# Patient Record
Sex: MALE | Race: WHITE | NOT HISPANIC OR LATINO | URBAN - METROPOLITAN AREA
[De-identification: names, ages, dates, MRNs, and addresses within clinical notes are randomized per-mention and may not be internally consistent; named-entity substitution may affect disease eponyms.]

---

## 2019-02-04 ENCOUNTER — ON CAMPUS - OUTPATIENT (AMBULATORY)
Dept: URBAN - METROPOLITAN AREA HOSPITAL 2 | Facility: HOSPITAL | Age: 74
End: 2019-02-04

## 2019-02-04 VITALS
HEART RATE: 100 BPM | DIASTOLIC BLOOD PRESSURE: 75 MMHG | OXYGEN SATURATION: 98 % | HEIGHT: 69 IN | SYSTOLIC BLOOD PRESSURE: 124 MMHG | DIASTOLIC BLOOD PRESSURE: 86 MMHG | OXYGEN SATURATION: 100 % | HEART RATE: 86 BPM | DIASTOLIC BLOOD PRESSURE: 94 MMHG | HEART RATE: 109 BPM | RESPIRATION RATE: 15 BRPM | OXYGEN SATURATION: 96 % | TEMPERATURE: 97.1 F | SYSTOLIC BLOOD PRESSURE: 101 MMHG | SYSTOLIC BLOOD PRESSURE: 188 MMHG | RESPIRATION RATE: 16 BRPM | DIASTOLIC BLOOD PRESSURE: 91 MMHG | DIASTOLIC BLOOD PRESSURE: 83 MMHG | DIASTOLIC BLOOD PRESSURE: 88 MMHG | HEART RATE: 107 BPM | RESPIRATION RATE: 18 BRPM | HEART RATE: 108 BPM | SYSTOLIC BLOOD PRESSURE: 169 MMHG | SYSTOLIC BLOOD PRESSURE: 144 MMHG | SYSTOLIC BLOOD PRESSURE: 115 MMHG | SYSTOLIC BLOOD PRESSURE: 143 MMHG | DIASTOLIC BLOOD PRESSURE: 60 MMHG | SYSTOLIC BLOOD PRESSURE: 134 MMHG | DIASTOLIC BLOOD PRESSURE: 73 MMHG | OXYGEN SATURATION: 99 % | HEART RATE: 105 BPM | SYSTOLIC BLOOD PRESSURE: 130 MMHG | WEIGHT: 265 LBS | SYSTOLIC BLOOD PRESSURE: 164 MMHG | OXYGEN SATURATION: 97 %

## 2019-02-04 DIAGNOSIS — Z12.11 ENCOUNTER FOR SCREENING FOR MALIGNANT NEOPLASM OF COLON: ICD-10-CM

## 2019-02-04 DIAGNOSIS — K64.1 SECOND DEGREE HEMORRHOIDS: ICD-10-CM

## 2019-02-04 PROCEDURE — G0121 COLON CA SCRN NOT HI RSK IND: HCPCS | Performed by: INTERNAL MEDICINE

## 2019-02-04 NOTE — SERVICEHPINOTES
RICH ALLEN  is a  73  male   who presents today for a  Colonoscopy   for   the indications listed below. The updated Patient Profile was reviewed prior to the procedure, in conjunction with the Physical Exam, including medical conditions, surgical procedures, medications, allergies, family history and social history. See Physical Exam time stamp below for date and time of HPI completion.Pre-operatively, I reviewed the indication(s) for the procedure, the risks of the procedure [including but not limited to: unexpected bleeding possibly requiring hospitalization and/or unplanned repeat procedures, perforation possibly requiring surgical treatment, missed lesions and complications of sedation/MAC (also explained by anesthesia staff)]. I have evaluated the patient for risks associated with the planned anesthesia and the procedure to be performed and find the patient an acceptable candidate for IV sedation.Multiple opportunities were provided for any questions or concerns, and all questions were answered satisfactorily before any anesthesia was administered. We will proceed with the planned procedure.BR

## 2021-05-27 ENCOUNTER — OFFICE (AMBULATORY)
Dept: URBAN - METROPOLITAN AREA CLINIC 64 | Facility: CLINIC | Age: 76
End: 2021-05-27
Payer: COMMERCIAL

## 2021-05-27 VITALS
HEART RATE: 89 BPM | HEIGHT: 69 IN | SYSTOLIC BLOOD PRESSURE: 130 MMHG | DIASTOLIC BLOOD PRESSURE: 74 MMHG | WEIGHT: 285 LBS

## 2021-05-27 DIAGNOSIS — R93.3 ABNORMAL FINDINGS ON DIAGNOSTIC IMAGING OF OTHER PARTS OF DI: ICD-10-CM

## 2021-05-27 DIAGNOSIS — R74.8 ABNORMAL LEVELS OF OTHER SERUM ENZYMES: ICD-10-CM

## 2021-05-27 DIAGNOSIS — R74.0 NONSPECIFIC ELEVATION OF LEVELS OF TRANSAMINASE AND LACTIC A: ICD-10-CM

## 2021-05-27 PROCEDURE — 99214 OFFICE O/P EST MOD 30 MIN: CPT | Performed by: NURSE PRACTITIONER

## 2021-07-22 ENCOUNTER — OFFICE (AMBULATORY)
Dept: URBAN - METROPOLITAN AREA CLINIC 64 | Facility: CLINIC | Age: 76
End: 2021-07-22
Payer: MEDICARE

## 2021-07-22 VITALS — HEIGHT: 69 IN

## 2021-07-22 DIAGNOSIS — R74.01 ELEVATION OF LEVELS OF LIVER TRANSAMINASE LEVELS: ICD-10-CM

## 2021-07-22 DIAGNOSIS — K59.00 CONSTIPATION, UNSPECIFIED: ICD-10-CM

## 2021-07-22 DIAGNOSIS — K74.69 OTHER CIRRHOSIS OF LIVER: ICD-10-CM

## 2021-07-22 DIAGNOSIS — E66.9 OBESITY, UNSPECIFIED: ICD-10-CM

## 2021-07-22 PROCEDURE — 99213 OFFICE O/P EST LOW 20 MIN: CPT | Mod: 95 | Performed by: NURSE PRACTITIONER

## 2021-09-30 ENCOUNTER — OFFICE (AMBULATORY)
Dept: URBAN - METROPOLITAN AREA CLINIC 64 | Facility: CLINIC | Age: 76
End: 2021-09-30
Payer: MEDICARE

## 2021-09-30 VITALS
HEART RATE: 94 BPM | DIASTOLIC BLOOD PRESSURE: 74 MMHG | HEIGHT: 69 IN | SYSTOLIC BLOOD PRESSURE: 132 MMHG | WEIGHT: 282 LBS

## 2021-09-30 DIAGNOSIS — K74.69 OTHER CIRRHOSIS OF LIVER: ICD-10-CM

## 2021-09-30 PROCEDURE — 99214 OFFICE O/P EST MOD 30 MIN: CPT | Performed by: NURSE PRACTITIONER

## 2022-02-22 ENCOUNTER — HOSPITAL ENCOUNTER (OUTPATIENT)
Facility: HOSPITAL | Age: 77
Setting detail: OBSERVATION
Discharge: HOME | End: 2022-02-24
Attending: EMERGENCY MEDICINE | Admitting: INTERNAL MEDICINE

## 2022-02-22 ENCOUNTER — APPOINTMENT (OUTPATIENT)
Dept: GENERAL RADIOLOGY | Facility: HOSPITAL | Age: 77
End: 2022-02-22

## 2022-02-22 ENCOUNTER — APPOINTMENT (OUTPATIENT)
Dept: CT IMAGING | Facility: HOSPITAL | Age: 77
End: 2022-02-22

## 2022-02-22 DIAGNOSIS — G45.9 TIA (TRANSIENT ISCHEMIC ATTACK): Primary | ICD-10-CM

## 2022-02-22 PROBLEM — F32.A MILD DEPRESSION: Chronic | Status: ACTIVE | Noted: 2022-02-22

## 2022-02-22 PROBLEM — G25.81 RESTLESS LEG SYNDROME: Chronic | Status: ACTIVE | Noted: 2022-02-22

## 2022-02-22 PROBLEM — I10 ESSENTIAL HYPERTENSION: Chronic | Status: ACTIVE | Noted: 2022-02-22

## 2022-02-22 PROBLEM — E11.42 DIABETIC PERIPHERAL NEUROPATHY: Chronic | Status: ACTIVE | Noted: 2022-02-22

## 2022-02-22 PROBLEM — D50.9 IRON DEFICIENCY ANEMIA: Status: ACTIVE | Noted: 2020-07-24

## 2022-02-22 PROBLEM — E11.65 TYPE 2 DIABETES MELLITUS WITH HYPERGLYCEMIA: Chronic | Status: ACTIVE | Noted: 2022-02-22

## 2022-02-22 PROBLEM — D64.9 NORMOCYTIC ANEMIA: Status: ACTIVE | Noted: 2019-04-29

## 2022-02-22 LAB
ALBUMIN SERPL-MCNC: 3.6 G/DL (ref 3.5–5.2)
ALBUMIN/GLOB SERPL: 0.9 G/DL
ALP SERPL-CCNC: 128 U/L (ref 39–117)
ALT SERPL W P-5'-P-CCNC: 27 U/L (ref 1–41)
ANION GAP SERPL CALCULATED.3IONS-SCNC: 11 MMOL/L (ref 5–15)
AST SERPL-CCNC: 63 U/L (ref 1–40)
BASOPHILS # BLD AUTO: 0.1 10*3/MM3 (ref 0–0.2)
BASOPHILS NFR BLD AUTO: 1 % (ref 0–1.5)
BILIRUB SERPL-MCNC: 0.3 MG/DL (ref 0–1.2)
BILIRUB UR QL STRIP: NEGATIVE
BUN SERPL-MCNC: 15 MG/DL (ref 8–23)
BUN/CREAT SERPL: 14.6 (ref 7–25)
CALCIUM SPEC-SCNC: 9.5 MG/DL (ref 8.6–10.5)
CHLORIDE SERPL-SCNC: 100 MMOL/L (ref 98–107)
CHOLEST SERPL-MCNC: 131 MG/DL (ref 0–200)
CLARITY UR: CLEAR
CO2 SERPL-SCNC: 28 MMOL/L (ref 22–29)
COLOR UR: YELLOW
CREAT SERPL-MCNC: 1.03 MG/DL (ref 0.76–1.27)
DEPRECATED RDW RBC AUTO: 54.3 FL (ref 37–54)
EOSINOPHIL # BLD AUTO: 0.2 10*3/MM3 (ref 0–0.4)
EOSINOPHIL NFR BLD AUTO: 3.8 % (ref 0.3–6.2)
ERYTHROCYTE [DISTWIDTH] IN BLOOD BY AUTOMATED COUNT: 15.9 % (ref 12.3–15.4)
GFR SERPL CREATININE-BSD FRML MDRD: 70 ML/MIN/1.73
GLOBULIN UR ELPH-MCNC: 4 GM/DL
GLUCOSE BLDC GLUCOMTR-MCNC: 377 MG/DL (ref 70–105)
GLUCOSE SERPL-MCNC: 250 MG/DL (ref 65–99)
GLUCOSE UR STRIP-MCNC: ABNORMAL MG/DL
HCT VFR BLD AUTO: 39.7 % (ref 37.5–51)
HDLC SERPL-MCNC: 49 MG/DL (ref 40–60)
HGB BLD-MCNC: 13.2 G/DL (ref 13–17.7)
HGB UR QL STRIP.AUTO: NEGATIVE
KETONES UR QL STRIP: NEGATIVE
LDLC SERPL CALC-MCNC: 56 MG/DL (ref 0–100)
LDLC/HDLC SERPL: 1.05 {RATIO}
LEUKOCYTE ESTERASE UR QL STRIP.AUTO: NEGATIVE
LYMPHOCYTES # BLD AUTO: 1.3 10*3/MM3 (ref 0.7–3.1)
LYMPHOCYTES NFR BLD AUTO: 23.4 % (ref 19.6–45.3)
MCH RBC QN AUTO: 32.1 PG (ref 26.6–33)
MCHC RBC AUTO-ENTMCNC: 33.2 G/DL (ref 31.5–35.7)
MCV RBC AUTO: 96.8 FL (ref 79–97)
MONOCYTES # BLD AUTO: 0.6 10*3/MM3 (ref 0.1–0.9)
MONOCYTES NFR BLD AUTO: 11.2 % (ref 5–12)
NEUTROPHILS NFR BLD AUTO: 3.3 10*3/MM3 (ref 1.7–7)
NEUTROPHILS NFR BLD AUTO: 60.6 % (ref 42.7–76)
NITRITE UR QL STRIP: NEGATIVE
NRBC BLD AUTO-RTO: 0.1 /100 WBC (ref 0–0.2)
PH UR STRIP.AUTO: <=5 [PH] (ref 5–8)
PLATELET # BLD AUTO: 140 10*3/MM3 (ref 140–450)
PMV BLD AUTO: 8.9 FL (ref 6–12)
POTASSIUM SERPL-SCNC: 4.1 MMOL/L (ref 3.5–5.2)
PROT SERPL-MCNC: 7.6 G/DL (ref 6–8.5)
PROT UR QL STRIP: NEGATIVE
RBC # BLD AUTO: 4.1 10*6/MM3 (ref 4.14–5.8)
SODIUM SERPL-SCNC: 139 MMOL/L (ref 136–145)
SP GR UR STRIP: 1.03 (ref 1–1.03)
TRIGL SERPL-MCNC: 153 MG/DL (ref 0–150)
TROPONIN T SERPL-MCNC: <0.01 NG/ML (ref 0–0.03)
TSH SERPL DL<=0.05 MIU/L-ACNC: 3.99 UIU/ML (ref 0.27–4.2)
UROBILINOGEN UR QL STRIP: ABNORMAL
VLDLC SERPL-MCNC: 26 MG/DL (ref 5–40)
WBC NRBC COR # BLD: 5.4 10*3/MM3 (ref 3.4–10.8)

## 2022-02-22 PROCEDURE — 80053 COMPREHEN METABOLIC PANEL: CPT | Performed by: EMERGENCY MEDICINE

## 2022-02-22 PROCEDURE — 81003 URINALYSIS AUTO W/O SCOPE: CPT | Performed by: EMERGENCY MEDICINE

## 2022-02-22 PROCEDURE — 82962 GLUCOSE BLOOD TEST: CPT

## 2022-02-22 PROCEDURE — 84484 ASSAY OF TROPONIN QUANT: CPT | Performed by: EMERGENCY MEDICINE

## 2022-02-22 PROCEDURE — G0378 HOSPITAL OBSERVATION PER HR: HCPCS

## 2022-02-22 PROCEDURE — 99285 EMERGENCY DEPT VISIT HI MDM: CPT

## 2022-02-22 PROCEDURE — 71045 X-RAY EXAM CHEST 1 VIEW: CPT

## 2022-02-22 PROCEDURE — 83036 HEMOGLOBIN GLYCOSYLATED A1C: CPT | Performed by: NURSE PRACTITIONER

## 2022-02-22 PROCEDURE — 93005 ELECTROCARDIOGRAM TRACING: CPT | Performed by: EMERGENCY MEDICINE

## 2022-02-22 PROCEDURE — 70450 CT HEAD/BRAIN W/O DYE: CPT

## 2022-02-22 PROCEDURE — 84443 ASSAY THYROID STIM HORMONE: CPT | Performed by: NURSE PRACTITIONER

## 2022-02-22 PROCEDURE — 85025 COMPLETE CBC W/AUTO DIFF WBC: CPT | Performed by: EMERGENCY MEDICINE

## 2022-02-22 PROCEDURE — 99219 PR INITIAL OBSERVATION CARE/DAY 50 MINUTES: CPT | Performed by: NURSE PRACTITIONER

## 2022-02-22 PROCEDURE — 80061 LIPID PANEL: CPT | Performed by: NURSE PRACTITIONER

## 2022-02-22 RX ORDER — ASPIRIN 325 MG
325 TABLET ORAL DAILY
Status: DISCONTINUED | OUTPATIENT
Start: 2022-02-23 | End: 2022-02-23

## 2022-02-22 RX ORDER — ASPIRIN 300 MG/1
300 SUPPOSITORY RECTAL DAILY
Status: DISCONTINUED | OUTPATIENT
Start: 2022-02-23 | End: 2022-02-23

## 2022-02-22 RX ORDER — DEXTROSE MONOHYDRATE 25 G/50ML
25 INJECTION, SOLUTION INTRAVENOUS
Status: DISCONTINUED | OUTPATIENT
Start: 2022-02-22 | End: 2022-02-24 | Stop reason: HOSPADM

## 2022-02-22 RX ORDER — OLANZAPINE 10 MG/2ML
1 INJECTION, POWDER, LYOPHILIZED, FOR SOLUTION INTRAMUSCULAR AS NEEDED
Status: DISCONTINUED | OUTPATIENT
Start: 2022-02-22 | End: 2022-02-24 | Stop reason: HOSPADM

## 2022-02-22 RX ORDER — ASPIRIN 325 MG
325 TABLET ORAL ONCE
Status: DISCONTINUED | OUTPATIENT
Start: 2022-02-22 | End: 2022-02-23

## 2022-02-22 RX ORDER — SODIUM CHLORIDE 0.9 % (FLUSH) 0.9 %
10 SYRINGE (ML) INJECTION AS NEEDED
Status: DISCONTINUED | OUTPATIENT
Start: 2022-02-22 | End: 2022-02-24 | Stop reason: HOSPADM

## 2022-02-22 RX ORDER — INSULIN LISPRO 100 [IU]/ML
0-9 INJECTION, SOLUTION INTRAVENOUS; SUBCUTANEOUS AS NEEDED
Status: DISCONTINUED | OUTPATIENT
Start: 2022-02-22 | End: 2022-02-24 | Stop reason: HOSPADM

## 2022-02-22 RX ORDER — INSULIN LISPRO 100 [IU]/ML
0-9 INJECTION, SOLUTION INTRAVENOUS; SUBCUTANEOUS
Status: DISCONTINUED | OUTPATIENT
Start: 2022-02-23 | End: 2022-02-24 | Stop reason: HOSPADM

## 2022-02-22 RX ORDER — ATORVASTATIN CALCIUM 40 MG/1
80 TABLET, FILM COATED ORAL NIGHTLY
Status: DISCONTINUED | OUTPATIENT
Start: 2022-02-22 | End: 2022-02-23

## 2022-02-22 RX ORDER — NICOTINE POLACRILEX 4 MG
15 LOZENGE BUCCAL
Status: DISCONTINUED | OUTPATIENT
Start: 2022-02-22 | End: 2022-02-24 | Stop reason: HOSPADM

## 2022-02-23 ENCOUNTER — APPOINTMENT (OUTPATIENT)
Dept: MRI IMAGING | Facility: HOSPITAL | Age: 77
End: 2022-02-23

## 2022-02-23 ENCOUNTER — APPOINTMENT (OUTPATIENT)
Dept: CARDIOLOGY | Facility: HOSPITAL | Age: 77
End: 2022-02-23

## 2022-02-23 PROBLEM — I73.9 PERIPHERAL VASCULAR DISEASE: Chronic | Status: ACTIVE | Noted: 2022-02-23

## 2022-02-23 LAB
ALBUMIN SERPL-MCNC: 3.5 G/DL (ref 3.5–5.2)
ALBUMIN/GLOB SERPL: 1 G/DL
ALP SERPL-CCNC: 107 U/L (ref 39–117)
ALT SERPL W P-5'-P-CCNC: 27 U/L (ref 1–41)
ANION GAP SERPL CALCULATED.3IONS-SCNC: 11 MMOL/L (ref 5–15)
AST SERPL-CCNC: 61 U/L (ref 1–40)
BILIRUB SERPL-MCNC: 0.4 MG/DL (ref 0–1.2)
BUN SERPL-MCNC: 14 MG/DL (ref 8–23)
BUN/CREAT SERPL: 15.4 (ref 7–25)
CALCIUM SPEC-SCNC: 9.2 MG/DL (ref 8.6–10.5)
CHLORIDE SERPL-SCNC: 100 MMOL/L (ref 98–107)
CO2 SERPL-SCNC: 28 MMOL/L (ref 22–29)
CREAT SERPL-MCNC: 0.91 MG/DL (ref 0.76–1.27)
DEPRECATED RDW RBC AUTO: 52.9 FL (ref 37–54)
ERYTHROCYTE [DISTWIDTH] IN BLOOD BY AUTOMATED COUNT: 15.8 % (ref 12.3–15.4)
GFR SERPL CREATININE-BSD FRML MDRD: 81 ML/MIN/1.73
GLOBULIN UR ELPH-MCNC: 3.6 GM/DL
GLUCOSE BLDC GLUCOMTR-MCNC: 156 MG/DL (ref 70–105)
GLUCOSE BLDC GLUCOMTR-MCNC: 229 MG/DL (ref 70–105)
GLUCOSE BLDC GLUCOMTR-MCNC: 257 MG/DL (ref 70–105)
GLUCOSE BLDC GLUCOMTR-MCNC: 284 MG/DL (ref 70–105)
GLUCOSE SERPL-MCNC: 139 MG/DL (ref 65–99)
HBA1C MFR BLD: 9.7 % (ref 3.5–5.6)
HCT VFR BLD AUTO: 37.1 % (ref 37.5–51)
HGB BLD-MCNC: 12.7 G/DL (ref 13–17.7)
MCH RBC QN AUTO: 32.9 PG (ref 26.6–33)
MCHC RBC AUTO-ENTMCNC: 34.1 G/DL (ref 31.5–35.7)
MCV RBC AUTO: 96.5 FL (ref 79–97)
PLATELET # BLD AUTO: 129 10*3/MM3 (ref 140–450)
PMV BLD AUTO: 8.8 FL (ref 6–12)
POTASSIUM SERPL-SCNC: 3.8 MMOL/L (ref 3.5–5.2)
PROT SERPL-MCNC: 7.1 G/DL (ref 6–8.5)
RBC # BLD AUTO: 3.85 10*6/MM3 (ref 4.14–5.8)
SODIUM SERPL-SCNC: 139 MMOL/L (ref 136–145)
VIT B12 BLD-MCNC: 366 PG/ML (ref 211–946)
WBC NRBC COR # BLD: 6 10*3/MM3 (ref 3.4–10.8)

## 2022-02-23 PROCEDURE — 80053 COMPREHEN METABOLIC PANEL: CPT | Performed by: NURSE PRACTITIONER

## 2022-02-23 PROCEDURE — 99214 OFFICE O/P EST MOD 30 MIN: CPT | Performed by: NURSE PRACTITIONER

## 2022-02-23 PROCEDURE — 97162 PT EVAL MOD COMPLEX 30 MIN: CPT

## 2022-02-23 PROCEDURE — G0378 HOSPITAL OBSERVATION PER HR: HCPCS

## 2022-02-23 PROCEDURE — 92610 EVALUATE SWALLOWING FUNCTION: CPT

## 2022-02-23 PROCEDURE — 96372 THER/PROPH/DIAG INJ SC/IM: CPT

## 2022-02-23 PROCEDURE — 85027 COMPLETE CBC AUTOMATED: CPT | Performed by: NURSE PRACTITIONER

## 2022-02-23 PROCEDURE — 82607 VITAMIN B-12: CPT | Performed by: NURSE PRACTITIONER

## 2022-02-23 PROCEDURE — 70544 MR ANGIOGRAPHY HEAD W/O DYE: CPT

## 2022-02-23 PROCEDURE — 25010000002 ENOXAPARIN PER 10 MG: Performed by: INTERNAL MEDICINE

## 2022-02-23 PROCEDURE — 63710000001 INSULIN LISPRO (HUMAN) PER 5 UNITS: Performed by: NURSE PRACTITIONER

## 2022-02-23 PROCEDURE — 25010000002 ENOXAPARIN PER 10 MG: Performed by: NURSE PRACTITIONER

## 2022-02-23 PROCEDURE — 99226 PR SBSQ OBSERVATION CARE/DAY 35 MINUTES: CPT | Performed by: INTERNAL MEDICINE

## 2022-02-23 PROCEDURE — 82962 GLUCOSE BLOOD TEST: CPT

## 2022-02-23 PROCEDURE — 63710000001 INSULIN LISPRO (HUMAN) PER 5 UNITS: Performed by: INTERNAL MEDICINE

## 2022-02-23 PROCEDURE — 70551 MRI BRAIN STEM W/O DYE: CPT

## 2022-02-23 PROCEDURE — 36415 COLL VENOUS BLD VENIPUNCTURE: CPT | Performed by: NURSE PRACTITIONER

## 2022-02-23 RX ORDER — DULOXETIN HYDROCHLORIDE 30 MG/1
60 CAPSULE, DELAYED RELEASE ORAL DAILY
Status: DISCONTINUED | OUTPATIENT
Start: 2022-02-24 | End: 2022-02-24 | Stop reason: HOSPADM

## 2022-02-23 RX ORDER — TRAZODONE HYDROCHLORIDE 50 MG/1
50 TABLET ORAL DAILY
Status: ON HOLD | COMMUNITY
End: 2022-02-23

## 2022-02-23 RX ORDER — ONDANSETRON 4 MG/1
TABLET, FILM COATED ORAL
COMMUNITY

## 2022-02-23 RX ORDER — CLOBETASOL PROPIONATE 0.5 MG/G
CREAM TOPICAL
Status: ON HOLD | COMMUNITY
End: 2022-02-23

## 2022-02-23 RX ORDER — METOPROLOL SUCCINATE 25 MG/1
25 TABLET, EXTENDED RELEASE ORAL DAILY
COMMUNITY

## 2022-02-23 RX ORDER — DULOXETIN HYDROCHLORIDE 60 MG/1
60 CAPSULE, DELAYED RELEASE ORAL DAILY
COMMUNITY

## 2022-02-23 RX ORDER — GLIMEPIRIDE 4 MG/1
4 TABLET ORAL DAILY
COMMUNITY

## 2022-02-23 RX ORDER — TRAZODONE HYDROCHLORIDE 50 MG/1
50 TABLET ORAL DAILY
Status: DISCONTINUED | OUTPATIENT
Start: 2022-02-24 | End: 2022-02-24

## 2022-02-23 RX ORDER — ATORVASTATIN CALCIUM 40 MG/1
40 TABLET, FILM COATED ORAL NIGHTLY
Status: DISCONTINUED | OUTPATIENT
Start: 2022-02-23 | End: 2022-02-24 | Stop reason: HOSPADM

## 2022-02-23 RX ORDER — ASPIRIN 81 MG/1
81 TABLET, CHEWABLE ORAL DAILY
Status: DISCONTINUED | OUTPATIENT
Start: 2022-02-24 | End: 2022-02-24 | Stop reason: HOSPADM

## 2022-02-23 RX ORDER — PIOGLITAZONEHYDROCHLORIDE 15 MG/1
15 TABLET ORAL DAILY
COMMUNITY

## 2022-02-23 RX ORDER — GABAPENTIN 600 MG/1
600 TABLET ORAL 3 TIMES DAILY
COMMUNITY

## 2022-02-23 RX ORDER — GABAPENTIN 400 MG/1
400 CAPSULE ORAL 3 TIMES DAILY
Status: ON HOLD | COMMUNITY
End: 2022-02-23

## 2022-02-23 RX ORDER — ROPINIROLE 1 MG/1
4 TABLET, FILM COATED ORAL NIGHTLY
Status: DISCONTINUED | OUTPATIENT
Start: 2022-02-24 | End: 2022-02-24 | Stop reason: HOSPADM

## 2022-02-23 RX ORDER — ROPINIROLE 4 MG/1
4 TABLET, FILM COATED ORAL DAILY
COMMUNITY

## 2022-02-23 RX ORDER — DULOXETIN HYDROCHLORIDE 30 MG/1
60 CAPSULE, DELAYED RELEASE ORAL DAILY
Status: ON HOLD | COMMUNITY
End: 2022-02-23

## 2022-02-23 RX ORDER — GABAPENTIN 600 MG/1
600 TABLET ORAL 3 TIMES DAILY
Status: DISCONTINUED | OUTPATIENT
Start: 2022-02-24 | End: 2022-02-24 | Stop reason: HOSPADM

## 2022-02-23 RX ORDER — BLOOD SUGAR DIAGNOSTIC
1 STRIP MISCELLANEOUS DAILY
Qty: 100 EACH | Refills: 2 | Status: CANCELLED | OUTPATIENT
Start: 2022-02-23

## 2022-02-23 RX ORDER — GABAPENTIN 400 MG/1
400 CAPSULE ORAL 3 TIMES DAILY
Status: DISCONTINUED | OUTPATIENT
Start: 2022-02-23 | End: 2022-02-23

## 2022-02-23 RX ORDER — METOPROLOL SUCCINATE 25 MG/1
25 TABLET, EXTENDED RELEASE ORAL DAILY
Status: DISCONTINUED | OUTPATIENT
Start: 2022-02-24 | End: 2022-02-24 | Stop reason: HOSPADM

## 2022-02-23 RX ORDER — DULOXETIN HYDROCHLORIDE 30 MG/1
30 CAPSULE, DELAYED RELEASE ORAL DAILY
Status: DISCONTINUED | OUTPATIENT
Start: 2022-02-24 | End: 2022-02-23

## 2022-02-23 RX ADMIN — ASPIRIN 300 MG: 300 SUPPOSITORY RECTAL at 10:05

## 2022-02-23 RX ADMIN — INSULIN LISPRO 2 UNITS: 100 INJECTION, SOLUTION INTRAVENOUS; SUBCUTANEOUS at 18:49

## 2022-02-23 RX ADMIN — ATORVASTATIN CALCIUM 40 MG: 40 TABLET, FILM COATED ORAL at 21:06

## 2022-02-23 RX ADMIN — ENOXAPARIN SODIUM 40 MG: 40 INJECTION SUBCUTANEOUS at 00:44

## 2022-02-23 RX ADMIN — INSULIN LISPRO 2 UNITS: 100 INJECTION, SOLUTION INTRAVENOUS; SUBCUTANEOUS at 10:05

## 2022-02-23 RX ADMIN — GABAPENTIN 400 MG: 400 CAPSULE ORAL at 21:46

## 2022-02-23 RX ADMIN — INSULIN LISPRO 6 UNITS: 100 INJECTION, SOLUTION INTRAVENOUS; SUBCUTANEOUS at 13:50

## 2022-02-23 RX ADMIN — ENOXAPARIN SODIUM 40 MG: 40 INJECTION SUBCUTANEOUS at 18:49

## 2022-02-23 RX ADMIN — Medication 10 ML: at 10:05

## 2022-02-24 ENCOUNTER — APPOINTMENT (OUTPATIENT)
Dept: CARDIOLOGY | Facility: HOSPITAL | Age: 77
End: 2022-02-24

## 2022-02-24 VITALS
HEIGHT: 69 IN | HEART RATE: 96 BPM | TEMPERATURE: 98.5 F | DIASTOLIC BLOOD PRESSURE: 56 MMHG | OXYGEN SATURATION: 92 % | SYSTOLIC BLOOD PRESSURE: 122 MMHG | BODY MASS INDEX: 42.36 KG/M2 | RESPIRATION RATE: 16 BRPM | WEIGHT: 286 LBS

## 2022-02-24 PROBLEM — R41.0 TRANSIENT CONFUSION: Status: RESOLVED | Noted: 2022-02-22 | Resolved: 2022-02-24

## 2022-02-24 PROBLEM — R41.0 TRANSIENT CONFUSION: Status: ACTIVE | Noted: 2022-02-22

## 2022-02-24 LAB
BH CV ECHO MEAS - AO MAX PG: 18.8 MMHG
BH CV ECHO MEAS - AO MEAN PG: 9.3 MMHG
BH CV ECHO MEAS - AO ROOT DIAM: 3 CM
BH CV ECHO MEAS - AO V2 MAX: 217 CM/SEC
BH CV ECHO MEAS - AO V2 VTI: 37.4 CM
BH CV ECHO MEAS - AVA(I,D): 2.18 CM2
BH CV ECHO MEAS - EDV(CUBED): 75.7 ML
BH CV ECHO MEAS - EDV(MOD-SP4): 85.5 ML
BH CV ECHO MEAS - EF(MOD-BP): 60 %
BH CV ECHO MEAS - EF(MOD-SP4): 73.9 %
BH CV ECHO MEAS - ESV(CUBED): 20.4 ML
BH CV ECHO MEAS - ESV(MOD-SP4): 22.3 ML
BH CV ECHO MEAS - FS: 35.4 %
BH CV ECHO MEAS - IVS/LVPW: 1.28 CM
BH CV ECHO MEAS - IVSD: 1.61 CM
BH CV ECHO MEAS - LV DIASTOLIC VOL/BSA (35-75): 35.6 CM2
BH CV ECHO MEAS - LV MASS(C)D: 234.6 GRAMS
BH CV ECHO MEAS - LV MAX PG: 4 MMHG
BH CV ECHO MEAS - LV MEAN PG: 2.48 MMHG
BH CV ECHO MEAS - LV SYSTOLIC VOL/BSA (12-30): 9.3 CM2
BH CV ECHO MEAS - LV V1 MAX: 100 CM/SEC
BH CV ECHO MEAS - LV V1 VTI: 22.1 CM
BH CV ECHO MEAS - LVIDD: 4.2 CM
BH CV ECHO MEAS - LVIDS: 2.7 CM
BH CV ECHO MEAS - LVOT AREA: 3.7 CM2
BH CV ECHO MEAS - LVOT DIAM: 2.17 CM
BH CV ECHO MEAS - LVPWD: 1.25 CM
BH CV ECHO MEAS - MV A MAX VEL: 128.3 CM/SEC
BH CV ECHO MEAS - MV DEC SLOPE: 449.3 CM/SEC2
BH CV ECHO MEAS - MV DEC TIME: 0.22 MSEC
BH CV ECHO MEAS - MV E MAX VEL: 98.2 CM/SEC
BH CV ECHO MEAS - MV E/A: 0.77
BH CV ECHO MEAS - MV MAX PG: 9.6 MMHG
BH CV ECHO MEAS - MV MEAN PG: 4.9 MMHG
BH CV ECHO MEAS - MV V2 VTI: 25.4 CM
BH CV ECHO MEAS - MVA(VTI): 3.2 CM2
BH CV ECHO MEAS - PA ACC TIME: 0.02 SEC
BH CV ECHO MEAS - PA PR(ACCEL): 68.7 MMHG
BH CV ECHO MEAS - PA V2 MAX: 122 CM/SEC
BH CV ECHO MEAS - RV MAX PG: 1.64 MMHG
BH CV ECHO MEAS - RV V1 MAX: 64.1 CM/SEC
BH CV ECHO MEAS - RV V1 VTI: 12.6 CM
BH CV ECHO MEAS - RVDD: 2.6 CM
BH CV ECHO MEAS - SI(MOD-SP4): 26.3 ML/M2
BH CV ECHO MEAS - SV(LVOT): 81.6 ML
BH CV ECHO MEAS - SV(MOD-SP4): 63.2 ML
BH CV XLRA MEAS LEFT DIST CCA EDV: -11 CM/SEC
BH CV XLRA MEAS LEFT DIST CCA PSV: -69.7 CM/SEC
BH CV XLRA MEAS LEFT DIST ICA EDV: 17.5 CM/SEC
BH CV XLRA MEAS LEFT DIST ICA PSV: 84.1 CM/SEC
BH CV XLRA MEAS LEFT ICA/CCA RATIO: -1.21
BH CV XLRA MEAS LEFT PROX CCA EDV: -9.9 CM/SEC
BH CV XLRA MEAS LEFT PROX CCA PSV: -68.6 CM/SEC
BH CV XLRA MEAS LEFT PROX ECA PSV: -75.8 CM/SEC
BH CV XLRA MEAS LEFT PROX ICA EDV: -16.1 CM/SEC
BH CV XLRA MEAS LEFT PROX ICA PSV: -82.7 CM/SEC
BH CV XLRA MEAS LEFT PROX SCLA PSV: 90.4 CM/SEC
BH CV XLRA MEAS LEFT VERTEBRAL A PSV: 44.3 CM/SEC
BH CV XLRA MEAS RIGHT DIST CCA EDV: -7.9 CM/SEC
BH CV XLRA MEAS RIGHT DIST CCA PSV: -61.9 CM/SEC
BH CV XLRA MEAS RIGHT ICA/CCA RATIO: 0.57
BH CV XLRA MEAS RIGHT PROX CCA EDV: -9.3 CM/SEC
BH CV XLRA MEAS RIGHT PROX CCA PSV: -75.2 CM/SEC
BH CV XLRA MEAS RIGHT PROX ECA PSV: -95.1 CM/SEC
BH CV XLRA MEAS RIGHT PROX ICA EDV: -6.7 CM/SEC
BH CV XLRA MEAS RIGHT PROX ICA PSV: -43.1 CM/SEC
BH CV XLRA MEAS RIGHT PROX SCLA PSV: 109 CM/SEC
GLUCOSE BLDC GLUCOMTR-MCNC: 217 MG/DL (ref 70–105)
GLUCOSE BLDC GLUCOMTR-MCNC: 219 MG/DL (ref 70–105)
GLUCOSE BLDC GLUCOMTR-MCNC: 266 MG/DL (ref 70–105)
GLUCOSE BLDC GLUCOMTR-MCNC: 368 MG/DL (ref 70–105)
MAXIMAL PREDICTED HEART RATE: 144 BPM
MAXIMAL PREDICTED HEART RATE: 144 BPM
STRESS TARGET HR: 122 BPM
STRESS TARGET HR: 122 BPM

## 2022-02-24 PROCEDURE — G0378 HOSPITAL OBSERVATION PER HR: HCPCS

## 2022-02-24 PROCEDURE — 63710000001 INSULIN LISPRO (HUMAN) PER 5 UNITS: Performed by: INTERNAL MEDICINE

## 2022-02-24 PROCEDURE — 96375 TX/PRO/DX INJ NEW DRUG ADDON: CPT

## 2022-02-24 PROCEDURE — 82962 GLUCOSE BLOOD TEST: CPT

## 2022-02-24 PROCEDURE — 25010000002 SULFUR HEXAFLUORIDE MICROSPH 60.7-25 MG RECONSTITUTED SUSPENSION: Performed by: INTERNAL MEDICINE

## 2022-02-24 PROCEDURE — 93880 EXTRACRANIAL BILAT STUDY: CPT

## 2022-02-24 PROCEDURE — 63710000001 INSULIN GLARGINE PER 5 UNITS: Performed by: INTERNAL MEDICINE

## 2022-02-24 PROCEDURE — 25010000002 ENOXAPARIN PER 10 MG: Performed by: INTERNAL MEDICINE

## 2022-02-24 PROCEDURE — G0108 DIAB MANAGE TRN  PER INDIV: HCPCS

## 2022-02-24 PROCEDURE — 93306 TTE W/DOPPLER COMPLETE: CPT | Performed by: INTERNAL MEDICINE

## 2022-02-24 PROCEDURE — 99217 PR OBSERVATION CARE DISCHARGE MANAGEMENT: CPT | Performed by: INTERNAL MEDICINE

## 2022-02-24 PROCEDURE — 96372 THER/PROPH/DIAG INJ SC/IM: CPT

## 2022-02-24 PROCEDURE — 93306 TTE W/DOPPLER COMPLETE: CPT

## 2022-02-24 PROCEDURE — 99213 OFFICE O/P EST LOW 20 MIN: CPT | Performed by: NURSE PRACTITIONER

## 2022-02-24 RX ORDER — ATORVASTATIN CALCIUM 20 MG/1
20 TABLET, FILM COATED ORAL NIGHTLY
Qty: 30 TABLET | Refills: 0 | Status: SHIPPED | OUTPATIENT
Start: 2022-02-24 | End: 2022-03-26

## 2022-02-24 RX ORDER — LABETALOL HYDROCHLORIDE 5 MG/ML
20 INJECTION, SOLUTION INTRAVENOUS EVERY 6 HOURS PRN
Status: DISCONTINUED | OUTPATIENT
Start: 2022-02-24 | End: 2022-02-24 | Stop reason: HOSPADM

## 2022-02-24 RX ORDER — BLOOD SUGAR DIAGNOSTIC
1 STRIP MISCELLANEOUS
Qty: 100 EACH | Refills: 2 | Status: SHIPPED | OUTPATIENT
Start: 2022-02-24

## 2022-02-24 RX ORDER — LANCETS
1 EACH MISCELLANEOUS DAILY
Qty: 100 EACH | Refills: 2 | Status: SHIPPED | OUTPATIENT
Start: 2022-02-24

## 2022-02-24 RX ORDER — ASPIRIN 81 MG/1
81 TABLET, CHEWABLE ORAL DAILY
Qty: 30 TABLET | Refills: 0 | Status: SHIPPED | OUTPATIENT
Start: 2022-02-25 | End: 2022-03-27

## 2022-02-24 RX ORDER — LANCETS
EACH MISCELLANEOUS
Qty: 100 EACH | Refills: 12 | Status: CANCELLED | OUTPATIENT
Start: 2022-02-24

## 2022-02-24 RX ORDER — ISOPROPYL ALCOHOL 700 MG/ML
1 CLOTH TOPICAL DAILY
Qty: 100 EACH | Refills: 2 | Status: SHIPPED | OUTPATIENT
Start: 2022-02-24

## 2022-02-24 RX ORDER — PEN NEEDLE, DIABETIC 30 GX3/16"
1 NEEDLE, DISPOSABLE MISCELLANEOUS DAILY
Qty: 100 EACH | Refills: 2 | Status: SHIPPED | OUTPATIENT
Start: 2022-02-24

## 2022-02-24 RX ORDER — LANOLIN ALCOHOL/MO/W.PET/CERES
1000 CREAM (GRAM) TOPICAL DAILY
Status: DISCONTINUED | OUTPATIENT
Start: 2022-02-24 | End: 2022-02-24 | Stop reason: HOSPADM

## 2022-02-24 RX ADMIN — LABETALOL 20 MG/4 ML (5 MG/ML) INTRAVENOUS SYRINGE 20 MG: at 03:44

## 2022-02-24 RX ADMIN — DULOXETINE HYDROCHLORIDE 60 MG: 30 CAPSULE, DELAYED RELEASE ORAL at 09:08

## 2022-02-24 RX ADMIN — GABAPENTIN 600 MG: 600 TABLET, FILM COATED ORAL at 09:08

## 2022-02-24 RX ADMIN — INSULIN LISPRO 4 UNITS: 100 INJECTION, SOLUTION INTRAVENOUS; SUBCUTANEOUS at 09:08

## 2022-02-24 RX ADMIN — INSULIN GLARGINE 10 UNITS: 100 INJECTION, SOLUTION SUBCUTANEOUS at 20:04

## 2022-02-24 RX ADMIN — SULFUR HEXAFLUORIDE 5 ML: KIT at 17:28

## 2022-02-24 RX ADMIN — GABAPENTIN 600 MG: 600 TABLET, FILM COATED ORAL at 17:44

## 2022-02-24 RX ADMIN — ROPINIROLE HYDROCHLORIDE 4 MG: 1 TABLET, FILM COATED ORAL at 20:03

## 2022-02-24 RX ADMIN — INSULIN LISPRO 8 UNITS: 100 INJECTION, SOLUTION INTRAVENOUS; SUBCUTANEOUS at 17:43

## 2022-02-24 RX ADMIN — CYANOCOBALAMIN TAB 1000 MCG 1000 MCG: 1000 TAB at 17:44

## 2022-02-24 RX ADMIN — ENOXAPARIN SODIUM 40 MG: 40 INJECTION SUBCUTANEOUS at 12:30

## 2022-02-24 RX ADMIN — ATORVASTATIN CALCIUM 40 MG: 40 TABLET, FILM COATED ORAL at 20:04

## 2022-02-24 RX ADMIN — ASPIRIN 81 MG CHEWABLE TABLET 81 MG: 81 TABLET CHEWABLE at 09:08

## 2022-02-24 RX ADMIN — GABAPENTIN 600 MG: 600 TABLET, FILM COATED ORAL at 20:03

## 2022-02-24 RX ADMIN — INSULIN LISPRO 4 UNITS: 100 INJECTION, SOLUTION INTRAVENOUS; SUBCUTANEOUS at 12:12

## 2022-02-24 RX ADMIN — METOPROLOL SUCCINATE 25 MG: 25 TABLET, EXTENDED RELEASE ORAL at 09:08

## 2022-02-25 ENCOUNTER — READMISSION MANAGEMENT (OUTPATIENT)
Dept: CALL CENTER | Facility: HOSPITAL | Age: 77
End: 2022-02-25

## 2022-02-26 ENCOUNTER — HOSPITAL ENCOUNTER (EMERGENCY)
Facility: HOSPITAL | Age: 77
Discharge: HOME | End: 2022-02-27
Attending: EMERGENCY MEDICINE | Admitting: EMERGENCY MEDICINE

## 2022-02-26 VITALS
BODY MASS INDEX: 41.27 KG/M2 | OXYGEN SATURATION: 96 % | HEART RATE: 88 BPM | TEMPERATURE: 97.4 F | HEIGHT: 69 IN | SYSTOLIC BLOOD PRESSURE: 144 MMHG | WEIGHT: 278.66 LBS | DIASTOLIC BLOOD PRESSURE: 71 MMHG | RESPIRATION RATE: 16 BRPM

## 2022-02-26 DIAGNOSIS — E11.65 TYPE 2 DIABETES MELLITUS WITH HYPERGLYCEMIA, WITHOUT LONG-TERM CURRENT USE OF INSULIN: Primary | ICD-10-CM

## 2022-02-26 LAB
GLUCOSE BLDC GLUCOMTR-MCNC: 193 MG/DL (ref 70–105)
QT INTERVAL: 351 MS

## 2022-02-26 PROCEDURE — 82962 GLUCOSE BLOOD TEST: CPT

## 2022-02-26 PROCEDURE — 99283 EMERGENCY DEPT VISIT LOW MDM: CPT

## 2022-02-27 PROCEDURE — 63710000001 INSULIN GLARGINE PER 5 UNITS: Performed by: EMERGENCY MEDICINE

## 2022-02-27 RX ADMIN — INSULIN GLARGINE 10 UNITS: 100 INJECTION, SOLUTION SUBCUTANEOUS at 00:33

## 2022-03-01 ENCOUNTER — READMISSION MANAGEMENT (OUTPATIENT)
Dept: CALL CENTER | Facility: HOSPITAL | Age: 77
End: 2022-03-01

## 2022-03-01 NOTE — OUTREACH NOTE
Stroke Week 1 Survey      Responses   [facility] patient discharged from? [facility]   Does the patient have one of the following disease processes/diagnoses(primary or secondary)? Stroke (TIA)   Week 1 attempt successful? Yes   Call start time 1155   Call end time 1200   Discharge diagnosis TIA   Meds reviewed with patient/caregiver? Yes   Is the patient having any side effects they believe may be caused by any medication additions or changes? No   Does the patient have all medications ordered at discharge? Yes   Is the patient taking all medications as directed (includes completed medication regime)? No   What is preventing the patient from taking all medications as directed? Other   Nursing Interventions Nurse provided patient education   Medication comments He states wife needs one more med to    Does the patient have a primary care provider?  Yes   Does the patient have an appointment with their PCP within 7 days of discharge? Yes   Has the patient kept scheduled appointments due by today? N/A   Comments Appt with PCP tomorrow   What is the Home health agency?  current with [facility]   Has home health visited the patient within 72 hours of discharge? Yes   Home health comments RN and PT coming in   Psychosocial issues? No   Does the patient require any assistance with activities of daily living such as eating, bathing, dressing, walking, etc.? No   ADL comments Needs help with showering    Does the patient have any residual symptoms from stroke/TIA? Yes   Does the patient understand the diet ordered at discharge? Yes  [Watching CHO and Na+]   Did the patient receive a copy of their discharge instructions? Yes   Nursing interventions Reviewed instructions with patient   What is the patient's perception of their health status since discharge? Improving   Nursing interventions Nurse provided patient education   Is the patient able to teach back FAST for Stroke? Yes   Is the patient/caregiver able to  teach back the risk factors for a stroke? High blood pressure-goal below 120/80,  High Cholesterol,  Physical inactivity and obesity   Is the patient/caregiver able to teach back signs and symptoms related to disease process for when to call PCP? Yes   Is the patient/caregiver able to teach back signs and symptoms related to disease process for when to call 911? Yes   Is the patient/caregiver able to teach back the hierarchy of who to call/visit for symptoms/problems? PCP, Specialist, Home health nurse, Urgent Care, ED, 911 Yes   Additional teach back comments Reviewed FAST with pt   Week 1 call completed? Yes          Kaila Madrigal RN

## 2022-03-10 ENCOUNTER — READMISSION MANAGEMENT (OUTPATIENT)
Dept: CALL CENTER | Facility: HOSPITAL | Age: 77
End: 2022-03-10

## 2022-03-10 NOTE — OUTREACH NOTE
Stroke Week 2 Survey    Flowsheet Row Responses   [facility] discharged from? [facility]   Does the patient have one of the following disease processes/diagnoses(primary or secondary)? Stroke (TIA)   Week 2 attempt successful? Yes   Call start time 1253   Call end time 1303   Discharge diagnosis TIA   Meds reviewed with patient/caregiver? Yes   Is the patient having any side effects they believe may be caused by any medication additions or changes? No   Does the patient have all medications ordered at discharge? Yes   Is the patient taking all medications as directed (includes completed medication regime)? Yes   Medication comments Wife reports patient that is taking insulin as ordered-   Does the patient have a primary care provider?  Yes   Does the patient have an appointment with their PCP within 7 days of discharge? Yes   Has the patient kept scheduled appointments due by today? Yes   What is the Home health agency?  current with [facility]   Has home health visited the patient within 72 hours of discharge? Yes   Home health comments RN and PT---encouraged wife to discuss further educational needs regarding diabetes with her  nurse   Psychosocial issues? No   Does the patient require any assistance with activities of daily living such as eating, bathing, dressing, walking, etc.? No   Does the patient have any residual symptoms from stroke/TIA? No   Does the patient understand the diet ordered at discharge? Yes   Did the patient receive a copy of their discharge instructions? Yes   Nursing interventions Reviewed instructions with patient   What is the patient's perception of their health status since discharge? Improving  [Claueint is doing well--wife is managing his BG with his diet and insulin but needs more education, this Rn provided education and advised her to review her AVS and discuss with HH team.  BG have been about 260 with decreases to 100 at times. ]   Nursing interventions Nurse provided  patient education   Is the patient able to teach back FAST for Stroke? Yes   Is the patient/caregiver able to teach back the risk factors for a stroke? High blood pressure-goal below 120/80, Diabetes, High Cholesterol   Is the patient/caregiver able to teach back signs and symptoms related to disease process for when to call PCP? Yes   Is the patient/caregiver able to teach back signs and symptoms related to disease process for when to call 911? Yes   Is the patient/caregiver able to teach back the hierarchy of who to call/visit for symptoms/problems? PCP, Specialist, Home health nurse, Urgent Care, ED, 911 Yes   Week 2 call completed? Yes          EMILE BOSTON - Registered Nurse

## 2022-03-14 ENCOUNTER — TELEPHONE (OUTPATIENT)
Dept: DIABETES SERVICES | Facility: HOSPITAL | Age: 77
End: 2022-03-14

## 2022-03-14 NOTE — TELEPHONE ENCOUNTER
Wife answered call. Wife is giving pt his insulin and checking pt's bs. Pt is receiving Lantus 10 units hs and wife has been checking bs 2-3 times/day. She states MD is ok with wife checking pt's bs bid. Wife states bs running in 200s still. Pt has seen MD since discharge and has follow up appt on 4/13/2022. MD prescribed Farxiga but insurance would not cover. Wife states she has notified MD office of this. Wife asking about Medical Supply Companies. Gave names/contact # of some companies pt can receive bs monitoring supplies from. Wife with no additional questions.

## 2022-03-21 ENCOUNTER — READMISSION MANAGEMENT (OUTPATIENT)
Dept: CALL CENTER | Facility: HOSPITAL | Age: 77
End: 2022-03-21

## 2022-03-21 NOTE — OUTREACH NOTE
Stroke Week 3 Survey    Flowsheet Row Responses   [facility] patient discharged from? [facility]   Does the patient have one of the following disease processes/diagnoses(primary or secondary)? Stroke (TIA)   Week 3 attempt successful? Yes   Call start time 1559   Call end time 1604   Discharge diagnosis TIA   Meds reviewed with patient/caregiver? Yes   Is the patient having any side effects they believe may be caused by any medication additions or changes? No   Does the patient have all medications ordered at discharge? Yes   Is the patient taking all medications as directed (includes completed medication regime)? Yes   Does the patient have a primary care provider?  Yes   Does the patient have an appointment with their PCP within 7 days of discharge? Yes   Has the patient kept scheduled appointments due by today? Yes   What is the Home health agency?  current with [facility]   Has home health visited the patient within 72 hours of discharge? Yes   Psychosocial issues? No   Does the patient require any assistance with activities of daily living such as eating, bathing, dressing, walking, etc.? No   Does the patient have any residual symptoms from stroke/TIA? No   Does the patient understand the diet ordered at discharge? Yes   Comments states still in process of learning how to manage blood sugars, is now on insulin,  still in 200-250's, is managed by PCP   Did the patient receive a copy of their discharge instructions? Yes   Nursing interventions Reviewed instructions with patient   What is the patient's perception of their health status since discharge? Improving   Nursing interventions Nurse provided patient education   Is the patient able to teach back FAST for Stroke? Yes   Is the patient/caregiver able to teach back the risk factors for a stroke? History of TIAs, Diabetes, Physical inactivity and obesity, Carotid or other artery disease, High blood pressure-goal below 120/80   Is the patient/caregiver able  to teach back signs and symptoms related to disease process for when to call PCP? Yes   Is the patient/caregiver able to teach back signs and symptoms related to disease process for when to call 911? Yes   If the patient is a current smoker, are they able to teach back resources for cessation? Not a smoker   Is the patient/caregiver able to teach back the hierarchy of who to call/visit for symptoms/problems? PCP, Specialist, Home health nurse, Urgent Care, ED, 911 Yes   Additional teach back comments states learning how to follow a DM diet   Week 3 call completed? Yes          KYLE CLARK - Registered Nurse

## 2022-03-30 ENCOUNTER — READMISSION MANAGEMENT (OUTPATIENT)
Dept: CALL CENTER | Facility: HOSPITAL | Age: 77
End: 2022-03-30

## 2022-03-30 NOTE — OUTREACH NOTE
Stroke Week 4 Survey    Flowsheet Row Responses   [facility] patient discharged from? [facility]   Does the patient have one of the following disease processes/diagnoses(primary or secondary)? Stroke (TIA)   Week 4 attempt successful? Yes   Call start time 1130   Call end time 1134   Discharge diagnosis TIA   Meds reviewed with patient/caregiver? Yes   Is the patient taking all medications as directed (includes completed medication regime)? Yes   Medication comments is on Insulin bg 230 this morning   Has the patient kept scheduled appointments due by today? Yes   Home health comments  nurse to come today   Psychosocial issues? No   Does the patient require any assistance with activities of daily living such as eating, bathing, dressing, walking, etc.? No   Does the patient have any residual symptoms from stroke/TIA? No   Does the patient understand the diet ordered at discharge? Yes   Comments Still in process of learning how to manage blood sugars, 's this morning.    What is the patient's perception of their health status since discharge? Improving   Nursing interventions Nurse provided patient education   Is the patient able to teach back FAST for Stroke? Yes   Is the patient/caregiver able to teach back the risk factors for a stroke? High blood pressure-goal below 120/80, Diabetes, History of TIAs, High Cholesterol   Is the patient/caregiver able to teach back signs and symptoms related to disease process for when to call PCP? Yes   Is the patient/caregiver able to teach back signs and symptoms related to disease process for when to call 911? Yes   If the patient is a current smoker, are they able to teach back resources for cessation? Not a smoker   Is the patient/caregiver able to teach back the hierarchy of who to call/visit for symptoms/problems? PCP, Specialist, Home health nurse, Urgent Care, ED, 911 Yes   Week 4 Call Completed? Yes   Would the patient like one additional call? No   Graduated Yes    Is the patient interested in additional calls from an ambulatory ?  NOTE:  applies to high risk patients requiring additional follow-up. No   Did the patient feel the follow up calls were helpful during their recovery period? Yes   Was the number of calls appropriate? Yes   Does the patient have an Advance Directive or Living Will? No   Is the patient/caregiver familiar with Advance Care Planning? Yes   Would the patient like more information on Advance Care Planning? No   Wrap up additional comments Pt states they are still working on control of his BG. No other needs identified.           AGUILAR ISLAS - Registered Nurse

## 2022-10-19 ENCOUNTER — HOSPITAL ENCOUNTER (EMERGENCY)
Facility: HOSPITAL | Age: 77
Discharge: LEFT BEFORE DISCHARGE | End: 2022-10-19
Attending: EMERGENCY MEDICINE

## 2022-10-19 VITALS
RESPIRATION RATE: 20 BRPM | SYSTOLIC BLOOD PRESSURE: 144 MMHG | BODY MASS INDEX: 42.06 KG/M2 | HEIGHT: 69 IN | WEIGHT: 284 LBS | TEMPERATURE: 98 F | HEART RATE: 99 BPM | DIASTOLIC BLOOD PRESSURE: 77 MMHG | OXYGEN SATURATION: 93 %

## 2022-10-19 LAB — GLUCOSE BLDC GLUCOMTR-MCNC: 286 MG/DL (ref 70–105)

## 2022-10-19 PROCEDURE — 99211 OFF/OP EST MAY X REQ PHY/QHP: CPT | Performed by: EMERGENCY MEDICINE

## 2022-10-19 PROCEDURE — 82962 GLUCOSE BLOOD TEST: CPT

## 2023-01-11 ENCOUNTER — TELEPHONE (OUTPATIENT)
Dept: ENDOCRINOLOGY | Facility: CLINIC | Age: 78
End: 2023-01-11
Payer: MEDICARE

## 2023-01-11 NOTE — TELEPHONE ENCOUNTER
Recv'd request from [facility] for a [place] sensor. However I cannot complete at this time since has not been seen for a visit yet. Pt notified.

## 2023-05-10 RX ORDER — PROMETHAZINE HYDROCHLORIDE 25 MG/1
TABLET ORAL
COMMUNITY

## 2023-05-10 RX ORDER — CLOBETASOL PROPIONATE 0.5 MG/G
CREAM TOPICAL
COMMUNITY
Start: 2023-03-19

## 2023-05-10 RX ORDER — PEN NEEDLE, DIABETIC 31 GX5/16"
NEEDLE, DISPOSABLE MISCELLANEOUS
COMMUNITY
Start: 2023-01-25

## 2023-05-10 RX ORDER — FERROUS SULFATE 325(65) MG
TABLET ORAL
COMMUNITY
Start: 2022-05-18

## 2023-05-10 RX ORDER — ATORVASTATIN CALCIUM 20 MG/1
TABLET, FILM COATED ORAL
COMMUNITY
Start: 2022-05-18

## 2023-05-10 RX ORDER — ONDANSETRON 4 MG/1
TABLET, ORALLY DISINTEGRATING ORAL
COMMUNITY
Start: 2022-05-16

## 2023-12-28 ENCOUNTER — HOSPITAL ENCOUNTER (OUTPATIENT)
Facility: HOSPITAL | Age: 78
Discharge: HOME | End: 2023-12-28
Attending: EMERGENCY MEDICINE | Admitting: EMERGENCY MEDICINE
Payer: MEDICARE

## 2023-12-28 VITALS
OXYGEN SATURATION: 93 % | RESPIRATION RATE: 18 BRPM | BODY MASS INDEX: 37.92 KG/M2 | WEIGHT: 256 LBS | DIASTOLIC BLOOD PRESSURE: 68 MMHG | HEIGHT: 69 IN | SYSTOLIC BLOOD PRESSURE: 130 MMHG | TEMPERATURE: 99.2 F | HEART RATE: 98 BPM

## 2023-12-28 DIAGNOSIS — J06.9 VIRAL UPPER RESPIRATORY ILLNESS: Primary | ICD-10-CM

## 2023-12-28 DIAGNOSIS — J30.9 ALLERGIC RHINITIS, UNSPECIFIED SEASONALITY, UNSPECIFIED TRIGGER: ICD-10-CM

## 2023-12-28 LAB
FLUAV SUBTYP SPEC NAA+PROBE: NOT DETECTED
FLUBV RNA ISLT QL NAA+PROBE: NOT DETECTED
SARS-COV-2 RNA RESP QL NAA+PROBE: NOT DETECTED

## 2023-12-28 PROCEDURE — G0463 HOSPITAL OUTPT CLINIC VISIT: HCPCS | Performed by: NURSE PRACTITIONER

## 2023-12-28 PROCEDURE — 87636 SARSCOV2 & INF A&B AMP PRB: CPT | Performed by: EMERGENCY MEDICINE

## 2023-12-28 NOTE — FSED PROVIDER NOTE
Subjective   History of Present Illness  The patient is a 78-year-old male presents to the ER with headache, cough, congestion, runny nose that started 3 to 4 days ago.  Patient denies any fevers, nausea or vomiting.  Patient's wife and son are being evaluated for the same symptoms.    History provided by:  Patient   used: No        Review of Systems   HENT:  Positive for congestion, rhinorrhea, sinus pressure, sinus pain and sore throat.    Respiratory:  Positive for cough.    Neurological:  Positive for headaches.       Past Medical History:   Diagnosis Date    Diabetes mellitus 2001    Type 2    Diabetic peripheral neuropathy 2/22/2022    Essential hypertension 2/22/2022    Iron deficiency anemia 7/24/2020    Mild depression 2/22/2022    Peripheral vascular disease 2/23/2022    Restless leg syndrome 2/22/2022    Type 2 diabetes mellitus with hyperglycemia 2/22/2022       No Known Allergies    No past surgical history on file.    Family History   Problem Relation Age of Onset    No Known Problems Mother     No Known Problems Father        Social History     Socioeconomic History    Marital status:    Tobacco Use    Smoking status: Never   Substance and Sexual Activity    Alcohol use: Not Currently    Drug use: Never    Sexual activity: Defer           Objective   Physical Exam  Vitals and nursing note reviewed.   Constitutional:       Appearance: Normal appearance. He is normal weight.   HENT:      Head: Normocephalic and atraumatic.      Right Ear: Tympanic membrane, ear canal and external ear normal.      Left Ear: Tympanic membrane, ear canal and external ear normal.      Nose: Nose normal.      Mouth/Throat:      Lips: Pink.      Mouth: Mucous membranes are moist.      Pharynx: Oropharynx is clear. Uvula midline.   Eyes:      Extraocular Movements: Extraocular movements intact.      Conjunctiva/sclera: Conjunctivae normal.      Pupils: Pupils are equal, round, and reactive to light.    Cardiovascular:      Rate and Rhythm: Normal rate and regular rhythm.      Pulses: Normal pulses.      Heart sounds: Normal heart sounds.   Pulmonary:      Effort: Pulmonary effort is normal.      Breath sounds: Normal breath sounds and air entry.   Abdominal:      General: Bowel sounds are normal.      Palpations: Abdomen is soft.   Musculoskeletal:         General: Normal range of motion.      Cervical back: Full passive range of motion without pain, normal range of motion and neck supple.   Skin:     General: Skin is warm and dry.   Neurological:      General: No focal deficit present.      Mental Status: He is alert and oriented to person, place, and time.   Psychiatric:         Mood and Affect: Mood normal.         Behavior: Behavior normal. Behavior is cooperative.         Procedures           ED Course  ED Course as of 12/28/23 1701   Thu Dec 28, 2023   1544 COVID19: Not Detected [DS]   1544 Influenza A PCR: Not Detected [DS]   1544 Influenza B PCR: Not Detected [DS]      ED Course User Index  [DS] Tracy Saucedo APRN                                           Medical Decision Making  The patient is a 78-year-old male presents to the ER with headache, cough, congestion, runny nose that started 3 to 4 days ago.  Patient denies any fevers, nausea or vomiting.  Patient's wife and son are being evaluated for the same symptoms.  This patient presents with symptoms suspicious for likely viral upper respiratory infection. Based on history and physical doubt sinusitis. COVID influenza of all negative at this time.  Do not suspect underlying cardiopulmonary process. I considered, but think unlikely, dangerous causes of this patient's symptoms to include ACS, CHF or COPD exacerbations, pneumonia, pneumothorax. Patient is nontoxic appearing and not in need of emergent medical intervention.  Patient advised to follow-up with primary care for further evaluation and treatment.  Patient also advised if he develops  increased fevers, nausea or vomiting needs to be reevaluated.    Problems Addressed:  Allergic rhinitis, unspecified seasonality, unspecified trigger: acute illness or injury  Viral upper respiratory illness: acute illness or injury    Amount and/or Complexity of Data Reviewed  Labs:  Decision-making details documented in ED Course.    Risk  OTC drugs.        Final diagnoses:   Viral upper respiratory illness   Allergic rhinitis, unspecified seasonality, unspecified trigger       ED Disposition  ED Disposition       ED Disposition   Discharge    Condition   Stable    Comment   --               Maddie Huang, APRN  [address] [facility] [place] [address] [phone]    Schedule an appointment as soon as possible for a visit in 1 week  As needed, If symptoms worsen         Medication List      No changes were made to your prescriptions during this visit.

## 2023-12-28 NOTE — DISCHARGE INSTRUCTIONS
Follow-up with primary care for further evaluation and treatment.      Tylenol/Motrin as needed for pain/fevers    You can get Allegra-D, Zyrtec-D, Claritin-D at the pharmacy make much of your ID and asked the pharmacist for this medication.    Make sure you are drinking plenty of fluids    If you develop increased fever that does not respond to Tylenol or Motrin you develop nausea vomiting or unable to keep anything down you need to get reevaluated.

## 2024-08-28 ENCOUNTER — APPOINTMENT (OUTPATIENT)
Dept: CT IMAGING | Facility: HOSPITAL | Age: 79
DRG: 552 | End: 2024-08-28
Payer: MEDICARE

## 2024-08-28 ENCOUNTER — HOSPITAL ENCOUNTER (INPATIENT)
Facility: HOSPITAL | Age: 79
LOS: 3 days | Discharge: NURSING, REHAB OR LONG-TERM CARE | DRG: 552 | End: 2024-09-01
Attending: EMERGENCY MEDICINE | Admitting: STUDENT IN AN ORGANIZED HEALTH CARE EDUCATION/TRAINING PROGRAM
Payer: MEDICARE

## 2024-08-28 DIAGNOSIS — M48.00 CENTRAL STENOSIS OF SPINAL CANAL: ICD-10-CM

## 2024-08-28 DIAGNOSIS — R27.0 ATAXIA: ICD-10-CM

## 2024-08-28 DIAGNOSIS — J01.30 ACUTE NON-RECURRENT SPHENOIDAL SINUSITIS: ICD-10-CM

## 2024-08-28 DIAGNOSIS — R55 NEAR SYNCOPE: Primary | ICD-10-CM

## 2024-08-28 DIAGNOSIS — M51.36 LUMBAR DEGENERATIVE DISC DISEASE: ICD-10-CM

## 2024-08-28 DIAGNOSIS — M47.816 SPONDYLOSIS OF LUMBAR REGION WITHOUT MYELOPATHY OR RADICULOPATHY: ICD-10-CM

## 2024-08-28 DIAGNOSIS — M54.50 ACUTE BILATERAL LOW BACK PAIN WITHOUT SCIATICA: ICD-10-CM

## 2024-08-28 DIAGNOSIS — E11.42 TYPE 2 DIABETES MELLITUS WITH DIABETIC POLYNEUROPATHY, UNSPECIFIED WHETHER LONG TERM INSULIN USE: ICD-10-CM

## 2024-08-28 DIAGNOSIS — R53.1 WEAKNESS: ICD-10-CM

## 2024-08-28 DIAGNOSIS — M48.07 NEUROFORAMINAL STENOSIS OF LUMBOSACRAL SPINE: ICD-10-CM

## 2024-08-28 PROBLEM — M54.9 BACK PAIN: Status: ACTIVE | Noted: 2024-08-28

## 2024-08-28 LAB
ALBUMIN SERPL-MCNC: 3.9 G/DL (ref 3.5–5.2)
ALBUMIN/GLOB SERPL: 1.1 G/DL
ALP SERPL-CCNC: 93 U/L (ref 39–117)
ALT SERPL W P-5'-P-CCNC: 14 U/L (ref 1–41)
AMMONIA BLD-SCNC: 19 UMOL/L (ref 16–60)
ANION GAP SERPL CALCULATED.3IONS-SCNC: 9.7 MMOL/L (ref 5–15)
AST SERPL-CCNC: 31 U/L (ref 1–40)
BACTERIA UR QL AUTO: NORMAL /HPF
BASOPHILS # BLD AUTO: 0.05 10*3/MM3 (ref 0–0.2)
BASOPHILS NFR BLD AUTO: 0.6 % (ref 0–1.5)
BILIRUB SERPL-MCNC: 0.7 MG/DL (ref 0–1.2)
BILIRUB UR QL STRIP: NEGATIVE
BUN SERPL-MCNC: 19 MG/DL (ref 8–23)
BUN/CREAT SERPL: 17.6 (ref 7–25)
CALCIUM SPEC-SCNC: 9.5 MG/DL (ref 8.6–10.5)
CHLORIDE SERPL-SCNC: 100 MMOL/L (ref 98–107)
CK SERPL-CCNC: 55 U/L (ref 20–200)
CLARITY UR: CLEAR
CO2 SERPL-SCNC: 29.3 MMOL/L (ref 22–29)
COLOR UR: ABNORMAL
CREAT SERPL-MCNC: 1.08 MG/DL (ref 0.76–1.27)
DEPRECATED RDW RBC AUTO: 51.3 FL (ref 37–54)
EGFRCR SERPLBLD CKD-EPI 2021: 69.8 ML/MIN/1.73
EOSINOPHIL # BLD AUTO: 0.24 10*3/MM3 (ref 0–0.4)
EOSINOPHIL NFR BLD AUTO: 2.7 % (ref 0.3–6.2)
ERYTHROCYTE [DISTWIDTH] IN BLOOD BY AUTOMATED COUNT: 14.2 % (ref 12.3–15.4)
GLOBULIN UR ELPH-MCNC: 3.5 GM/DL
GLUCOSE BLDC GLUCOMTR-MCNC: 142 MG/DL (ref 70–105)
GLUCOSE BLDC GLUCOMTR-MCNC: 200 MG/DL (ref 70–105)
GLUCOSE SERPL-MCNC: 155 MG/DL (ref 65–99)
GLUCOSE UR STRIP-MCNC: NEGATIVE MG/DL
HCT VFR BLD AUTO: 37.6 % (ref 37.5–51)
HGB BLD-MCNC: 12 G/DL (ref 13–17.7)
HGB UR QL STRIP.AUTO: NEGATIVE
HYALINE CASTS UR QL AUTO: NORMAL /LPF
IMM GRANULOCYTES # BLD AUTO: 0.02 10*3/MM3 (ref 0–0.05)
IMM GRANULOCYTES NFR BLD AUTO: 0.2 % (ref 0–0.5)
KETONES UR QL STRIP: ABNORMAL
LEUKOCYTE ESTERASE UR QL STRIP.AUTO: ABNORMAL
LYMPHOCYTES # BLD AUTO: 2.47 10*3/MM3 (ref 0.7–3.1)
LYMPHOCYTES NFR BLD AUTO: 27.3 % (ref 19.6–45.3)
MAGNESIUM SERPL-MCNC: 1.9 MG/DL (ref 1.6–2.4)
MCH RBC QN AUTO: 31.4 PG (ref 26.6–33)
MCHC RBC AUTO-ENTMCNC: 31.9 G/DL (ref 31.5–35.7)
MCV RBC AUTO: 98.4 FL (ref 79–97)
MONOCYTES # BLD AUTO: 1.04 10*3/MM3 (ref 0.1–0.9)
MONOCYTES NFR BLD AUTO: 11.5 % (ref 5–12)
NEUTROPHILS NFR BLD AUTO: 5.23 10*3/MM3 (ref 1.7–7)
NEUTROPHILS NFR BLD AUTO: 57.7 % (ref 42.7–76)
NITRITE UR QL STRIP: NEGATIVE
NRBC BLD AUTO-RTO: 0 /100 WBC (ref 0–0.2)
PH UR STRIP.AUTO: <=5 [PH] (ref 5–8)
PLATELET # BLD AUTO: 175 10*3/MM3 (ref 140–450)
PMV BLD AUTO: 11 FL (ref 6–12)
POTASSIUM SERPL-SCNC: 4.5 MMOL/L (ref 3.5–5.2)
PROT SERPL-MCNC: 7.4 G/DL (ref 6–8.5)
PROT UR QL STRIP: NEGATIVE
QT INTERVAL: 390 MS
QTC INTERVAL: 484 MS
RBC # BLD AUTO: 3.82 10*6/MM3 (ref 4.14–5.8)
RBC # UR STRIP: NORMAL /HPF
REF LAB TEST METHOD: NORMAL
SODIUM SERPL-SCNC: 139 MMOL/L (ref 136–145)
SP GR UR STRIP: 1.02 (ref 1–1.03)
SQUAMOUS #/AREA URNS HPF: NORMAL /HPF
TROPONIN T SERPL HS-MCNC: 14 NG/L
TSH SERPL DL<=0.05 MIU/L-ACNC: 3.74 UIU/ML (ref 0.27–4.2)
UROBILINOGEN UR QL STRIP: ABNORMAL
WBC # UR STRIP: NORMAL /HPF
WBC NRBC COR # BLD AUTO: 9.05 10*3/MM3 (ref 3.4–10.8)

## 2024-08-28 PROCEDURE — 93005 ELECTROCARDIOGRAM TRACING: CPT | Performed by: EMERGENCY MEDICINE

## 2024-08-28 PROCEDURE — 82550 ASSAY OF CK (CPK): CPT | Performed by: EMERGENCY MEDICINE

## 2024-08-28 PROCEDURE — 70450 CT HEAD/BRAIN W/O DYE: CPT

## 2024-08-28 PROCEDURE — 25010000002 CEFTRIAXONE PER 250 MG: Performed by: EMERGENCY MEDICINE

## 2024-08-28 PROCEDURE — 81001 URINALYSIS AUTO W/SCOPE: CPT | Performed by: EMERGENCY MEDICINE

## 2024-08-28 PROCEDURE — G0378 HOSPITAL OBSERVATION PER HR: HCPCS

## 2024-08-28 PROCEDURE — 80053 COMPREHEN METABOLIC PANEL: CPT | Performed by: EMERGENCY MEDICINE

## 2024-08-28 PROCEDURE — 82948 REAGENT STRIP/BLOOD GLUCOSE: CPT

## 2024-08-28 PROCEDURE — 84443 ASSAY THYROID STIM HORMONE: CPT | Performed by: EMERGENCY MEDICINE

## 2024-08-28 PROCEDURE — 83735 ASSAY OF MAGNESIUM: CPT | Performed by: EMERGENCY MEDICINE

## 2024-08-28 PROCEDURE — 85025 COMPLETE CBC W/AUTO DIFF WBC: CPT | Performed by: EMERGENCY MEDICINE

## 2024-08-28 PROCEDURE — 72131 CT LUMBAR SPINE W/O DYE: CPT

## 2024-08-28 PROCEDURE — 99285 EMERGENCY DEPT VISIT HI MDM: CPT

## 2024-08-28 PROCEDURE — 84484 ASSAY OF TROPONIN QUANT: CPT | Performed by: EMERGENCY MEDICINE

## 2024-08-28 PROCEDURE — 82140 ASSAY OF AMMONIA: CPT | Performed by: EMERGENCY MEDICINE

## 2024-08-28 PROCEDURE — 25010000002 MORPHINE PER 10 MG: Performed by: EMERGENCY MEDICINE

## 2024-08-28 PROCEDURE — 63710000001 INSULIN LISPRO (HUMAN) PER 5 UNITS

## 2024-08-28 PROCEDURE — 25010000002 ONDANSETRON PER 1 MG: Performed by: EMERGENCY MEDICINE

## 2024-08-28 RX ORDER — NICOTINE POLACRILEX 4 MG
15 LOZENGE BUCCAL
Status: DISCONTINUED | OUTPATIENT
Start: 2024-08-28 | End: 2024-08-30

## 2024-08-28 RX ORDER — INSULIN LISPRO 100 [IU]/ML
2-9 INJECTION, SOLUTION INTRAVENOUS; SUBCUTANEOUS
Status: DISCONTINUED | OUTPATIENT
Start: 2024-08-28 | End: 2024-08-30

## 2024-08-28 RX ORDER — IBUPROFEN 600 MG/1
1 TABLET ORAL
Status: DISCONTINUED | OUTPATIENT
Start: 2024-08-28 | End: 2024-08-30

## 2024-08-28 RX ORDER — MULTIPLE VITAMINS W/ MINERALS TAB 9MG-400MCG
1 TAB ORAL DAILY
COMMUNITY

## 2024-08-28 RX ORDER — HYDROCODONE BITARTRATE AND ACETAMINOPHEN 5; 325 MG/1; MG/1
1 TABLET ORAL ONCE AS NEEDED
Status: DISCONTINUED | OUTPATIENT
Start: 2024-08-28 | End: 2024-09-01 | Stop reason: HOSPADM

## 2024-08-28 RX ORDER — INSULIN GLARGINE 300 U/ML
80 INJECTION, SOLUTION SUBCUTANEOUS
Status: ON HOLD | COMMUNITY
End: 2024-09-01

## 2024-08-28 RX ORDER — ACETAMINOPHEN 325 MG/1
650 TABLET ORAL EVERY 4 HOURS PRN
Status: DISCONTINUED | OUTPATIENT
Start: 2024-08-28 | End: 2024-09-01 | Stop reason: HOSPADM

## 2024-08-28 RX ORDER — ONDANSETRON 2 MG/ML
4 INJECTION INTRAMUSCULAR; INTRAVENOUS ONCE
Status: COMPLETED | OUTPATIENT
Start: 2024-08-28 | End: 2024-08-28

## 2024-08-28 RX ORDER — OXYCODONE HYDROCHLORIDE 5 MG/1
5 TABLET ORAL EVERY 4 HOURS PRN
Status: DISCONTINUED | OUTPATIENT
Start: 2024-08-28 | End: 2024-09-01 | Stop reason: HOSPADM

## 2024-08-28 RX ORDER — CALCIUM CARBONATE 500 MG/1
2 TABLET, CHEWABLE ORAL 3 TIMES DAILY PRN
Status: DISCONTINUED | OUTPATIENT
Start: 2024-08-28 | End: 2024-09-01 | Stop reason: HOSPADM

## 2024-08-28 RX ORDER — ONDANSETRON 4 MG/1
4 TABLET, ORALLY DISINTEGRATING ORAL EVERY 6 HOURS PRN
Status: DISCONTINUED | OUTPATIENT
Start: 2024-08-28 | End: 2024-09-01 | Stop reason: HOSPADM

## 2024-08-28 RX ORDER — DEXTROSE MONOHYDRATE 25 G/50ML
25 INJECTION, SOLUTION INTRAVENOUS
Status: DISCONTINUED | OUTPATIENT
Start: 2024-08-28 | End: 2024-08-30

## 2024-08-28 RX ADMIN — CEFTRIAXONE 2000 MG: 2 INJECTION, POWDER, FOR SOLUTION INTRAMUSCULAR; INTRAVENOUS at 17:42

## 2024-08-28 RX ADMIN — CALCIUM CARBONATE (ANTACID) CHEW TAB 500 MG 2 TABLET: 500 CHEW TAB at 23:57

## 2024-08-28 RX ADMIN — MORPHINE SULFATE 4 MG: 4 INJECTION, SOLUTION INTRAMUSCULAR; INTRAVENOUS at 14:08

## 2024-08-28 RX ADMIN — INSULIN LISPRO 4 UNITS: 100 INJECTION, SOLUTION INTRAVENOUS; SUBCUTANEOUS at 20:21

## 2024-08-28 RX ADMIN — ONDANSETRON 4 MG: 2 INJECTION INTRAMUSCULAR; INTRAVENOUS at 14:08

## 2024-08-28 NOTE — ED PROVIDER NOTES
Subjective   History of Present Illness  79-year-old male fell after getting tangled up with his walker today.  He states over the last 3 days he has had some headache but denies being dizzy.  He states a couple times he felt like he was going to pass out.  He states that he has difficulty moving because he has had increasing back pain.  He states that he is a diabetic but does not routinely check his sugars.  He reports decrease in lower extremity sensation.  His wife says that he weighs too much for her to get off the floor.  He reports no episodes of diaphoresis or palpitations reports no focal findings or lateralizing signs  Patient reports some recent subjective fever and an increase in his underlying sinus congestion    Review of Systems   Constitutional:  Negative for unexpected weight change.   HENT:  Positive for sinus pressure and sneezing.    Musculoskeletal:  Positive for back pain.   Neurological:  Positive for weakness, light-headedness and numbness.       Past Medical History:   Diagnosis Date    Diabetes mellitus 2001    Type 2    Diabetic peripheral neuropathy 2/22/2022    Essential hypertension 2/22/2022    Iron deficiency anemia 7/24/2020    Mild depression 2/22/2022    Peripheral vascular disease 2/23/2022    Restless leg syndrome 2/22/2022    Type 2 diabetes mellitus with hyperglycemia 2/22/2022       No Known Allergies    No past surgical history on file.    Family History   Problem Relation Age of Onset    No Known Problems Mother     No Known Problems Father        Social History     Socioeconomic History    Marital status:    Tobacco Use    Smoking status: Never   Substance and Sexual Activity    Alcohol use: Not Currently    Drug use: Never    Sexual activity: Defer           Objective   Physical Exam  Alert [place] Coma Scale 15 NIH 0   HEENT: Pupils equal and reactive to light. Conjunctivae are not injected. Normal tympanic membranes. Oropharynx and nares are normal.   Neck:  Supple. Midline trachea. No JVD. No goiter.   Chest: Clear and equal breath sounds bilaterally, regular rate and rhythm without murmur or rub.   Abdomen: Positive bowel sounds, nontender, nondistended. No rebound or peritoneal signs. No CVA tenderness.   Lumbar paraspinal muscle discomfort there is bilateral SI joint tenderness equivocally positive straight leg raising test bilaterally  Extremities no clubbing. cyanosis or edema. Motor sensory exam is normal. The full range of motion is intact   Skin: Warm and dry, no rashes or petechia.   Lymphatic: No regional lymphadenopathy. No calf pain, swelling or Homans sign    Procedures           ED Course  ED Course as of 08/28/24 1659   Wed Aug 28, 2024   1629 ED overflow/overcrowding conditions [TH]      ED Course User Index  [TH] Carlton Valero MD                          Total (NIH Stroke Scale): 0                  Medical Decision Making  The patient was evaluated in the emergency department.  He and his wife complained about the long stay despite the ED overflow and overcrowding conditions.  The patient was injected with ceftriaxone.  Pain was relieved in the emergency department.  The patient will be admitted and will need physical therapy evaluation.  He was agreeable to this plan of treatment.  The patient will need monitoring to evaluate for arrhythmia although this appears less likely at this time.  The patient would appear to be benefited by the PT OT evaluation and potentially rehab    Amount and/or Complexity of Data Reviewed  Labs: ordered.  Radiology: ordered.    Risk  Prescription drug management.  Decision regarding hospitalization.        Final diagnoses:   Near syncope   Weakness   Ataxia   Acute bilateral low back pain without sciatica   Acute non-recurrent sphenoidal sinusitis   Lumbar degenerative disc disease   Spondylosis of lumbar region without myelopathy or radiculopathy   Central stenosis of spinal canal   Neuroforaminal stenosis of  lumbosacral spine   Type 2 diabetes mellitus with diabetic polyneuropathy, unspecified whether long term insulin use       ED Disposition  ED Disposition       ED Disposition   Decision to Admit    Condition   --    Comment   Level of Care: Telemetry [5]   Diagnosis: Near syncope [[id number]]   Admitting Physician: NIVIA JIMENEZ [[id number]]   Attending Physician: NIVIA JIMENEZ [[id number]]                 No follow-up provider specified.       Medication List      No changes were made to your prescriptions during this visit.            Carlton Valero MD  08/28/24 9561

## 2024-08-28 NOTE — ED NOTES
Pt reports tripping and falling when walking through a doorway with his walker.  Pt sts he is not sure what happened or if his legs gave out.  Pt is AAO x 4.  Pt c/o lower back pain.  Pt denies hitting his head.

## 2024-08-28 NOTE — ED NOTES
Nursing report ED to floor  Ignacio Miranda  79 y.o.  male    HPI:   Chief Complaint   Patient presents with    Fall     Fall - while using walker.  Generalized weakness since yesterday.  Reports lower back pain.       Admitting doctor:   Ada Bradley MD    Admitting diagnosis:   The primary encounter diagnosis was Near syncope. Diagnoses of Weakness, Ataxia, Acute bilateral low back pain without sciatica, Acute non-recurrent sphenoidal sinusitis, Lumbar degenerative disc disease, Spondylosis of lumbar region without myelopathy or radiculopathy, Central stenosis of spinal canal, Neuroforaminal stenosis of lumbosacral spine, and Type 2 diabetes mellitus with diabetic polyneuropathy, unspecified whether long term insulin use were also pertinent to this visit.    Code status:   Current Code Status       Date Active Code Status Order ID Comments User Context       8/28/2024 1657 CPR (Attempt to Resuscitate) [id number]  Sahra Yu PA-C ED        Question Answer    Code Status (Patient has no pulse and is not breathing) CPR (Attempt to Resuscitate)    Medical Interventions (Patient has pulse or is breathing) Full Support                    Allergies:   Patient has no known allergies.    Isolation:  No active isolations     Fall Risk:  Fall Risk Assessment was completed, and patient is at high risk for falls.   Predictive Model Details         16 (Low) Factor Value    Calculated 8/28/2024 18:03 Age 79    Risk of Fall Model Active Peripheral IV Present     Imaging order in this encounter Present     Respiratory Rate 20     Magnesium 1.9 mg/dL     Number of Distinct Medication Classes administered 4     Addy Scale not on file     Diastolic BP 75     Chloride 100 mmol/L     Clinically Relevant Sex Not Female     Total Bilirubin 0.7 mg/dL     Albumin 3.9 g/dL     Number of administrations of Analgesic Narcotics 1     Days after Admission 0.193     Potassium 4.5 mmol/L     Calcium 9.5 mg/dL     Creatinine 1.08 mg/dL      ALT 14 U/L         Weight:       08/28/24  1321   Weight: 116 kg (255 lb 11.7 oz)       Intake and Output  No intake or output data in the 24 hours ending 08/28/24 1804    Diet:   Dietary Orders (From admission, onward)       Start     Ordered    08/28/24 1657  Diet: Cardiac, Diabetic; Healthy Heart (2-3 Na+); Consistent Carbohydrate; Fluid Consistency: Thin (IDDSI 0)  Diet Effective Now        References:    Diet Order [facility]   Question Answer Comment   Diets: Cardiac    Diets: Diabetic    Cardiac Diet: Healthy Heart (2-3 Na+)    Diabetic Diet: Consistent Carbohydrate    Fluid Consistency: Thin (IDDSI 0)        08/28/24 1657                     Most recent vitals:   Vitals:    08/28/24 1735 08/28/24 1740 08/28/24 1745 08/28/24 1750   BP:   133/75    Pulse: 96 92 94 95   Resp:       Temp:       TempSrc:       SpO2: 95% 95% 96% 95%   Weight:       Height:           Active LDAs/IV Access:   Lines, Drains & Airways       Active LDAs       Name Placement date Placement time Site Days    Peripheral IV 08/28/24 1400 Right Antecubital 08/28/24  1400  Antecubital  less than 1                    Skin Condition:   Skin Assessments (last day)       Date/Time Skin WDL Interval    08/28/24 13:33:31 WDL --    08/28/24 13:37:04 -- baseline             Labs (abnormal labs have a star):   Labs Reviewed   COMPREHENSIVE METABOLIC PANEL - Abnormal; Notable for the following components:       Result Value    Glucose 155 (*)     CO2 29.3 (*)     All other components within normal limits    Narrative:     GFR Normal >60  Chronic Kidney Disease <60  Kidney Failure <15    The GFR formula is only valid for adults with stable renal function between ages 18 and 70.   CBC WITH AUTO DIFFERENTIAL - Abnormal; Notable for the following components:    RBC 3.82 (*)     Hemoglobin 12.0 (*)     MCV 98.4 (*)     Monocytes, Absolute 1.04 (*)     All other components within normal limits   URINALYSIS W/ CULTURE IF INDICATED - Abnormal; Notable for  the following components:    Color, UA Dark Yellow (*)     Ketones, UA Trace (*)     Leuk Esterase, UA Trace (*)     All other components within normal limits    Narrative:     In absence of clinical symptoms, the presence of pyuria, bacteria, and/or nitrites on the urinalysis result does not correlate with infection.   POCT GLUCOSE FINGERSTICK - Abnormal; Notable for the following components:    Glucose 142 (*)     All other components within normal limits   SINGLE HS TROPONIN T - Normal    Narrative:     High Sensitive Troponin T Reference Range:  <14.0 ng/L- Negative Female for AMI  <22.0 ng/L- Negative Male for AMI  >=14 - Abnormal Female indicating possible myocardial injury.  >=22 - Abnormal Male indicating possible myocardial injury.   Clinicians would have to utilize clinical acumen, EKG, Troponin, and serial changes to determine if it is an Acute Myocardial Infarction or myocardial injury due to an underlying chronic condition.        CK - Normal   AMMONIA - Normal   MAGNESIUM - Normal   TSH - Normal   URINALYSIS, MICROSCOPIC ONLY   POCT GLUCOSE FINGERSTICK   POCT GLUCOSE FINGERSTICK   POCT GLUCOSE FINGERSTICK   POCT GLUCOSE FINGERSTICK   CBC AND DIFFERENTIAL    Narrative:     The following orders were created for panel order CBC & Differential.  Procedure                               Abnormality         Status                     ---------                               -----------         ------                     CBC Auto Differential[[id number]]        Abnormal            Final result                 Please view results for these tests on the individual orders.       LOC: Person, Place, Time, and Situation    Telemetry:  Telemetry    Cardiac Monitoring Ordered: yes    EKG:   ECG 12 Lead Syncope   Preliminary Result   HEART RATE=93  bpm   RR Dwsfkuur=657  ms   WI Ushshwvj=124  ms   P Horizontal Axis=13  deg   P Front Axis=45  deg   QRSD Fwgyxdiv=947  ms   QT Wvvjzavw=512  ms   CQzX=372  ms   QRS Axis=-66   deg   T Wave Axis=-7  deg   - ABNORMAL ECG -   Sinus rhythm   Prolonged TN interval   Right bundle branch block   Date and Time of Study:2024-08-28 17:11:55      Telemetry Scan   Final Result      Telemetry Scan   Final Result          Medications Given in the ED:   Medications   HYDROcodone-acetaminophen (NORCO) 5-325 MG per tablet 1 tablet (has no administration in time range)   cefTRIAXone (ROCEPHIN) 2,000 mg in sodium chloride 0.9 % 100 mL MBP (2,000 mg Intravenous New Bag 8/28/24 1742)   Potassium Replacement - Follow Nurse / BPA Driven Protocol (has no administration in time range)   Magnesium Standard Dose Replacement - Follow Nurse / BPA Driven Protocol (has no administration in time range)   Phosphorus Replacement - Follow Nurse / BPA Driven Protocol (has no administration in time range)   Calcium Replacement - Follow Nurse / BPA Driven Protocol (has no administration in time range)   ondansetron ODT (ZOFRAN-ODT) disintegrating tablet 4 mg (has no administration in time range)   oxyCODONE (ROXICODONE) immediate release tablet 5 mg (has no administration in time range)   acetaminophen (TYLENOL) tablet 650 mg (has no administration in time range)   melatonin tablet 5 mg (has no administration in time range)   dextrose (GLUTOSE) oral gel 15 g (has no administration in time range)   dextrose (D50W) (25 g/50 mL) IV injection 25 g (has no administration in time range)   glucagon (GLUCAGEN) injection 1 mg (has no administration in time range)   insulin lispro (HUMALOG/ADMELOG) injection 2-9 Units ( Subcutaneous Not Given 8/28/24 1747)   ondansetron (ZOFRAN) injection 4 mg (4 mg Intravenous Given 8/28/24 1408)   morphine injection 4 mg (4 mg Intravenous Given 8/28/24 1408)       Imaging results:  CT Lumbar Spine Without Contrast    Result Date: 8/28/2024  Impression: 1. Advanced degenerative changes in the lumbar spine. 2. No acute lumbar spine findings. 3. Please refer to the body the report for level by level  findings Electronically Signed: Caterina Delatorre MD  8/28/2024 2:08 PM EDT  Workstation ID: [id number]    CT Head Without Contrast    Result Date: 8/28/2024  Impression: No acute intracranial process. Evidence of acute sphenoid sinusitis. Electronically Signed: Taniya Rodriguez MD  8/28/2024 2:00 PM EDT  Workstation ID: [id number]     Social issues:   Social History     Socioeconomic History    Marital status:    Tobacco Use    Smoking status: Never   Substance and Sexual Activity    Alcohol use: Not Currently    Drug use: Never    Sexual activity: Defer       NIH Stroke Scale:  Interval: baseline (08/28/24 1337)  1a. Level of Consciousness: 0-->Alert, keenly responsive (08/28/24 1337)  1b. LOC Questions: 0-->Answers both questions correctly (08/28/24 1337)  1c. LOC Commands: 0-->Performs both tasks correctly (08/28/24 1337)  2. Best Gaze: 0-->Normal (08/28/24 1337)  3. Visual: 0-->No visual loss (08/28/24 1337)  4. Facial Palsy: 0-->Normal symmetrical movements (08/28/24 1337)  5a. Motor Arm, Left: 0-->No drift, limb holds 90 (or 45) degrees for full 10 secs (08/28/24 1337)  5b. Motor Arm, Right: 0-->No drift, limb holds 90 (or 45) degrees for full 10 secs (08/28/24 1337)  6a. Motor Leg, Left: 0-->No drift, leg holds 30 degree position for full 5 secs (08/28/24 1337)  6b. Motor Leg, Right: 0-->No drift, leg holds 30 degree position for full 5 secs (08/28/24 1337)  7. Limb Ataxia: 0-->Absent (08/28/24 1337)  8. Sensory: 0-->Normal, no sensory loss (08/28/24 1337)  9. Best Language: 0-->No aphasia, normal (08/28/24 1337)  10. Dysarthria: 0-->Normal (08/28/24 1337)  11. Extinction and Inattention (formerly Neglect): 0-->No abnormality (08/28/24 1337)    Total (NIH Stroke Scale): 0 (08/28/24 1337)     Additional notable assessment information:     Nursing report ED to floor:  2D    Tasha Owens LPN   08/28/24 18:04 EDT

## 2024-08-28 NOTE — H&P
"[facility] Medicine Services  History & Physical    Patient Name: Ignacio Miranda  : 1945  MRN: [id number]  Primary Care Physician:  Maddie Huang APRN  Date of admission: 2024  Date and Time of Service: 2024 at 1700    Subjective      Chief Complaint: fall, back pain     History of Present Illness: Ignacio Miranda is a 79 y.o. male with a CMH of T2DM c/b neuropathy, h/o TIA, PVD, RLS, chronic back pain and h/o prostate CA who presented to [facility] on 2024 following fall. He reports that he was walking with walker when he tripped over his own feet and became weak in both legs which caused them to \"buckle underneath\" him and subsequently fell. He reports lowering to the floor but denies hitting head and LOC. He states he has had several falls over the past week with 2-3 falls per day. Wife at bedside states that she is then unable to get patient up. He reports worsening of chronic lower back pain when lying down but is relieved when sitting up or standing. He states that he also has difficulties detecting floor due to baseline neuropathy. Denies chest pain, dizziness, shortness of breath, cough, abdominal pain, dysuria, fever and chills. Family is amenable to rehab if needed.     On ED evaluation, patient HDS, AF. Labs were grossly unremarkable. UA not suspicious for UTI. CTH with no acute findings. CT lumbar spine with advanced multi level DDD with varying degrees of canal/neural foraminal stenosis. Hospitalist service to admit for further management, PT/OT eval and possible placement (SNF vas ECF).      Review of Systems   Constitutional:  Negative for chills and fever.   Respiratory:  Negative for shortness of breath.    Cardiovascular:  Negative for chest pain.   Gastrointestinal:  Negative for abdominal pain, nausea and vomiting.   Musculoskeletal:  Positive for back pain and gait problem.   Neurological:  Positive for weakness. Negative for dizziness.   All other " systems reviewed and are negative.        Personal History     Past Medical History:   Diagnosis Date    Diabetes mellitus 2001    Type 2    Diabetic peripheral neuropathy 2/22/2022    Essential hypertension 2/22/2022    Iron deficiency anemia 7/24/2020    Mild depression 2/22/2022    Peripheral vascular disease 2/23/2022    Restless leg syndrome 2/22/2022    Type 2 diabetes mellitus with hyperglycemia 2/22/2022       No past surgical history on file.    Family History: family history includes No Known Problems in his father and mother. Otherwise pertinent FHx was reviewed and not pertinent to current issue.    Social History:  reports that he has never smoked. He does not have any smokeless tobacco history on file. He reports that he does not currently use alcohol. He reports that he does not use drugs.    Home Medications:  Prior to Admission Medications       Prescriptions Last Dose Informant Patient Reported? Taking?    Accu-Chek Guide test strip   No No    1 each by Other route 3 (Three) Times a Day Before Meals. Dx: E11.65. Use as instructed    Accu-Chek Softclix Lancets lancets   No No    1 each by Other route Daily. Use as instructed    atorvastatin (LIPITOR) 20 MG tablet   Yes No    clobetasol (TEMOVATE) 0.05 % cream   Yes No    APPLY A THIN LAYER OF CREAM TOPICALLY TO AFFECTED AREA(S) ON SKIN TWICE DAILY AS NEEDED FOR 14 DAYS AND STOP FOR A WEEK, AVOID FACE AND GROIN, REPEAT AS NEEDED    cyanocobalamin (VITAMIN B-12) 1000 MCG tablet   Yes No    Diclofenac Sodium (VOLTAREN) 1 % gel gel   Yes No    Droplet Pen Needles 31G X 8 MM misc   Yes No    DULoxetine (CYMBALTA) 60 MG capsule  Spouse/Significant Other Yes No    Take 60 mg by mouth Daily.    ferrous sulfate 325 (65 FE) MG tablet   Yes No    Fiasp FlexTouch 100 UNIT/ML solution pen-injector   Yes No    gabapentin (NEURONTIN) 600 MG tablet  Spouse/Significant Other Yes No    Take 600 mg by mouth 3 (Three) Times a Day.    Insulin Glargine (LANTUS SOLOSTAR)  100 UNIT/ML injection pen   No No    Inject 10 Units under the skin into the appropriate area as directed Every Night for 30 days.    Isopropyl Alcohol (Alcohol Wipes) 70 % misc   No No    Apply 1 each topically Daily.    ondansetron ODT (ZOFRAN-ODT) 4 MG disintegrating tablet   Yes No    pioglitazone (ACTOS) 15 MG tablet  Spouse/Significant Other Yes No    Take 15 mg by mouth Daily.    pioglitazone (ACTOS) 30 MG tablet   Yes No    rOPINIRole (REQUIP) 4 MG tablet   Yes No    Take 4 mg by mouth Daily.              Allergies:  No Known Allergies    Objective      Vitals:   Temp:  [97.9 °F (36.6 °C)] 97.9 °F (36.6 °C)  Heart Rate:  [] 96  Resp:  [20] 20  BP: (113-138)/(65-84) 128/70  Body mass index is 37.77 kg/m².    Physical Exam  General: 80 yo WM, Alert and oriented, obese, no acute distress.  HENT: Normocephalic, normal hearing, moist oral mucosa, no scleral icterus.  Neck: Supple, non-tender, no carotid bruits, no JVD, no LAD.  Lungs: Clear to auscultation, non-labored respiration.  Heart: RRR, no murmur, gallop or edema.  Abdomen: Soft, nontender, non-distended, + bowel sounds.  Musculoskeletal: No midline tenderness of L spine. Negative straight leg raise. Normal range of motion and strength, no tenderness or swelling.  Skin: Skin is warm, dry and pink, no rashes or lesions.  Psychiatric: Cooperative, appropriate mood and affect.  Neurologic: Equal strength bilaterally. No focal motor or sensory deficits appreciated. No facial droop or aphasia present.      Diagnostic Data:  Lab Results (last 24 hours)       Procedure Component Value Units Date/Time    Comprehensive Metabolic Panel [[id number]]  (Abnormal) Collected: 08/28/24 1358    Specimen: Blood Updated: 08/28/24 1500     Glucose 155 mg/dL      BUN 19 mg/dL      Creatinine 1.08 mg/dL      Sodium 139 mmol/L      Potassium 4.5 mmol/L      Comment: Slight hemolysis detected by analyzer. Result may be falsely elevated.        Chloride 100 mmol/L      CO2  29.3 mmol/L      Calcium 9.5 mg/dL      Total Protein 7.4 g/dL      Albumin 3.9 g/dL      ALT (SGPT) 14 U/L      AST (SGOT) 31 U/L      Comment: Slight hemolysis detected by analyzer. Result may be falsely elevated.        Alkaline Phosphatase 93 U/L      Total Bilirubin 0.7 mg/dL      Globulin 3.5 gm/dL      A/G Ratio 1.1 g/dL      BUN/Creatinine Ratio 17.6     Anion Gap 9.7 mmol/L      eGFR 69.8 mL/min/1.73     Narrative:      GFR Normal >60  Chronic Kidney Disease <60  Kidney Failure <15    The GFR formula is only valid for adults with stable renal function between ages 18 and 70.    Magnesium [[id number]]  (Normal) Collected: 08/28/24 1358    Specimen: Blood Updated: 08/28/24 1500     Magnesium 1.9 mg/dL     Single High Sensitivity Troponin T [[id number]]  (Normal) Collected: 08/28/24 1358    Specimen: Blood Updated: 08/28/24 1448     HS Troponin T 14 ng/L     Narrative:      High Sensitive Troponin T Reference Range:  <14.0 ng/L- Negative Female for AMI  <22.0 ng/L- Negative Male for AMI  >=14 - Abnormal Female indicating possible myocardial injury.  >=22 - Abnormal Male indicating possible myocardial injury.   Clinicians would have to utilize clinical acumen, EKG, Troponin, and serial changes to determine if it is an Acute Myocardial Infarction or myocardial injury due to an underlying chronic condition.         CK [[id number]]  (Normal) Collected: 08/28/24 1358    Specimen: Blood Updated: 08/28/24 1448     Creatine Kinase 55 U/L     TSH [[id number]]  (Normal) Collected: 08/28/24 1358    Specimen: Blood Updated: 08/28/24 1448     TSH 3.740 uIU/mL     Ammonia [[id number]]  (Normal) Collected: 08/28/24 1358    Specimen: Blood Updated: 08/28/24 1425     Ammonia 19 umol/L     Urinalysis, Microscopic Only - Urine, Clean Catch [[id number]] Collected: 08/28/24 1401    Specimen: Urine, Clean Catch Updated: 08/28/24 1414     RBC, UA 0-2 /HPF      WBC, UA 0-2 /HPF      Comment: Urine culture not indicated.        Bacteria, UA  None Seen /HPF      Squamous Epithelial Cells, UA 0-2 /HPF      Hyaline Casts, UA None Seen /LPF      Methodology Automated Microscopy    Urinalysis With Culture If Indicated - Urine, Clean Catch [[id number]]  (Abnormal) Collected: 08/28/24 1401    Specimen: Urine, Clean Catch Updated: 08/28/24 1412     Color, UA Dark Yellow     Appearance, UA Clear     pH, UA <=5.0     Specific Gravity, UA 1.022     Glucose, UA Negative     Ketones, UA Trace     Bilirubin, UA Negative     Blood, UA Negative     Protein, UA Negative     Leuk Esterase, UA Trace     Nitrite, UA Negative     Urobilinogen, UA 1.0 E.U./dL    Narrative:      In absence of clinical symptoms, the presence of pyuria, bacteria, and/or nitrites on the urinalysis result does not correlate with infection.    CBC & Differential [[id number]]  (Abnormal) Collected: 08/28/24 1358    Specimen: Blood Updated: 08/28/24 1410    Narrative:      The following orders were created for panel order CBC & Differential.  Procedure                               Abnormality         Status                     ---------                               -----------         ------                     CBC Auto Differential[[id number]]        Abnormal            Final result                 Please view results for these tests on the individual orders.    CBC Auto Differential [[id number]]  (Abnormal) Collected: 08/28/24 1358    Specimen: Blood Updated: 08/28/24 1410     WBC 9.05 10*3/mm3      RBC 3.82 10*6/mm3      Hemoglobin 12.0 g/dL      Hematocrit 37.6 %      MCV 98.4 fL      MCH 31.4 pg      MCHC 31.9 g/dL      RDW 14.2 %      RDW-SD 51.3 fl      MPV 11.0 fL      Platelets 175 10*3/mm3      Neutrophil % 57.7 %      Lymphocyte % 27.3 %      Monocyte % 11.5 %      Eosinophil % 2.7 %      Basophil % 0.6 %      Immature Grans % 0.2 %      Neutrophils, Absolute 5.23 10*3/mm3      Lymphocytes, Absolute 2.47 10*3/mm3      Monocytes, Absolute 1.04 10*3/mm3      Eosinophils, Absolute 0.24  10*3/mm3      Basophils, Absolute 0.05 10*3/mm3      Immature Grans, Absolute 0.02 10*3/mm3      nRBC 0.0 /100 WBC              Imaging Results (Last 24 Hours)       Procedure Component Value Units Date/Time    CT Lumbar Spine Without Contrast [[id number]] Collected: 08/28/24 1402     Updated: 08/28/24 1410    Narrative:      CT LUMBAR SPINE WO CONTRAST    Date of Exam: 8/28/2024 1:39 PM EDT    Indication: Midline low back pain after fall.    Comparison: CTA AIF 7/5/2023    Technique: Axial CT images were obtained of the lumbar spine without contrast administration.  Sagittal and coronal reconstructions were performed.  Automated exposure control and iterative reconstruction methods were used.      Findings:  12 degrees lumbar dextroscoliosis. Advanced diminished disc height eccentrically on the right at T12-L1 and L4-5. Advanced diminished disc height eccentrically on the left at L1-2, L2-3, L3-4. Multilevel anterolateral endplate marginal osteophyte   formation. No fracture is seen.    4 mm retrolisthesis L3 upon L4, 4 mm retrolisthesis L4 upon L5, unchanged from 7/5/2023. Chronic bilateral L5 spondylitic defects are again noted.    Uncomplicated cholelithiasis. Remainder of the imaged paraspinal soft tissues are within normal limits.      At T12-L1, mild posterior osteophyte formation. Moderate right facet arthropathy. No significant canal stenosis. Left neural foramen is patent. Mild right neural foraminal narrowing.    At L1-2, broad-based posterior disc osteophyte formation. Mild to moderate central canal stenosis. Moderate bilateral neural foraminal stenosis, greatest on the right.    At L2-3, broad-based posterior disc osteophyte formation. Mild bilateral facet arthropathy. Moderate to severe central canal stenosis. Mild right neural foraminal narrowing. Moderate to severe left neural foraminal stenosis    At L3-4, broad-based posterior disc osteophyte formation with mild to moderate central canal stenosis.  Mild bilateral facet arthropathy. Severe left neural foraminal stenosis. Mild right neural foraminal stenosis    At L4-5, broad-based posterior disc osteophyte formation is present. Moderate bilateral facet arthropathy. Mild canal stenosis. Severe right neural foraminal stenosis. Moderate to severe left neural foraminal stenosis.    At L5-S1, no significant disc bulge or canal stenosis. Mild bilateral facet arthropathy. Grade 1 anterolisthesis L5 upon S1 secondary to chronic pars defects. Severe bilateral neural foraminal stenosis.                Impression:      Impression:    1. Advanced degenerative changes in the lumbar spine.  2. No acute lumbar spine findings.  3. Please refer to the body the report for level by level findings        Electronically Signed: Caterina Delatorre MD    8/28/2024 2:08 PM EDT    Workstation ID: [id number]    CT Head Without Contrast [[id number]] Collected: 08/28/24 1358     Updated: 08/28/24 1402    Narrative:      CT HEAD WO CONTRAST    Date of Exam: 8/28/2024 1:39 PM EDT    Indication: Increasing weakness fell today.    Comparison: 2/22/2022    Technique: Axial CT images were obtained of the head without contrast administration.  Coronal reconstructions were performed.  Automated exposure control and iterative reconstruction methods were used.      Findings:  There is mild atrophy. Ventricular dilatation is compatible with atrophy and is stable. There is no acute mass effect or edema. There are no findings suspicious for acute CVA or hemorrhage. There is fluid in the sphenoid sinuses. The other visualized   paranasal sinuses appear clear. There is no evidence of a skull fracture.      Impression:      Impression:  No acute intracranial process. Evidence of acute sphenoid sinusitis.      Electronically Signed: Taniya Rodriguez MD    8/28/2024 2:00 PM EDT    Workstation ID: [id number]              Assessment & Plan        This is a 79 y.o. male with:    Active and Resolved Problems  Active  Hospital Problems    Diagnosis  POA    **Back pain [M54.9]  Yes      Resolved Hospital Problems   No resolved problems to display.     Generalized weakness/Deconditioning  Acute on chronic back pain   Recurrent falls  CTH, CT L spine with multi level DDD and varying degrees of canal/foraminal narrowing  Labs unremarkable. No leukocytosis  UA with no UTI  Prn pain medication   PT/OT to eval, will likely need SNF placement   Fall precautions  No need for neurosurgery consult, no acute radiculopathy symptoms    Diabetes Mellitus c/b neuropathy and retinopathy    Mod SSI   Consistent carb diet  Hypoglycemia protocol     Hyperlipidemia  Continue statin    H/o TIA  No residual deficits noted  Continue to monitor     RLS  Continue Requip    H/o prostate CA  S/p radiation   C/b urinary incontinence        VTE Prophylaxis:  Mechanical VTE prophylaxis orders are present.        The patient desires to be as follows:    CODE STATUS:    Code Status (Patient has no pulse and is not breathing): CPR (Attempt to Resuscitate)  Medical Interventions (Patient has pulse or is breathing): Full Support        Maxine Oates, who can be contacted at [phone], is the designated person to make medical decisions on the patient's behalf if He is incapable of doing so. This was clarified with patient and/or next of kin on 8/28/2024 during the course of this H&P.    Admission Status:  I believe this patient meets observation status.    Expected Length of Stay: < 24 hours    PDMP and Medication Dispenses via Sidebar reviewed and consistent with patient reported medications.    I discussed the patient's findings and my recommendations with patient.      Signature:     This document has been electronically signed by Sahra Yu PA-C on August 28, 2024 16:57 EDT   [facility] Hospitalist Team

## 2024-08-28 NOTE — Clinical Note
Level of Care: Med/Surg [1]   Diagnosis: Back pain [420664]   Admitting Physician: NIVIA JIMENEZ [[id number]]   Attending Physician: NIVIA JIMENEZ [[id number]]

## 2024-08-29 LAB
ANION GAP SERPL CALCULATED.3IONS-SCNC: 10 MMOL/L (ref 5–15)
BASOPHILS # BLD AUTO: 0.04 10*3/MM3 (ref 0–0.2)
BASOPHILS NFR BLD AUTO: 0.5 % (ref 0–1.5)
BUN SERPL-MCNC: 15 MG/DL (ref 8–23)
BUN/CREAT SERPL: 14.7 (ref 7–25)
CALCIUM SPEC-SCNC: 9.1 MG/DL (ref 8.6–10.5)
CHLORIDE SERPL-SCNC: 101 MMOL/L (ref 98–107)
CO2 SERPL-SCNC: 29 MMOL/L (ref 22–29)
CREAT SERPL-MCNC: 1.02 MG/DL (ref 0.76–1.27)
DEPRECATED RDW RBC AUTO: 51.2 FL (ref 37–54)
EGFRCR SERPLBLD CKD-EPI 2021: 74.8 ML/MIN/1.73
EOSINOPHIL # BLD AUTO: 0.28 10*3/MM3 (ref 0–0.4)
EOSINOPHIL NFR BLD AUTO: 3.3 % (ref 0.3–6.2)
ERYTHROCYTE [DISTWIDTH] IN BLOOD BY AUTOMATED COUNT: 13.9 % (ref 12.3–15.4)
GLUCOSE BLDC GLUCOMTR-MCNC: 144 MG/DL (ref 70–105)
GLUCOSE BLDC GLUCOMTR-MCNC: 201 MG/DL (ref 70–105)
GLUCOSE BLDC GLUCOMTR-MCNC: 220 MG/DL (ref 70–105)
GLUCOSE BLDC GLUCOMTR-MCNC: 243 MG/DL (ref 70–105)
GLUCOSE BLDC GLUCOMTR-MCNC: 280 MG/DL (ref 70–105)
GLUCOSE SERPL-MCNC: 163 MG/DL (ref 65–99)
HCT VFR BLD AUTO: 39.5 % (ref 37.5–51)
HGB BLD-MCNC: 12.3 G/DL (ref 13–17.7)
IMM GRANULOCYTES # BLD AUTO: 0.02 10*3/MM3 (ref 0–0.05)
IMM GRANULOCYTES NFR BLD AUTO: 0.2 % (ref 0–0.5)
LYMPHOCYTES # BLD AUTO: 1.6 10*3/MM3 (ref 0.7–3.1)
LYMPHOCYTES NFR BLD AUTO: 19 % (ref 19.6–45.3)
MAGNESIUM SERPL-MCNC: 2 MG/DL (ref 1.6–2.4)
MCH RBC QN AUTO: 31.1 PG (ref 26.6–33)
MCHC RBC AUTO-ENTMCNC: 31.1 G/DL (ref 31.5–35.7)
MCV RBC AUTO: 99.7 FL (ref 79–97)
MONOCYTES # BLD AUTO: 0.72 10*3/MM3 (ref 0.1–0.9)
MONOCYTES NFR BLD AUTO: 8.5 % (ref 5–12)
NEUTROPHILS NFR BLD AUTO: 5.77 10*3/MM3 (ref 1.7–7)
NEUTROPHILS NFR BLD AUTO: 68.5 % (ref 42.7–76)
NRBC BLD AUTO-RTO: 0 /100 WBC (ref 0–0.2)
PLATELET # BLD AUTO: 158 10*3/MM3 (ref 140–450)
PMV BLD AUTO: 10.7 FL (ref 6–12)
POTASSIUM SERPL-SCNC: 4.5 MMOL/L (ref 3.5–5.2)
RBC # BLD AUTO: 3.96 10*6/MM3 (ref 4.14–5.8)
SODIUM SERPL-SCNC: 140 MMOL/L (ref 136–145)
WBC NRBC COR # BLD AUTO: 8.43 10*3/MM3 (ref 3.4–10.8)

## 2024-08-29 PROCEDURE — 97162 PT EVAL MOD COMPLEX 30 MIN: CPT

## 2024-08-29 PROCEDURE — 82948 REAGENT STRIP/BLOOD GLUCOSE: CPT

## 2024-08-29 PROCEDURE — 63710000001 INSULIN LISPRO (HUMAN) PER 5 UNITS

## 2024-08-29 PROCEDURE — 97165 OT EVAL LOW COMPLEX 30 MIN: CPT

## 2024-08-29 PROCEDURE — 83735 ASSAY OF MAGNESIUM: CPT

## 2024-08-29 PROCEDURE — 25010000002 CEFTRIAXONE PER 250 MG: Performed by: EMERGENCY MEDICINE

## 2024-08-29 PROCEDURE — 85025 COMPLETE CBC W/AUTO DIFF WBC: CPT

## 2024-08-29 PROCEDURE — 80048 BASIC METABOLIC PNL TOTAL CA: CPT

## 2024-08-29 RX ORDER — GABAPENTIN 300 MG/1
600 CAPSULE ORAL EVERY 12 HOURS SCHEDULED
Status: DISCONTINUED | OUTPATIENT
Start: 2024-08-29 | End: 2024-09-01 | Stop reason: HOSPADM

## 2024-08-29 RX ORDER — ATORVASTATIN CALCIUM 20 MG/1
20 TABLET, FILM COATED ORAL NIGHTLY
Status: DISCONTINUED | OUTPATIENT
Start: 2024-08-29 | End: 2024-09-01 | Stop reason: HOSPADM

## 2024-08-29 RX ORDER — MULTIPLE VITAMINS W/ MINERALS TAB 9MG-400MCG
1 TAB ORAL DAILY
Status: DISCONTINUED | OUTPATIENT
Start: 2024-08-29 | End: 2024-09-01 | Stop reason: HOSPADM

## 2024-08-29 RX ADMIN — Medication 1 TABLET: at 16:29

## 2024-08-29 RX ADMIN — ATORVASTATIN CALCIUM 20 MG: 20 TABLET, FILM COATED ORAL at 20:30

## 2024-08-29 RX ADMIN — GABAPENTIN 600 MG: 300 CAPSULE ORAL at 20:30

## 2024-08-29 RX ADMIN — INSULIN LISPRO 4 UNITS: 100 INJECTION, SOLUTION INTRAVENOUS; SUBCUTANEOUS at 20:30

## 2024-08-29 RX ADMIN — CEFTRIAXONE 2000 MG: 2 INJECTION, POWDER, FOR SOLUTION INTRAMUSCULAR; INTRAVENOUS at 16:30

## 2024-08-29 RX ADMIN — INSULIN LISPRO 6 UNITS: 100 INJECTION, SOLUTION INTRAVENOUS; SUBCUTANEOUS at 16:29

## 2024-08-29 RX ADMIN — INSULIN LISPRO 4 UNITS: 100 INJECTION, SOLUTION INTRAVENOUS; SUBCUTANEOUS at 12:04

## 2024-08-29 NOTE — ACP (ADVANCE CARE PLANNING)
Patient reports having no ACP needs. He thinks maybe his wife made the request. Patient reports to have no living will/AD/POA. Patient reports that NOK would be his wife. Her contact info is in the patient's chart.     Will close out consult.    henry Beebe

## 2024-08-29 NOTE — CONSULTS
"Diabetes Education  Assessment/Teaching    Patient Name:  Ignacio Miranda  YOB: 1945  MRN: [id number]  Admit Date:  8/28/2024      Assessment Date:  8/29/2024  Flowsheet Row Most Recent Value   General Information     Referral From: MD order  [Consult received to teach bs monitoring. Adm bs 155. Most recent A1c result from 2/22/2022 was 9.7%.]   Height 175.3 cm (69\")   Weight 115 kg (254 lb 6.6 oz)   Weight Method Bed scale   Pregnancy Assessment    Diabetes History    What type of diabetes do you have? Type 2   Length of Diabetes Diagnosis --  [Pt with long hx of diabetes.]   Do you test your blood sugar at home? yes   Frequency of checks pt had been wearing the continuous glucose sensors but they have moved and wife unable to find the sensors   Meter type Freestyle [place], wife states pt does not have bs meter at home   Who performs the test? wife performs   Typical readings 200-300s   Have you had low blood sugar? (<70mg/dl) yes   How often do you have low blood sugar? occasionally   Education Preferences    Nutrition Information    Assessment Topics    DM Goals             Flowsheet Row Most Recent Value   DM Education Needs    Meter --  [Wife states it is not time to reorder the glucose sensors so she is requesting bs meter/supplies be ordered for pt. Educator will meet with wife later today to instruct on bs meter.]   Meter Type Accuchek   Frequency of Testing 3 times a day  [Discussed with wife checking bs ac and hs daily. Pt eating brunch and supper daily. Wife agreeable to checking bs 3 times/day until they can receive more glucose sensors.]   Blood Glucose Target Range Discussed healthy bs range and healthy A1c target. Discussed importance of bs control.   Medication Insulin, Pen  [Pt taking Toujeo 80 units hs and Fiasp 30 units am and 40 units presupper. Wife preparing and giving injections. Wife states she and pt sometimes forget to give the premeal insulin.]   Problem Solving Hypoglycemia, " "Hyperglycemia, Signs, Symptoms, Treatment  [Discussed importance of always keeping low bs tx available.]   Reducing Risks A1C testing   Healthy Eating --  [Pt usually eating 2 meals/day.]   Motivation Engaged   Teaching Method Explanation, Discussion   Patient Response Verbalized understanding              Other Comments:  Met with pt at bedside. Attempted completing diabetes assessment with pt. Pt states, \"My wife takes care of my diabetes.\" Pt requesting educator contact wife to complete assessment/education. Pt follows at endocrinologist, Dr. Fernandez Harrell. Pt saw MD last on 5/31/2024. Insulin adjusted at that time. Discussed with wife setting alarm on phone or putting reminder in house to remind to give the mealtime insulin injections. Discussed this would help with bs control. Wife states she and pt recently moved and having trouble finding sensor supplies and insurance will not pay for more sensors to be ordered at this time.    Rxs started for Accuchek Guide Me meter, test strips and lancets. Wife will be at bedside later today. Educator to attempt follow up with wife for bs meter instruction.       Electronically signed by:  Carly Rolon RN  08/29/24 11:24 EDT  "

## 2024-08-29 NOTE — PLAN OF CARE
Problem: Adult Inpatient Plan of Care  Goal: Plan of Care Review  Outcome: Ongoing, Progressing  Flowsheets (Taken 8/29/2024 1612)  Plan of Care Reviewed With: patient  Goal: Patient-Specific Goal (Individualized)  Outcome: Ongoing, Progressing  Goal: Absence of Hospital-Acquired Illness or Injury  Outcome: Ongoing, Progressing  Intervention: Identify and Manage Fall Risk  Recent Flowsheet Documentation  Taken 8/29/2024 1200 by Bri Awad RN  Safety Promotion/Fall Prevention:   activity supervised   assistive device/personal items within reach   fall prevention program maintained   gait belt   safety round/check completed   nonskid shoes/slippers when out of bed  Taken 8/29/2024 1000 by Bri Awad RN  Safety Promotion/Fall Prevention: assistive device/personal items within reach  Taken 8/29/2024 0800 by Bri Awad RN  Safety Promotion/Fall Prevention:   assistive device/personal items within reach   activity supervised   fall prevention program maintained   gait belt   nonskid shoes/slippers when out of bed   safety round/check completed  Intervention: Prevent Skin Injury  Recent Flowsheet Documentation  Taken 8/29/2024 1200 by Bri Awad RN  Body Position: weight shifting  Taken 8/29/2024 0800 by Bri Awad RN  Body Position: weight shifting  Skin Protection: adhesive use limited  Intervention: Prevent and Manage VTE (Venous Thromboembolism) Risk  Recent Flowsheet Documentation  Taken 8/29/2024 1200 by Bri Awad RN  Activity Management: up in chair  VTE Prevention/Management:   bilateral   compression stockings off  Range of Motion: active ROM (range of motion) encouraged  Taken 8/29/2024 0800 by Bri Awad RN  VTE Prevention/Management:   bilateral   compression stockings on  Intervention: Prevent Infection  Recent Flowsheet Documentation  Taken 8/29/2024 1200 by Bri Awad RN  Infection Prevention: equipment surfaces disinfected  Taken 8/29/2024 1000 by Bri Awad  RN  Infection Prevention: equipment surfaces disinfected  Taken 8/29/2024 0800 by Bri Awad RN  Infection Prevention: equipment surfaces disinfected  Goal: Optimal Comfort and Wellbeing  Outcome: Ongoing, Progressing  Intervention: Provide Person-Centered Care  Recent Flowsheet Documentation  Taken 8/29/2024 1200 by Bri Awad RN  Trust Relationship/Rapport:   care explained   choices provided  Taken 8/29/2024 0800 by Bri Awad RN  Trust Relationship/Rapport:   care explained   choices provided  Goal: Readiness for Transition of Care  Outcome: Ongoing, Progressing     Problem: Skin Injury Risk Increased  Goal: Skin Health and Integrity  Outcome: Ongoing, Progressing  Intervention: Optimize Skin Protection  Recent Flowsheet Documentation  Taken 8/29/2024 0800 by Bri Awad RN  Pressure Reduction Techniques: frequent weight shift encouraged  Head of Bed (HOB) Positioning: HOB elevated  Pressure Reduction Devices: pressure-redistributing mattress utilized  Skin Protection: adhesive use limited     Problem: Fall Injury Risk  Goal: Absence of Fall and Fall-Related Injury  Outcome: Ongoing, Progressing  Intervention: Identify and Manage Contributors  Recent Flowsheet Documentation  Taken 8/29/2024 1200 by Bri Awad RN  Medication Review/Management: medications reviewed  Taken 8/29/2024 1000 by Bri Awad RN  Medication Review/Management: medications reviewed  Taken 8/29/2024 0800 by Bri Awad RN  Medication Review/Management: medications reviewed  Intervention: Promote Injury-Free Environment  Recent Flowsheet Documentation  Taken 8/29/2024 1200 by Bri Awad RN  Safety Promotion/Fall Prevention:   activity supervised   assistive device/personal items within reach   fall prevention program maintained   gait belt   safety round/check completed   nonskid shoes/slippers when out of bed  Taken 8/29/2024 1000 by Bri Awad RN  Safety Promotion/Fall Prevention: assistive device/personal  items within reach  Taken 8/29/2024 0800 by Bri Awad, RN  Safety Promotion/Fall Prevention:   assistive device/personal items within reach   activity supervised   fall prevention program maintained   gait belt   nonskid shoes/slippers when out of bed   safety round/check completed   Goal Outcome Evaluation:  Plan of Care Reviewed With: patient

## 2024-08-29 NOTE — PLAN OF CARE
"Goal Outcome Evaluation:  Plan of Care Reviewed With: patient           Outcome Evaluation: Ignacio Miranda is a 79 y.o. male with a CMH of T2DM c/b neuropathy, h/o TIA, PVD, RLS, chronic back pain and h/o prostate CA who presented to [facility] on 8/28/2024 following fall. He reports that he was walking with walker when he tripped over his own feet and became weak in both legs which caused them to \"buckle underneath\" him and subsequently fell. He reports lowering to the floor but denies hitting head and LOC. He states he has had several falls over the past week with 2-3 falls per day.   CTH with no acute findings. CT lumbar spine with advanced multi level DDD with varying.  He is cleared for PT evaluation by nursing and the patient himself is agreeable and verbose througout.  In his normal state he utilizes a rollator for all mobility and required assistance for stairs and ADLs from family.  He resides with his spouse in a [facility] with 3 PINA.  Upon evaluation he is found to have significant impairements with BLE strength, sensation, and proprioception all of which likely contribute to balance and mobility deficits.  He requires Genaro for transitions and mobiltiy using the RW.  He is below his fucntional baseline and would not be safe to return to his home environment at this time, he would greatly benefit for SNF level of care for ongoing rehabilitation and nurisng care to improve balance and mobility and he is agreeable.  Will continue to follow while IP for strengthening, balance, and progressive mobility training to improve fucntion, reduce falls risk, and reduce secondary complications from immobility.      Anticipated Discharge Disposition (PT): skilled nursing facility                        "

## 2024-08-29 NOTE — PLAN OF CARE
"Goal Outcome Evaluation:  Plan of Care Reviewed With: patient        Progress: no change  Outcome Evaluation: Pt. is a 79 y.o. male with a CMH of T2DM c/b neuropathy, h/o TIA, PVD, RLS, chronic back pain and h/o prostate CA who presented to [facility] on 8/28/2024 following fall. He reports that he was walking with walker when he tripped over his own feet and became weak in both legs which caused them to \"buckle underneath\" him and subsequently fell. Imaging (-) acute process. Pt. states he lives at home w/ spouse who helps him w/ dressing/bathing ADLs, also cites incontinence at night. Pt. reveals that spouse also having difficulty at home. Pt. requires min A for functional transfers this date, ambulating w/ rolling walker support limited secondary to knee pain. Pt. provided max A for all LB ADLs. Not safe for d/c home at this time and will require SNF placement to address aforementioned deficits.      Anticipated Discharge Disposition (OT): skilled nursing facility                        "

## 2024-08-29 NOTE — PLAN OF CARE
Problem: Adult Inpatient Plan of Care  Goal: Plan of Care Review  Outcome: Ongoing, Progressing  Flowsheets (Taken 8/29/2024 0503)  Plan of Care Reviewed With: patient  Goal: Patient-Specific Goal (Individualized)  Outcome: Ongoing, Progressing  Goal: Absence of Hospital-Acquired Illness or Injury  Outcome: Ongoing, Progressing  Intervention: Identify and Manage Fall Risk  Recent Flowsheet Documentation  Taken 8/29/2024 0330 by Katiuska Fox, RN  Safety Promotion/Fall Prevention:   assistive device/personal items within reach   clutter free environment maintained   fall prevention program maintained   lighting adjusted   mobility aid in reach   nonskid shoes/slippers when out of bed   room organization consistent   safety round/check completed  Taken 8/29/2024 0200 by Katiuska Fox RN  Safety Promotion/Fall Prevention:   assistive device/personal items within reach   clutter free environment maintained   fall prevention program maintained   lighting adjusted   mobility aid in reach   nonskid shoes/slippers when out of bed   room organization consistent   safety round/check completed  Taken 8/29/2024 0010 by Katiuska Fox, RN  Safety Promotion/Fall Prevention:   assistive device/personal items within reach   clutter free environment maintained   fall prevention program maintained   lighting adjusted   mobility aid in reach   nonskid shoes/slippers when out of bed   room organization consistent   safety round/check completed  Taken 8/28/2024 2200 by Katiuska Fox, RN  Safety Promotion/Fall Prevention:   assistive device/personal items within reach   clutter free environment maintained   fall prevention program maintained   lighting adjusted   mobility aid in reach   nonskid shoes/slippers when out of bed   room organization consistent   safety round/check completed  Taken 8/28/2024 1930 by Katiuska Fox, RN  Safety Promotion/Fall Prevention:   assistive device/personal items within reach   clutter free environment  maintained   fall prevention program maintained   lighting adjusted   mobility aid in reach   nonskid shoes/slippers when out of bed   room organization consistent   safety round/check completed  Intervention: Prevent Skin Injury  Recent Flowsheet Documentation  Taken 8/29/2024 0330 by Katiuska Fox RN  Body Position: weight shifting  Skin Protection:   adhesive use limited   tubing/devices free from skin contact   transparent dressing maintained  Taken 8/29/2024 0200 by Katiuska Fox RN  Body Position: weight shifting  Taken 8/29/2024 0010 by Katiuska Fox RN  Skin Protection:   adhesive use limited   transparent dressing maintained   tubing/devices free from skin contact  Taken 8/28/2024 1930 by Katiuska Fox RN  Body Position: weight shifting  Skin Protection:   adhesive use limited   transparent dressing maintained   tubing/devices free from skin contact  Intervention: Prevent and Manage VTE (Venous Thromboembolism) Risk  Recent Flowsheet Documentation  Taken 8/29/2024 0330 by Katiuska Fox RN  Range of Motion: active ROM (range of motion) encouraged  Taken 8/29/2024 0010 by Katiuska Fox RN  Range of Motion: active ROM (range of motion) encouraged  Taken 8/28/2024 1930 by Katiuska Fox RN  VTE Prevention/Management:   bilateral   sequential compression devices on  Range of Motion: active ROM (range of motion) encouraged  Intervention: Prevent Infection  Recent Flowsheet Documentation  Taken 8/29/2024 0330 by Katiuska Fox RN  Infection Prevention:   equipment surfaces disinfected   hand hygiene promoted   personal protective equipment utilized   rest/sleep promoted   single patient room provided  Taken 8/29/2024 0200 by Katiuska Fox RN  Infection Prevention:   equipment surfaces disinfected   hand hygiene promoted   personal protective equipment utilized   rest/sleep promoted   single patient room provided  Taken 8/29/2024 0010 by Katiuska Fox RN  Infection Prevention:   equipment surfaces  disinfected   hand hygiene promoted   personal protective equipment utilized   rest/sleep promoted   single patient room provided  Taken 8/28/2024 2200 by Katiuska Fox RN  Infection Prevention:   equipment surfaces disinfected   hand hygiene promoted   personal protective equipment utilized   rest/sleep promoted   single patient room provided  Taken 8/28/2024 1930 by Katiuska Fox RN  Infection Prevention:   equipment surfaces disinfected   hand hygiene promoted   personal protective equipment utilized   rest/sleep promoted   single patient room provided  Goal: Optimal Comfort and Wellbeing  Outcome: Ongoing, Progressing  Intervention: Provide Person-Centered Care  Recent Flowsheet Documentation  Taken 8/28/2024 1930 by Katiuska Fox RN  Trust Relationship/Rapport:   care explained   choices provided  Goal: Readiness for Transition of Care  Outcome: Ongoing, Progressing  Intervention: Mutually Develop Transition Plan  Recent Flowsheet Documentation  Taken 8/28/2024 1930 by Katiuska Fox RN  Equipment Currently Used at Home:   rollator   grab bar   glucometer   bath bench  Taken 8/28/2024 1927 by Katiuska Fox RN  Transportation Anticipated:   health plan transportation   family or friend will provide  Patient/Family Anticipated Services at Transition:   Patient/Family Anticipates Transition to: home with family     Problem: Skin Injury Risk Increased  Goal: Skin Health and Integrity  Outcome: Ongoing, Progressing  Intervention: Optimize Skin Protection  Recent Flowsheet Documentation  Taken 8/29/2024 0330 by Katiuska Fox RN  Pressure Reduction Techniques:   frequent weight shift encouraged   weight shift assistance provided  Pressure Reduction Devices: pressure-redistributing mattress utilized  Skin Protection:   adhesive use limited   tubing/devices free from skin contact   transparent dressing maintained  Taken 8/29/2024 0010 by Katiuska Fox, RN  Pressure Reduction Techniques:   frequent  weight shift encouraged   weight shift assistance provided  Pressure Reduction Devices: pressure-redistributing mattress utilized  Skin Protection:   adhesive use limited   transparent dressing maintained   tubing/devices free from skin contact  Taken 8/28/2024 1930 by Katiuska Fox RN  Head of Bed (HOB) Positioning: HOB elevated  Skin Protection:   adhesive use limited   transparent dressing maintained   tubing/devices free from skin contact     Problem: Fall Injury Risk  Goal: Absence of Fall and Fall-Related Injury  Outcome: Ongoing, Progressing  Intervention: Identify and Manage Contributors  Recent Flowsheet Documentation  Taken 8/29/2024 0330 by Katiuska Fox RN  Medication Review/Management: medications reviewed  Taken 8/29/2024 0200 by Katiuska Fox RN  Medication Review/Management: medications reviewed  Taken 8/29/2024 0010 by Katiuska Fox RN  Medication Review/Management: medications reviewed  Taken 8/28/2024 2200 by Katiuska Fox RN  Medication Review/Management: medications reviewed  Taken 8/28/2024 1930 by Katiuska Fox RN  Medication Review/Management: medications reviewed  Intervention: Promote Injury-Free Environment  Recent Flowsheet Documentation  Taken 8/29/2024 0330 by Katiuska Fox RN  Safety Promotion/Fall Prevention:   assistive device/personal items within reach   clutter free environment maintained   fall prevention program maintained   lighting adjusted   mobility aid in reach   nonskid shoes/slippers when out of bed   room organization consistent   safety round/check completed  Taken 8/29/2024 0200 by Katiuska Fox RN  Safety Promotion/Fall Prevention:   assistive device/personal items within reach   clutter free environment maintained   fall prevention program maintained   lighting adjusted   mobility aid in reach   nonskid shoes/slippers when out of bed   room organization consistent   safety round/check completed  Taken 8/29/2024 0010 by Katiuska Fox RN  Safety Promotion/Fall  Prevention:   assistive device/personal items within reach   clutter free environment maintained   fall prevention program maintained   lighting adjusted   mobility aid in reach   nonskid shoes/slippers when out of bed   room organization consistent   safety round/check completed  Taken 8/28/2024 2200 by Katiuska Fox, RN  Safety Promotion/Fall Prevention:   assistive device/personal items within reach   clutter free environment maintained   fall prevention program maintained   lighting adjusted   mobility aid in reach   nonskid shoes/slippers when out of bed   room organization consistent   safety round/check completed  Taken 8/28/2024 1930 by Katiuska Fox, RN  Safety Promotion/Fall Prevention:   assistive device/personal items within reach   clutter free environment maintained   fall prevention program maintained   lighting adjusted   mobility aid in reach   nonskid shoes/slippers when out of bed   room organization consistent   safety round/check completed   Goal Outcome Evaluation:  Plan of Care Reviewed With: patient

## 2024-08-29 NOTE — THERAPY EVALUATION
Patient Name: Ignacio Miranda  : 1945    MRN: [id number]                              Today's Date: 2024       Admit Date: 2024    Visit Dx:     ICD-10-CM ICD-9-CM   1. Near syncope  R55 780.2   2. Weakness  R53.1 780.79   3. Ataxia  R27.0 781.3   4. Acute bilateral low back pain without sciatica  M54.50 724.2     338.19   5. Acute non-recurrent sphenoidal sinusitis  J01.30 461.3   6. Lumbar degenerative disc disease  M51.36 722.52   7. Spondylosis of lumbar region without myelopathy or radiculopathy  M47.816 721.3   8. Central stenosis of spinal canal  M48.00 724.00   9. Neuroforaminal stenosis of lumbosacral spine  M48.07 724.03   10. Type 2 diabetes mellitus with diabetic polyneuropathy, unspecified whether long term insulin use  E11.42 250.60     357.2     Patient Active Problem List   Diagnosis    Iron deficiency anemia    Normocytic anemia    Essential hypertension    Type 2 diabetes mellitus with hyperglycemia    Diabetic peripheral neuropathy    Mild depression    Restless leg syndrome    Peripheral vascular disease    Back pain    Near syncope     Past Medical History:   Diagnosis Date    Diabetes mellitus     Type 2    Diabetic peripheral neuropathy 2022    Essential hypertension 2022    Iron deficiency anemia 2020    Mild depression 2022    Peripheral vascular disease 2022    Restless leg syndrome 2022    Type 2 diabetes mellitus with hyperglycemia 2022     History reviewed. No pertinent surgical history.   General Information       Row Name 24 1403          Physical Therapy Time and Intention    Document Type evaluation  -RR     Mode of Treatment physical therapy  -RR       Row Name 24 1403          General Information    Patient Profile Reviewed yes  -RR     Prior Level of Function independent:;all household mobility;gait  utilized Rollator for all mobility, - driving, frequent falls due to LE giving out; wife assisted with ADLs  -RR      Existing Precautions/Restrictions fall  -RR     Barriers to Rehab physical barrier  -RR       Row Name 08/29/24 1403          Living Environment    People in Home spouse  -RR       Row Name 08/29/24 1403          Home Main Entrance    Number of Stairs, Main Entrance three  required assist to navigate at baseline  -RR     Stair Railings, Main Entrance railings safe and in good condition  -RR       Row Name 08/29/24 1403          Stairs Within Home, Primary    Number of Stairs, Within Home, Primary none  -RR       Row Name 08/29/24 1403          Cognition    Orientation Status (Cognition) oriented x 4  -RR       Row Name 08/29/24 1403          Safety Issues, Functional Mobility    Safety Issues Affecting Function (Mobility) awareness of need for assistance;insight into deficits/self-awareness  -RR     Impairments Affecting Function (Mobility) balance;endurance/activity tolerance;strength;sensation/sensory awareness  marked impairment to BLE sensation and proprioception  -RR               User Key  (r) = Recorded By, (t) = Taken By, (c) = Cosigned By      Initials Name Provider Type    RR Luzma Yun, PT Physical Therapist                   Mobility       Row Name 08/29/24 1406          Bed Mobility    Comment, (Bed Mobility) found in chair, left in chair  -RR       Row Name 08/29/24 1406          Bed-Chair Transfer    Bed-Chair [place] (Transfers) minimum assist (75% patient effort)  -RR     Assistive Device (Bed-Chair Transfers) walker, front-wheeled  -RR       Row Name 08/29/24 1406          Sit-Stand Transfer    Sit-Stand [place] (Transfers) minimum assist (75% patient effort)  -RR     Assistive Device (Sit-Stand Transfers) walker, front-wheeled  -RR       Row Name 08/29/24 1406          Gait/Stairs (Locomotion)    [place] Level (Gait) minimum assist (75% patient effort)  -RR     Assistive Device (Gait) walker, front-wheeled  -RR     Patient was able to Ambulate yes  -RR     Distance in Feet  (Gait) 15  -RR     Deviations/Abnormal Patterns (Gait) base of support, wide;angelica decreased;stride length decreased  -RR     Left Sided Gait Deviations forward flexed posture  -RR     [place] Level (Stairs) not tested  -RR               User Key  (r) = Recorded By, (t) = Taken By, (c) = Cosigned By      Initials Name Provider Type    RR Luzma Yun PT Physical Therapist                   Obj/Interventions       Row Name 08/29/24 1408          Range of Motion Comprehensive    General Range of Motion bilateral lower extremity ROM WFL  -RR       Row Name 08/29/24 1408          Strength Comprehensive (MMT)    General Manual Muscle Testing (MMT) Assessment lower extremity strength deficits identified  -RR     Comment, General Manual Muscle Testing (MMT) Assessment grossly 54-/5  -RR       Row Name 08/29/24 1408          Balance    Balance Assessment standing static balance;standing dynamic balance  -RR     Static Standing Balance minimal assist  -RR     Dynamic Standing Balance minimal assist  -RR     Position/Device Used, Standing Balance supported  -RR       Row Name 08/29/24 1408          Sensory Assessment (Somatosensory)    Sensory Assessment (Somatosensory) bilateral LE  -RR     Bilateral LE Sensory Assessment general sensation;impaired;proprioception  unable to detect light touch LLE stocking pattern, deminished for all dermatomes, RLE impaired stocking pattern, deminished all dermatomes.  Absent proprioception B great toe  -RR               User Key  (r) = Recorded By, (t) = Taken By, (c) = Cosigned By      Initials Name Provider Type    RR Luzma Yun PT Physical Therapist                   Goals/Plan       Row Name 08/29/24 1416          Bed Mobility Goal 1 (PT)    Activity/Assistive Device (Bed Mobility Goal 1, PT) bed mobility activities, all  -RR     [place] Level/Cues Needed (Bed Mobility Goal 1, PT) modified independence  -RR     Time Frame (Bed Mobility Goal 1, PT) long term  "goal (LTG);2 weeks  -RR       Row Name 08/29/24 1416          Transfer Goal 1 (PT)    Activity/Assistive Device (Transfer Goal 1, PT) transfers, all  -RR     [place] Level/Cues Needed (Transfer Goal 1, PT) modified independence  -RR     Time Frame (Transfer Goal 1, PT) long term goal (LTG);2 weeks  -RR       Row Name 08/29/24 1416          Gait Training Goal 1 (PT)    Activity/Assistive Device (Gait Training Goal 1, PT) gait (walking locomotion);walker, rolling  -RR     [place] Level (Gait Training Goal 1, PT) modified independence  -RR     Time Frame (Gait Training Goal 1, PT) long term goal (LTG);2 weeks  -RR       Row Name 08/29/24 1416          Therapy Assessment/Plan (PT)    Planned Therapy Interventions (PT) balance training;bed mobility training;gait training;home exercise program;manual therapy techniques;neuromuscular re-education;patient/family education;stair training;strengthening;transfer training  -RR               User Key  (r) = Recorded By, (t) = Taken By, (c) = Cosigned By      Initials Name Provider Type    RR Luzma Yun, PT Physical Therapist                   Clinical Impression       Row Name 08/29/24 1409          Pain    Pretreatment Pain Rating 0/10 - no pain  -RR     Posttreatment Pain Rating 6/10  -RR     Pain Location - knee  -RR     Pre/Posttreatment Pain Comment L knee pain which is chronic, improves with repositioning and rest  -RR     Pain Intervention(s) Repositioned  -RR       Row Name 08/29/24 1409          Plan of Care Review    Plan of Care Reviewed With patient  -RR     Outcome Evaluation Ignacio Miranda is a 79 y.o. male with a CMH of T2DM c/b neuropathy, h/o TIA, PVD, RLS, chronic back pain and h/o prostate CA who presented to [facility] on 8/28/2024 following fall. He reports that he was walking with walker when he tripped over his own feet and became weak in both legs which caused them to \"buckle underneath\" him and subsequently fell. He reports " lowering to the floor but denies hitting head and LOC. He states he has had several falls over the past week with 2-3 falls per day.   CTH with no acute findings. CT lumbar spine with advanced multi level DDD with varying.  He is cleared for PT evaluation by nursing and the patient himself is agreeable and verbose througout.  In his normal state he utilizes a rollator for all mobility and required assistance for stairs and ADLs from family.  He resides with his spouse in a [facility] with 3 PINA.  Upon evaluation he is found to have significant impairements with BLE strength, sensation, and proprioception all of which likely contribute to balance and mobility deficits.  He requires Genaro for transitions and mobiltiy using the RW.  He is below his fucntional baseline and would not be safe to return to his home environment at this time, he would greatly benefit for SNF level of care for ongoing rehabilitation and nurisng care to improve balance and mobility and he is agreeable.  Will continue to follow while IP for strengthening, balance, and progressive mobility training to improve fucntion, reduce falls risk, and reduce secondary complications from immobility.  -RR       Row Name 08/29/24 1409          Therapy Assessment/Plan (PT)    Patient/Family Therapy Goals Statement (PT) rehab to home  -RR     Criteria for Skilled Interventions Met (PT) skilled treatment is necessary  -RR       Row Name 08/29/24 1409          Positioning and Restraints    Pre-Treatment Position sitting in chair/recliner  -RR     Post Treatment Position chair  -RR     In Chair call light within reach;exit alarm on  -RR               User Key  (r) = Recorded By, (t) = Taken By, (c) = Cosigned By      Initials Name Provider Type    Luzma Muir, PT Physical Therapist                   Outcome Measures       Row Name 08/29/24 1417 08/29/24 1200       How much help from another person do you currently need...    Turning from your back to your side  while in flat bed without using bedrails? 3  -RR 3  -KD    Moving from lying on back to sitting on the side of a flat bed without bedrails? 3  -RR 2  -KD    Moving to and from a bed to a chair (including a wheelchair)? 3  -RR 2  -KD    Standing up from a chair using your arms (e.g., wheelchair, bedside chair)? 2  -RR 2  -KD    Climbing 3-5 steps with a railing? 2  -RR 2  -KD    To walk in hospital room? 2  -RR 2  -KD    AM-PAC 6 Clicks Score (PT) 15  -RR 13  -KD    Highest Level of Mobility Goal 4 --> Transfer to chair/commode  -RR 4 --> Transfer to chair/commode  -KD      Row Name 08/29/24 0800          How much help from another person do you currently need...    Turning from your back to your side while in flat bed without using bedrails? 3  -KD     Moving from lying on back to sitting on the side of a flat bed without bedrails? 2  -KD     Moving to and from a bed to a chair (including a wheelchair)? 2  -KD     Standing up from a chair using your arms (e.g., wheelchair, bedside chair)? 2  -KD     Climbing 3-5 steps with a railing? 2  -KD     To walk in hospital room? 2  -KD     AM-PAC 6 Clicks Score (PT) 13  -KD     Highest Level of Mobility Goal 4 --> Transfer to chair/commode  -KD               User Key  (r) = Recorded By, (t) = Taken By, (c) = Cosigned By      Initials Name Provider Type    KD Bri Awad RN Registered Nurse    Luzma Muir, PT Physical Therapist                                 Physical Therapy Education       Title: PT OT SLP Therapies (Done)       Topic: Physical Therapy (Done)       Point: Mobility training (Done)       Learning Progress Summary             Patient Acceptance, TB,E, VU by RR at 8/29/2024 1417                         Point: Home exercise program (Done)       Learning Progress Summary             Patient Acceptance, TB,E, VU by RR at 8/29/2024 1417                                         User Key       Initials Effective Dates Name Provider Type Discipline    RR  "07/01/24 -  Luzma Yun, PT Physical Therapist PT                  PT Recommendation and Plan  Planned Therapy Interventions (PT): balance training, bed mobility training, gait training, home exercise program, manual therapy techniques, neuromuscular re-education, patient/family education, stair training, strengthening, transfer training  Plan of Care Reviewed With: patient  Outcome Evaluation: Ignacio Miranda is a 79 y.o. male with a CMH of T2DM c/b neuropathy, h/o TIA, PVD, RLS, chronic back pain and h/o prostate CA who presented to [facility] on 8/28/2024 following fall. He reports that he was walking with walker when he tripped over his own feet and became weak in both legs which caused them to \"buckle underneath\" him and subsequently fell. He reports lowering to the floor but denies hitting head and LOC. He states he has had several falls over the past week with 2-3 falls per day.   CTH with no acute findings. CT lumbar spine with advanced multi level DDD with varying.  He is cleared for PT evaluation by nursing and the patient himself is agreeable and verbose througout.  In his normal state he utilizes a rollator for all mobility and required assistance for stairs and ADLs from family.  He resides with his spouse in a [facility] with 3 PINA.  Upon evaluation he is found to have significant impairements with BLE strength, sensation, and proprioception all of which likely contribute to balance and mobility deficits.  He requires Genaro for transitions and mobiltiy using the RW.  He is below his fucntional baseline and would not be safe to return to his home environment at this time, he would greatly benefit for SNF level of care for ongoing rehabilitation and nurisng care to improve balance and mobility and he is agreeable.  Will continue to follow while IP for strengthening, balance, and progressive mobility training to improve fucntion, reduce falls risk, and reduce secondary complications from " immobility.     Time Calculation:         PT Charges       Row Name 08/29/24 1418             Time Calculation    Start Time 1344  -RR      Stop Time 1403  -RR      Time Calculation (min) 19 min  -RR      PT Received On 08/29/24  -RR      PT - Next Appointment 08/30/24  -RR      PT Goal Re-Cert Due Date 09/12/24  -RR                User Key  (r) = Recorded By, (t) = Taken By, (c) = Cosigned By      Initials Name Provider Type    RR Luzma Yun, JESSY Physical Therapist                  Therapy Charges for Today       Code Description Service Date Service Provider Modifiers Qty    26854110990 HC PT EVAL MOD COMPLEXITY 4 8/29/2024 Luzma Yun, PT GP 1            PT G-Codes  AM-PAC 6 Clicks Score (PT): 15  PT Discharge Summary  Anticipated Discharge Disposition (PT): skilled nursing facility    Luzma Yun PT  8/29/2024

## 2024-08-29 NOTE — PROGRESS NOTES
[facility] MEDICINE SERVICE  DAILY PROGRESS NOTE    NAME: Ignacio Miranda  : 1945  MRN: [id number]      LOS: 0 days     PROVIDER OF SERVICE: Halie Tobias MD    Chief Complaint: Back pain    Subjective:     Interval History:  History taken from: Patient and patient's chart   Patient Complaints: c/o bilateral peripheral neuropathy  Patient Denies:  chest pain , nausea and vomiting    Review of Systems:   Review of Systems  All negative except above   Objective:     Vital Signs  Temp:  [97.5 °F (36.4 °C)-97.8 °F (36.6 °C)] 97.5 °F (36.4 °C)  Heart Rate:  [] 92  Resp:  [12-15] 12  BP: (106-141)/(66-90) 136/74  Flow (L/min):  [1-2] 1   Body mass index is 37.57 kg/m².    Physical Exam  General: Alert and oriented, no acute distress.  HENT: Normocephalic, moist oral mucosa, no scleral icterus.  Neck: Supple, non-tender, no carotid bruits, no JVD, no LAD.  Lungs: Clear to auscultation, non-labored respiration.  Heart: RRR, no murmur, gallop or edema.  Abdomen: Soft, nontender, non-distended, + bowel sounds.  Musculoskeletal: Normal range of motion and strength, no tenderness or swelling.  Neuro: alert and awake, moving all 4 extremities   Skin: Skin is warm, dry and pink, no rashes or lesions.  Psychiatric: Cooperative, appropriate mood and affect.        Scheduled Meds   atorvastatin, 20 mg, Oral, Nightly  cefTRIAXone, 2,000 mg, Intravenous, Q24H  gabapentin, 600 mg, Oral, Q12H  insulin lispro, 2-9 Units, Subcutaneous, 4x Daily AC & at Bedtime  multivitamin with minerals, 1 tablet, Oral, Daily       PRN Meds     acetaminophen    calcium carbonate    Calcium Replacement - Follow Nurse / BPA Driven Protocol    dextrose    dextrose    glucagon (human recombinant)    HYDROcodone-acetaminophen    Magnesium Standard Dose Replacement - Follow Nurse / BPA Driven Protocol    melatonin    ondansetron ODT    oxyCODONE    Phosphorus Replacement - Follow Nurse / BPA Driven Protocol    Potassium  Replacement - Follow Nurse / BPA Driven Protocol   Infusions         Diagnostic Data    Results from last 7 days   Lab Units 08/29/24  0321 08/28/24  1358   WBC 10*3/mm3 8.43 9.05   HEMOGLOBIN g/dL 12.3* 12.0*   HEMATOCRIT % 39.5 37.6   PLATELETS 10*3/mm3 158 175   GLUCOSE mg/dL 163* 155*   CREATININE mg/dL 1.02 1.08   BUN mg/dL 15 19   SODIUM mmol/L 140 139   POTASSIUM mmol/L 4.5 4.5   AST (SGOT) U/L  --  31   ALT (SGPT) U/L  --  14   ALK PHOS U/L  --  93   BILIRUBIN mg/dL  --  0.7   ANION GAP mmol/L 10.0 9.7       CT Lumbar Spine Without Contrast    Result Date: 8/28/2024  Impression: 1. Advanced degenerative changes in the lumbar spine. 2. No acute lumbar spine findings. 3. Please refer to the body the report for level by level findings Electronically Signed: Caterina Delatorre MD  8/28/2024 2:08 PM EDT  Workstation ID: [id number]    CT Head Without Contrast    Result Date: 8/28/2024  Impression: No acute intracranial process. Evidence of acute sphenoid sinusitis. Electronically Signed: Taniya Rodriguez MD  8/28/2024 2:00 PM EDT  Workstation ID: [id number]       I have reviewed patient labs and imaging     Assessment/Plan:     Active and Resolved Problems  Generalized weakness/Deconditioning  Acute on chronic back pain   Recurrent falls  CTH, CT L spine with multi level DDD and varying degrees of canal/foraminal narrowing  Labs unremarkable. No leukocytosis  UA with no UTI  Prn pain medication   PT/OT to eval, recommended SNF placement   Fall precautions  No need for neurosurgery consult, no acute radiculopathy symptoms     Diabetes Mellitus c/b neuropathy and retinopathy    Mod SSI   Consistent carb diet  Hypoglycemia protocol      Hyperlipidemia  Continue statin     H/o TIA  No residual deficits noted  Continue to monitor      RLS  Continue Requip     H/o prostate CA  S/p radiation   C/b urinary incontinence    VTE Prophylaxis:  Mechanical VTE prophylaxis orders are present.         Code status is   Code Status  and Medical Interventions: CPR (Attempt to Resuscitate); Full Support   Ordered at: 08/28/24 1657     Code Status (Patient has no pulse and is not breathing):    CPR (Attempt to Resuscitate)     Medical Interventions (Patient has pulse or is breathing):    Full Support       Plan for disposition:1-2 days pending SNF placement     Time: 30 minutes    Signature: Electronically signed by Halie Tobias MD, 08/29/24, 15:21 EDT.  [facility], [place], operated by [facility] Hospitalist Team

## 2024-08-29 NOTE — CONSULTS
Diabetes Education    Patient Name:  Ignacio Miranda  YOB: 1945  MRN: [id number]  Admit Date:  8/28/2024    Follow up note: Met with pt and wife at bedside. Instructed wife in use of [employer] Guide Me meter. Wife able to insert test strip into meter correctly. Wife verbalized understanding of where to apply the blood sample. Wife loaded lancet into lancing device and demonstrated correct use of how to prick finger. Wife verbalized understanding of all info. Rxs have been started for pt to be able to get meter at local pharmacy. Secure chat has been sent to MD to send rxs through at discharge. Pt/wife with no additional questions.       Electronically signed by:  Carly Rolon RN  08/29/24 15:35 EDT

## 2024-08-30 LAB
GLUCOSE BLDC GLUCOMTR-MCNC: 192 MG/DL (ref 70–105)
GLUCOSE BLDC GLUCOMTR-MCNC: 246 MG/DL (ref 70–105)
GLUCOSE BLDC GLUCOMTR-MCNC: 263 MG/DL (ref 70–105)
GLUCOSE BLDC GLUCOMTR-MCNC: 285 MG/DL (ref 70–105)
GLUCOSE BLDC GLUCOMTR-MCNC: 296 MG/DL (ref 70–105)

## 2024-08-30 PROCEDURE — 25010000002 CEFTRIAXONE PER 250 MG: Performed by: EMERGENCY MEDICINE

## 2024-08-30 PROCEDURE — 63710000001 INSULIN LISPRO (HUMAN) PER 5 UNITS: Performed by: STUDENT IN AN ORGANIZED HEALTH CARE EDUCATION/TRAINING PROGRAM

## 2024-08-30 PROCEDURE — 82948 REAGENT STRIP/BLOOD GLUCOSE: CPT

## 2024-08-30 PROCEDURE — 63710000001 INSULIN LISPRO (HUMAN) PER 5 UNITS

## 2024-08-30 PROCEDURE — 97116 GAIT TRAINING THERAPY: CPT

## 2024-08-30 PROCEDURE — 97110 THERAPEUTIC EXERCISES: CPT

## 2024-08-30 PROCEDURE — 97530 THERAPEUTIC ACTIVITIES: CPT

## 2024-08-30 PROCEDURE — 63710000001 INSULIN GLARGINE PER 5 UNITS: Performed by: STUDENT IN AN ORGANIZED HEALTH CARE EDUCATION/TRAINING PROGRAM

## 2024-08-30 RX ORDER — INSULIN LISPRO 100 [IU]/ML
1-200 INJECTION, SOLUTION INTRAVENOUS; SUBCUTANEOUS AS NEEDED
Status: DISCONTINUED | OUTPATIENT
Start: 2024-08-30 | End: 2024-08-31

## 2024-08-30 RX ORDER — NICOTINE POLACRILEX 4 MG
15 LOZENGE BUCCAL
Status: DISCONTINUED | OUTPATIENT
Start: 2024-08-30 | End: 2024-08-31 | Stop reason: SDUPTHER

## 2024-08-30 RX ORDER — IBUPROFEN 600 MG/1
1 TABLET ORAL
Status: DISCONTINUED | OUTPATIENT
Start: 2024-08-30 | End: 2024-08-31 | Stop reason: SDUPTHER

## 2024-08-30 RX ORDER — INSULIN LISPRO 100 [IU]/ML
1-200 INJECTION, SOLUTION INTRAVENOUS; SUBCUTANEOUS
Status: DISCONTINUED | OUTPATIENT
Start: 2024-08-30 | End: 2024-08-31

## 2024-08-30 RX ORDER — DEXTROSE MONOHYDRATE 25 G/50ML
10-50 INJECTION, SOLUTION INTRAVENOUS
Status: DISCONTINUED | OUTPATIENT
Start: 2024-08-30 | End: 2024-09-01 | Stop reason: HOSPADM

## 2024-08-30 RX ADMIN — INSULIN LISPRO 2 UNITS: 100 INJECTION, SOLUTION INTRAVENOUS; SUBCUTANEOUS at 20:21

## 2024-08-30 RX ADMIN — CEFTRIAXONE 2000 MG: 2 INJECTION, POWDER, FOR SOLUTION INTRAMUSCULAR; INTRAVENOUS at 17:26

## 2024-08-30 RX ADMIN — INSULIN LISPRO 2 UNITS: 100 INJECTION, SOLUTION INTRAVENOUS; SUBCUTANEOUS at 07:30

## 2024-08-30 RX ADMIN — INSULIN LISPRO 6 UNITS: 100 INJECTION, SOLUTION INTRAVENOUS; SUBCUTANEOUS at 11:26

## 2024-08-30 RX ADMIN — GABAPENTIN 600 MG: 300 CAPSULE ORAL at 20:21

## 2024-08-30 RX ADMIN — ATORVASTATIN CALCIUM 20 MG: 20 TABLET, FILM COATED ORAL at 20:21

## 2024-08-30 RX ADMIN — INSULIN GLARGINE 28 UNITS: 100 INJECTION, SOLUTION SUBCUTANEOUS at 20:21

## 2024-08-30 RX ADMIN — GABAPENTIN 600 MG: 300 CAPSULE ORAL at 10:31

## 2024-08-30 RX ADMIN — Medication 1 TABLET: at 10:31

## 2024-08-30 NOTE — PLAN OF CARE
Goal Outcome Evaluation:  Plan of Care Reviewed With: patient        Progress: improving  Outcome Evaluation: Continue to monitor

## 2024-08-30 NOTE — PLAN OF CARE
Problem: Adult Inpatient Plan of Care  Goal: Plan of Care Review  Outcome: Ongoing, Progressing  Flowsheets (Taken 8/30/2024 1312)  Plan of Care Reviewed With: patient  Goal: Patient-Specific Goal (Individualized)  Outcome: Ongoing, Progressing  Goal: Absence of Hospital-Acquired Illness or Injury  Outcome: Ongoing, Progressing  Intervention: Identify and Manage Fall Risk  Recent Flowsheet Documentation  Taken 8/30/2024 1200 by Bri Awad RN  Safety Promotion/Fall Prevention: safety round/check completed  Taken 8/30/2024 1000 by Bri Awad RN  Safety Promotion/Fall Prevention:   safety round/check completed   nonskid shoes/slippers when out of bed   gait belt   fall prevention program maintained   activity supervised  Intervention: Prevent Skin Injury  Recent Flowsheet Documentation  Taken 8/30/2024 1200 by Bri Awad RN  Body Position: position changed independently  Skin Protection: incontinence pads utilized  Intervention: Prevent and Manage VTE (Venous Thromboembolism) Risk  Recent Flowsheet Documentation  Taken 8/30/2024 1200 by Bri Awad RN  Activity Management: activity encouraged  VTE Prevention/Management:   bilateral   compression stockings off  Range of Motion: active ROM (range of motion) encouraged  Intervention: Prevent Infection  Recent Flowsheet Documentation  Taken 8/30/2024 1200 by Bri Awad RN  Infection Prevention: hand hygiene promoted  Taken 8/30/2024 1000 by Bri Awad RN  Infection Prevention:   hand hygiene promoted   rest/sleep promoted  Goal: Optimal Comfort and Wellbeing  Outcome: Ongoing, Progressing  Intervention: Provide Person-Centered Care  Recent Flowsheet Documentation  Taken 8/30/2024 1200 by Bri Awad RN  Trust Relationship/Rapport:   care explained   choices provided  Goal: Readiness for Transition of Care  Outcome: Ongoing, Progressing     Problem: Skin Injury Risk Increased  Goal: Skin Health and Integrity  Outcome: Ongoing,  Progressing  Intervention: Optimize Skin Protection  Recent Flowsheet Documentation  Taken 8/30/2024 1200 by Bri Awad RN  Pressure Reduction Techniques: frequent weight shift encouraged  Head of Bed (HOB) Positioning: HOB at 30 degrees  Pressure Reduction Devices: pressure-redistributing mattress utilized  Skin Protection: incontinence pads utilized     Problem: Fall Injury Risk  Goal: Absence of Fall and Fall-Related Injury  Outcome: Ongoing, Progressing  Intervention: Identify and Manage Contributors  Recent Flowsheet Documentation  Taken 8/30/2024 1200 by Bri Awad RN  Medication Review/Management: medications reviewed  Taken 8/30/2024 1000 by Bri Awad RN  Medication Review/Management: medications reviewed  Intervention: Promote Injury-Free Environment  Recent Flowsheet Documentation  Taken 8/30/2024 1200 by Bri Awad RN  Safety Promotion/Fall Prevention: safety round/check completed  Taken 8/30/2024 1000 by Bri Awad RN  Safety Promotion/Fall Prevention:   safety round/check completed   nonskid shoes/slippers when out of bed   gait belt   fall prevention program maintained   activity supervised   Goal Outcome Evaluation:  Plan of Care Reviewed With: patient

## 2024-08-30 NOTE — THERAPY TREATMENT NOTE
"Subjective: Pt agreeable to therapeutic plan of care.    Objective:     Bed mobility - Supine<>sitting min A    Transfers - CGA and with rolling walker    Ambulation - 20 feet CGA and with rolling walker. Notable BLE weakness with forward flexed posture.     Therapeutic Exercise - 20 Reps B LE AROM unsupported sitting / EOB    Vitals: Desaturates on 1L with exertion.     Pain: 0 VAS   Location:   Intervention for pain: N/A    Education: Provided education on the importance of mobility in the acute care setting, Verbal/Tactile Cues, Transfer Training, and Gait Training    Assessment: Ignacio Miranda presents with functional mobility impairments which indicate the need for skilled intervention. Tolerating session today without incident. Will continue to follow and progress as tolerated.     Plan/Recommendations:   If medically appropriate, Moderate Intensity Therapy recommended post-acute care. This is recommended as therapy feels the patient would require 3-4 days per week and wouldn't tolerate \"3 hour daily\" rehab intensity. SNF would be the preferred choice. If the patient does not agree to SNF, arrange HH or OP depending on home bound status. If patient is medically complex, consider LTACH. Pt requires no DME at discharge.     Pt desires Skilled Rehab placement at discharge. Pt cooperative; agreeable to therapeutic recommendations and plan of care.     Modified [place]: N/A = No pre-op stroke/TIA    Post-Tx Position: Supine with HOB Elevated, Alarms activated, and Call light and personal items within reach  PPE: gloves    "

## 2024-08-30 NOTE — CASE MANAGEMENT/SOCIAL WORK
Continued Stay Note   [facility]     Patient Name: Ignacio Miranda  MRN: [id number]  Today's Date: 8/30/2024    Admit Date: 8/28/2024    Plan: D/C Plan: [facility]; No pre-cert required.  PASRR approved.  Transport by family car.   Discharge Plan       Row Name 08/30/24 1515       Plan    Plan D/C Plan: [facility]; No pre-cert required.  PASRR approved.  Transport by family car.                     Natalie Coffey RN\RN/.  Office Ph. [phone]  Cell Ph.  [phone]

## 2024-08-30 NOTE — PLAN OF CARE
"Assessment: Ignacio Miranda presents with functional mobility impairments which indicate the need for skilled intervention. Tolerating session today without incident. Will continue to follow and progress as tolerated.     Plan/Recommendations:   If medically appropriate, Moderate Intensity Therapy recommended post-acute care. This is recommended as therapy feels the patient would require 3-4 days per week and wouldn't tolerate \"3 hour daily\" rehab intensity. SNF would be the preferred choice. If the patient does not agree to SNF, arrange HH or OP depending on home bound status. If patient is medically complex, consider LTACH. Pt requires no DME at discharge.     Pt desires Skilled Rehab placement at discharge. Pt cooperative; agreeable to therapeutic recommendations and plan of care.     "

## 2024-08-30 NOTE — PROGRESS NOTES
[facility] MEDICINE SERVICE  DAILY PROGRESS NOTE    NAME: Ignacio Miranda  : 1945  MRN: [id number]      LOS: 1 day     PROVIDER OF SERVICE: Halie Tobias MD    Chief Complaint: Back pain    Subjective:     Interval History:  History taken from: Patient and patient's chart   Patient Complaints: denies any new complaints   Patient Denies:  chest pain , nausea and vomiting    Review of Systems:   Review of Systems  All negative except above   Objective:     Vital Signs  Temp:  [97 °F (36.1 °C)-98.6 °F (37 °C)] 98.5 °F (36.9 °C)  Heart Rate:  [] 101  Resp:  [10-14] 14  BP: (124-148)/(53-82) 143/82  Flow (L/min):  [1] 1   Body mass index is 36.04 kg/m².    Physical Exam  General: Alert and oriented, no acute distress.  HENT: Normocephalic, moist oral mucosa, no scleral icterus.  Neck: Supple, non-tender, no carotid bruits, no JVD, no LAD.  Lungs: Clear to auscultation, non-labored respiration.  Heart: RRR, no murmur, gallop or edema.  Abdomen: Soft, nontender, non-distended, + bowel sounds.  Musculoskeletal: Normal range of motion and strength, no tenderness or swelling.  Neuro: alert and awake, moving all 4 extremities   Skin: Skin is warm, dry and pink, no rashes or lesions.  Psychiatric: Cooperative, appropriate mood and affect.        Scheduled Meds   atorvastatin, 20 mg, Oral, Nightly  cefTRIAXone, 2,000 mg, Intravenous, Q24H  gabapentin, 600 mg, Oral, Q12H  insulin glargine, 1-200 Units, Subcutaneous, Nightly - Glucommander  insulin lispro, 1-200 Units, Subcutaneous, 4x Daily With Meals & Nightly  multivitamin with minerals, 1 tablet, Oral, Daily       PRN Meds     acetaminophen    calcium carbonate    Calcium Replacement - Follow Nurse / BPA Driven Protocol    dextrose    dextrose    glucagon (human recombinant)    HYDROcodone-acetaminophen    insulin lispro    Magnesium Standard Dose Replacement - Follow Nurse / BPA Driven Protocol    melatonin    ondansetron ODT    oxyCODONE     Phosphorus Replacement - Follow Nurse / BPA Driven Protocol    Potassium Replacement - Follow Nurse / BPA Driven Protocol   Infusions         Diagnostic Data    Results from last 7 days   Lab Units 08/29/24  0321 08/28/24  1358   WBC 10*3/mm3 8.43 9.05   HEMOGLOBIN g/dL 12.3* 12.0*   HEMATOCRIT % 39.5 37.6   PLATELETS 10*3/mm3 158 175   GLUCOSE mg/dL 163* 155*   CREATININE mg/dL 1.02 1.08   BUN mg/dL 15 19   SODIUM mmol/L 140 139   POTASSIUM mmol/L 4.5 4.5   AST (SGOT) U/L  --  31   ALT (SGPT) U/L  --  14   ALK PHOS U/L  --  93   BILIRUBIN mg/dL  --  0.7   ANION GAP mmol/L 10.0 9.7       No radiology results for the last day      I have reviewed patient labs and imaging     Assessment/Plan:     Active and Resolved Problems  Generalized weakness/Deconditioning  Acute on chronic back pain   Recurrent falls  CTH, CT L spine with multi level DDD and varying degrees of canal/foraminal narrowing  Labs unremarkable. No leukocytosis  UA with no UTI  Prn pain medication   PT/OT to eval, recommended SNF placement   Fall precautions  No need for neurosurgery consult, no acute radiculopathy symptoms     Diabetes Mellitus c/b neuropathy and retinopathy    Mod SSI   Consistent carb diet  Hypoglycemia protocol      Hyperlipidemia  Continue statin     H/o TIA  No residual deficits noted  Continue to monitor      RLS  Continue Requip     H/o prostate CA  S/p radiation   C/b urinary incontinence    VTE Prophylaxis:  Mechanical VTE prophylaxis orders are present.         Code status is   Code Status and Medical Interventions: CPR (Attempt to Resuscitate); Full Support   Ordered at: 08/28/24 9153     Code Status (Patient has no pulse and is not breathing):    CPR (Attempt to Resuscitate)     Medical Interventions (Patient has pulse or is breathing):    Full Support       Plan for disposition:1-2 days pending SNF placement     Time: 30 minutes    Signature: Electronically signed by Halie Tobias MD, 08/30/24, 14:27  EDT.  [facility] Hospitalist Team

## 2024-08-30 NOTE — CASE MANAGEMENT/SOCIAL WORK
Discharge Planning Assessment   [facility]     Patient Name: Ignacio Miranda  MRN: [id number]  Today's Date: 8/30/2024    Admit Date: 8/28/2024    Plan: D/C Plan: SNF for short term rehab; No pre-cert needed; PASRR approved. (SNF list given. Will review with spouse and give choices by 1p today, 8/30)   Discharge Needs Assessment       Row Name 08/30/24 1023       Living Environment    People in Home child(kath), adult;spouse    Name(s) of People in Home Cesilia-spouse; Moshe-Son    Current Living Arrangements home    Potentially Unsafe Housing Conditions none    In the past 12 months has the electric, gas, oil, or water company threatened to shut off services in your home? No    Primary Care Provided by self    Provides Primary Care For no one, unable/limited ability to care for self    Family Caregiver if Needed child(kath), adult;spouse    Family Caregiver Names Moshe-Son; Maxine-Dtr    Quality of Family Relationships unable to assess    Able to Return to Prior Arrangements other (see comments)  rehab recommended       Resource/Environmental Concerns    Resource/Environmental Concerns none    Transportation Concerns none       Transportation Needs    In the past 12 months, has lack of transportation kept you from medical appointments or from getting medications? no    In the past 12 months, has lack of transportation kept you from meetings, work, or from getting things needed for daily living? No       Food Insecurity    Within the past 12 months, you worried that your food would run out before you got the money to buy more. Never true    Within the past 12 months, the food you bought just didn't last and you didn't have money to get more. Never true       Transition Planning    Patient/Family Anticipates Transition to long-term care facility    Patient/Family Anticipated Services at Transition skilled nursing    Transportation Anticipated family or friend will provide;other (see comments)  W/C van       Discharge Needs  Assessment    Readmission Within the Last 30 Days no previous admission in last 30 days    Current Outpatient/Agency/Support Group skilled nursing facility    Equipment Currently Used at Home rollator;glucometer;grab bar;bath bench    Concerns to be Addressed discharge planning    Equipment Needed After Discharge none    Outpatient/Agency/Support Group Needs skilled nursing facility    Discharge Facility/Level of Care Needs nursing facility, skilled    Provided Post Acute Provider List? Yes    Post Acute Provider List Nursing Home    Delivered To Patient    Method of Delivery In person    Patient's Choice of Community Agency(s) TBD    Current Discharge Risk physical impairment                   Discharge Plan       Row Name 08/30/24 1027       Plan    Plan D/C Plan: SNF for short term rehab; No pre-cert needed; PASRR approved. (SNF list given. Will review with spouse and give choices by 1p today, 8/30)    Patient/Family in Agreement with Plan yes    Post Acute Provider List Nursing Home    Plan Comments Met with pt at bedside; Reviewed IMM, signed, and copy given. Verified PCP and Pharmacy. No difficulties paying for meds. Not eligible for M2B's-will be going to SNF.  List was given.  Lives in home with spouse and son. He was mod.ind in the home until recent fall. [facility] had been following up until about 2 months ago.  Neither he nor his spouse drive. Son or dtr take them to appts.,and other errands as needed. No other needs expressesd or identified.  Barrier to D/C: Medical clearance; pain control                  Continued Care and Services - Admitted Since 8/28/2024    No active coordination exists for this encounter.          Demographic Summary       Row Name 08/30/24 1022       General Information    Admission Type inpatient    Arrived From emergency department    Required Notices Provided Important Message from Medicare    Referral Source admission list    Reason for Consult discharge planning     Preferred Language English                   Functional Status       Row Name 08/30/24 1022       Functional Status    Usual Activity Tolerance moderate    Current Activity Tolerance fair       Functional Status, IADL    Medications independent    Meal Preparation assistive person    Housekeeping assistive person    Laundry assistive person    Shopping assistive person       Mental Status    General Appearance WDL WDL       Mental Status Summary    Recent Changes in Mental Status/Cognitive Functioning no changes       Employment/    Employment Status retired    Current or Previous Occupation not applicable                   Psychosocial    No documentation.                    Natalie Coffey RN  RN/.  Office Ph. [phone]  Cell Ph.  [phone]

## 2024-08-31 LAB
GLUCOSE BLDC GLUCOMTR-MCNC: 184 MG/DL (ref 70–105)
GLUCOSE BLDC GLUCOMTR-MCNC: 193 MG/DL (ref 70–105)
GLUCOSE BLDC GLUCOMTR-MCNC: 252 MG/DL (ref 70–105)
GLUCOSE BLDC GLUCOMTR-MCNC: 271 MG/DL (ref 70–105)

## 2024-08-31 PROCEDURE — 82948 REAGENT STRIP/BLOOD GLUCOSE: CPT | Performed by: STUDENT IN AN ORGANIZED HEALTH CARE EDUCATION/TRAINING PROGRAM

## 2024-08-31 PROCEDURE — 63710000001 INSULIN GLARGINE PER 5 UNITS: Performed by: STUDENT IN AN ORGANIZED HEALTH CARE EDUCATION/TRAINING PROGRAM

## 2024-08-31 PROCEDURE — 82948 REAGENT STRIP/BLOOD GLUCOSE: CPT

## 2024-08-31 PROCEDURE — 63710000001 INSULIN LISPRO (HUMAN) PER 5 UNITS: Performed by: STUDENT IN AN ORGANIZED HEALTH CARE EDUCATION/TRAINING PROGRAM

## 2024-08-31 RX ORDER — IBUPROFEN 600 MG/1
1 TABLET ORAL
Status: DISCONTINUED | OUTPATIENT
Start: 2024-08-31 | End: 2024-09-01 | Stop reason: HOSPADM

## 2024-08-31 RX ORDER — INSULIN LISPRO 100 [IU]/ML
2-9 INJECTION, SOLUTION INTRAVENOUS; SUBCUTANEOUS
Status: DISCONTINUED | OUTPATIENT
Start: 2024-08-31 | End: 2024-09-01 | Stop reason: HOSPADM

## 2024-08-31 RX ORDER — INSULIN LISPRO 100 [IU]/ML
1-9 INJECTION, SOLUTION INTRAVENOUS; SUBCUTANEOUS
Status: DISCONTINUED | OUTPATIENT
Start: 2024-08-31 | End: 2024-08-31

## 2024-08-31 RX ORDER — NICOTINE POLACRILEX 4 MG
15 LOZENGE BUCCAL
Status: DISCONTINUED | OUTPATIENT
Start: 2024-08-31 | End: 2024-09-01 | Stop reason: HOSPADM

## 2024-08-31 RX ORDER — INSULIN LISPRO 100 [IU]/ML
5 INJECTION, SOLUTION INTRAVENOUS; SUBCUTANEOUS
Status: DISCONTINUED | OUTPATIENT
Start: 2024-08-31 | End: 2024-09-01

## 2024-08-31 RX ORDER — DEXTROSE MONOHYDRATE 25 G/50ML
25 INJECTION, SOLUTION INTRAVENOUS
Status: DISCONTINUED | OUTPATIENT
Start: 2024-08-31 | End: 2024-09-01 | Stop reason: HOSPADM

## 2024-08-31 RX ADMIN — GABAPENTIN 600 MG: 300 CAPSULE ORAL at 09:37

## 2024-08-31 RX ADMIN — INSULIN LISPRO 2 UNITS: 100 INJECTION, SOLUTION INTRAVENOUS; SUBCUTANEOUS at 17:49

## 2024-08-31 RX ADMIN — Medication 1 TABLET: at 09:37

## 2024-08-31 RX ADMIN — INSULIN LISPRO 6 UNITS: 100 INJECTION, SOLUTION INTRAVENOUS; SUBCUTANEOUS at 20:41

## 2024-08-31 RX ADMIN — ATORVASTATIN CALCIUM 20 MG: 20 TABLET, FILM COATED ORAL at 20:41

## 2024-08-31 RX ADMIN — GABAPENTIN 600 MG: 300 CAPSULE ORAL at 20:41

## 2024-08-31 RX ADMIN — INSULIN LISPRO 1 UNITS: 100 INJECTION, SOLUTION INTRAVENOUS; SUBCUTANEOUS at 09:37

## 2024-08-31 RX ADMIN — INSULIN LISPRO 5 UNITS: 100 INJECTION, SOLUTION INTRAVENOUS; SUBCUTANEOUS at 13:03

## 2024-08-31 RX ADMIN — INSULIN LISPRO 5 UNITS: 100 INJECTION, SOLUTION INTRAVENOUS; SUBCUTANEOUS at 17:49

## 2024-08-31 RX ADMIN — INSULIN LISPRO 6 UNITS: 100 INJECTION, SOLUTION INTRAVENOUS; SUBCUTANEOUS at 13:03

## 2024-08-31 RX ADMIN — INSULIN GLARGINE 15 UNITS: 100 INJECTION, SOLUTION SUBCUTANEOUS at 13:03

## 2024-08-31 NOTE — PROGRESS NOTES
[facility] MEDICINE SERVICE  DAILY PROGRESS NOTE    NAME: Ignacio Miranda  : 1945  MRN: [id number]      LOS: 2 days     PROVIDER OF SERVICE: Halie Tobias MD    Chief Complaint: Back pain    Subjective:     Interval History:  History taken from: Patient and patient's chart   Patient Complaints: denies any new complaints   Patient Denies:  chest pain , nausea and vomiting    Review of Systems:   Review of Systems  All negative except above   Objective:     Vital Signs  Temp:  [97.7 °F (36.5 °C)-98.4 °F (36.9 °C)] 98.4 °F (36.9 °C)  Heart Rate:  [] 95  Resp:  [12-16] 14  BP: (124-159)/(70-90) 150/86  Flow (L/min):  [1] 1   Body mass index is 35.23 kg/m².    Physical Exam  General: Alert and oriented, no acute distress.  HENT: Normocephalic, moist oral mucosa, no scleral icterus.  Neck: Supple, non-tender, no carotid bruits, no JVD, no LAD.  Lungs: Clear to auscultation, non-labored respiration.  Heart: RRR, no murmur, gallop or edema.  Abdomen: Soft, nontender, non-distended, + bowel sounds.  Musculoskeletal: Normal range of motion and strength, no tenderness or swelling.  Neuro: alert and awake, moving all 4 extremities   Skin: Skin is warm, dry and pink, no rashes or lesions.  Psychiatric: Cooperative, appropriate mood and affect.        Scheduled Meds   atorvastatin, 20 mg, Oral, Nightly  gabapentin, 600 mg, Oral, Q12H  insulin glargine, 15 Units, Subcutaneous, QAM  insulin lispro, 1-9 Units, Subcutaneous, TID With Meals  insulin lispro, 5 Units, Subcutaneous, TID With Meals  multivitamin with minerals, 1 tablet, Oral, Daily       PRN Meds     acetaminophen    calcium carbonate    Calcium Replacement - Follow Nurse / BPA Driven Protocol    dextrose    dextrose    glucagon (human recombinant)    HYDROcodone-acetaminophen    Magnesium Standard Dose Replacement - Follow Nurse / BPA Driven Protocol    melatonin    ondansetron ODT    oxyCODONE    Phosphorus Replacement - Follow Nurse / BPA  Driven Protocol    Potassium Replacement - Follow Nurse / BPA Driven Protocol   Infusions         Diagnostic Data    Results from last 7 days   Lab Units 08/29/24  0321 08/28/24  1358   WBC 10*3/mm3 8.43 9.05   HEMOGLOBIN g/dL 12.3* 12.0*   HEMATOCRIT % 39.5 37.6   PLATELETS 10*3/mm3 158 175   GLUCOSE mg/dL 163* 155*   CREATININE mg/dL 1.02 1.08   BUN mg/dL 15 19   SODIUM mmol/L 140 139   POTASSIUM mmol/L 4.5 4.5   AST (SGOT) U/L  --  31   ALT (SGPT) U/L  --  14   ALK PHOS U/L  --  93   BILIRUBIN mg/dL  --  0.7   ANION GAP mmol/L 10.0 9.7       No radiology results for the last day      I have reviewed patient labs and imaging     Assessment/Plan:     Active and Resolved Problems  Generalized weakness/Deconditioning  Acute on chronic back pain   Recurrent falls  CTH, CT L spine with multi level DDD and varying degrees of canal/foraminal narrowing  Labs unremarkable. No leukocytosis  UA with no UTI  Prn pain medication   PT/OT to eval, recommended SNF placement   Fall precautions  No need for neurosurgery consult, no acute radiculopathy symptoms     Diabetes Mellitus c/b neuropathy and retinopathy    Insulin glargine 15 units qam  Insulin lispro 5 Units TID with meal   Consistent carb diet  Hypoglycemia protocol      Hyperlipidemia  Continue statin     H/o TIA  No residual deficits noted  Continue to monitor      RLS  Continue Requip     H/o prostate CA  S/p radiation   C/b urinary incontinence    VTE Prophylaxis:  Mechanical VTE prophylaxis orders are present.         Code status is   Code Status and Medical Interventions: CPR (Attempt to Resuscitate); Full Support   Ordered at: 08/28/24 4319     Code Status (Patient has no pulse and is not breathing):    CPR (Attempt to Resuscitate)     Medical Interventions (Patient has pulse or is breathing):    Full Support       Plan for disposition:1-2 days pending SNF placement     Time: 30 minutes    Signature: Electronically signed by Halie Tobisa MD,  08/31/24, 11:41 EDT.  [facility] Hospitalist Team

## 2024-09-01 VITALS
HEIGHT: 69 IN | WEIGHT: 239.64 LBS | SYSTOLIC BLOOD PRESSURE: 130 MMHG | OXYGEN SATURATION: 97 % | DIASTOLIC BLOOD PRESSURE: 74 MMHG | RESPIRATION RATE: 13 BRPM | TEMPERATURE: 97.7 F | BODY MASS INDEX: 35.49 KG/M2 | HEART RATE: 107 BPM

## 2024-09-01 LAB
GLUCOSE BLDC GLUCOMTR-MCNC: 210 MG/DL (ref 70–105)
GLUCOSE BLDC GLUCOMTR-MCNC: 234 MG/DL (ref 70–105)
GLUCOSE BLDC GLUCOMTR-MCNC: 264 MG/DL (ref 70–105)

## 2024-09-01 PROCEDURE — 82948 REAGENT STRIP/BLOOD GLUCOSE: CPT

## 2024-09-01 PROCEDURE — 63710000001 INSULIN GLARGINE PER 5 UNITS: Performed by: STUDENT IN AN ORGANIZED HEALTH CARE EDUCATION/TRAINING PROGRAM

## 2024-09-01 PROCEDURE — 97530 THERAPEUTIC ACTIVITIES: CPT

## 2024-09-01 PROCEDURE — 82948 REAGENT STRIP/BLOOD GLUCOSE: CPT | Performed by: STUDENT IN AN ORGANIZED HEALTH CARE EDUCATION/TRAINING PROGRAM

## 2024-09-01 PROCEDURE — 97110 THERAPEUTIC EXERCISES: CPT

## 2024-09-01 PROCEDURE — 63710000001 INSULIN LISPRO (HUMAN) PER 5 UNITS: Performed by: STUDENT IN AN ORGANIZED HEALTH CARE EDUCATION/TRAINING PROGRAM

## 2024-09-01 PROCEDURE — 97116 GAIT TRAINING THERAPY: CPT

## 2024-09-01 RX ORDER — INSULIN LISPRO 100 [IU]/ML
7 INJECTION, SOLUTION INTRAVENOUS; SUBCUTANEOUS
Start: 2024-09-01

## 2024-09-01 RX ORDER — LANCETS
1 EACH MISCELLANEOUS
Qty: 100 EACH | Refills: 2 | Status: SHIPPED | OUTPATIENT
Start: 2024-09-01

## 2024-09-01 RX ORDER — BLOOD-GLUCOSE METER
1 EACH MISCELLANEOUS ONCE
Qty: 1 KIT | Refills: 0 | Status: SHIPPED | OUTPATIENT
Start: 2024-09-01 | End: 2024-09-01

## 2024-09-01 RX ORDER — BLOOD SUGAR DIAGNOSTIC
1 STRIP MISCELLANEOUS
Qty: 100 EACH | Refills: 2 | Status: SHIPPED | OUTPATIENT
Start: 2024-09-01

## 2024-09-01 RX ORDER — INSULIN LISPRO 100 [IU]/ML
7 INJECTION, SOLUTION INTRAVENOUS; SUBCUTANEOUS
Status: DISCONTINUED | OUTPATIENT
Start: 2024-09-01 | End: 2024-09-01 | Stop reason: HOSPADM

## 2024-09-01 RX ORDER — INSULIN GLARGINE 300 U/ML
20 INJECTION, SOLUTION SUBCUTANEOUS
Start: 2024-09-01 | End: 2024-10-01

## 2024-09-01 RX ADMIN — INSULIN LISPRO 6 UNITS: 100 INJECTION, SOLUTION INTRAVENOUS; SUBCUTANEOUS at 12:31

## 2024-09-01 RX ADMIN — INSULIN LISPRO 7 UNITS: 100 INJECTION, SOLUTION INTRAVENOUS; SUBCUTANEOUS at 12:31

## 2024-09-01 RX ADMIN — Medication 1 TABLET: at 09:13

## 2024-09-01 RX ADMIN — GABAPENTIN 600 MG: 300 CAPSULE ORAL at 09:13

## 2024-09-01 RX ADMIN — INSULIN GLARGINE 15 UNITS: 100 INJECTION, SOLUTION SUBCUTANEOUS at 09:13

## 2024-09-01 RX ADMIN — INSULIN LISPRO 5 UNITS: 100 INJECTION, SOLUTION INTRAVENOUS; SUBCUTANEOUS at 09:13

## 2024-09-01 RX ADMIN — INSULIN LISPRO 4 UNITS: 100 INJECTION, SOLUTION INTRAVENOUS; SUBCUTANEOUS at 09:13

## 2024-09-01 NOTE — PROGRESS NOTES
[facility] MEDICINE SERVICE  DAILY PROGRESS NOTE    NAME: Ignacio Miranda  : 1945  MRN: [id number]      LOS: 3 days     PROVIDER OF SERVICE: Halie Tobias MD    Chief Complaint: Back pain    Subjective:     Interval History:  History taken from: Patient and patient's chart   Patient Complaints: denies any new complaints  Patient Denies:  chest pain , nausea and vomiting    Review of Systems:   Review of Systems  All negative except above   Objective:     Vital Signs  Temp:  [97.8 °F (36.6 °C)-98.9 °F (37.2 °C)] 98.4 °F (36.9 °C)  Heart Rate:  [] 106  Resp:  [12-16] 12  BP: (118-137)/(67-78) 118/67   Body mass index is 35.39 kg/m².    Physical Exam  General: Alert and oriented, no acute distress.  HENT: Normocephalic, moist oral mucosa, no scleral icterus.  Neck: Supple, non-tender, no carotid bruits, no JVD, no LAD.  Lungs: Clear to auscultation, non-labored respiration.  Heart: RRR, no murmur, gallop or edema.  Abdomen: Soft, nontender, non-distended, + bowel sounds.  Musculoskeletal: Normal range of motion and strength, no tenderness or swelling.  Neuro: alert and awake, moving all 4 extremities   Skin: Skin is warm, dry and pink, no rashes or lesions.  Psychiatric: Cooperative, appropriate mood and affect.        Scheduled Meds   atorvastatin, 20 mg, Oral, Nightly  gabapentin, 600 mg, Oral, Q12H  [START ON 2024] insulin glargine, 20 Units, Subcutaneous, QAM  insulin lispro, 2-9 Units, Subcutaneous, 4x Daily AC & at Bedtime  insulin lispro, 7 Units, Subcutaneous, TID With Meals  multivitamin with minerals, 1 tablet, Oral, Daily       PRN Meds     acetaminophen    calcium carbonate    Calcium Replacement - Follow Nurse / BPA Driven Protocol    dextrose    dextrose    dextrose    glucagon (human recombinant)    HYDROcodone-acetaminophen    Magnesium Standard Dose Replacement - Follow Nurse / BPA Driven Protocol    melatonin    ondansetron ODT    oxyCODONE    Phosphorus Replacement  - Follow Nurse / BPA Driven Protocol    Potassium Replacement - Follow Nurse / BPA Driven Protocol   Infusions         Diagnostic Data    Results from last 7 days   Lab Units 08/29/24  0321 08/28/24  1358   WBC 10*3/mm3 8.43 9.05   HEMOGLOBIN g/dL 12.3* 12.0*   HEMATOCRIT % 39.5 37.6   PLATELETS 10*3/mm3 158 175   GLUCOSE mg/dL 163* 155*   CREATININE mg/dL 1.02 1.08   BUN mg/dL 15 19   SODIUM mmol/L 140 139   POTASSIUM mmol/L 4.5 4.5   AST (SGOT) U/L  --  31   ALT (SGPT) U/L  --  14   ALK PHOS U/L  --  93   BILIRUBIN mg/dL  --  0.7   ANION GAP mmol/L 10.0 9.7       No radiology results for the last day      I have reviewed patient labs and imaging     Assessment/Plan:     Active and Resolved Problems  Generalized weakness/Deconditioning  Acute on chronic back pain   Recurrent falls  CTH, CT L spine with multi level DDD and varying degrees of canal/foraminal narrowing  Labs unremarkable. No leukocytosis  UA with no UTI  Prn pain medication   PT/OT to eval, recommended SNF placement   Fall precautions  No need for neurosurgery consult, no acute radiculopathy symptoms     Diabetes Mellitus c/b neuropathy and retinopathy    Insulin glargine 15-->20 units qam  Insulin lispro 5-->7 Units TID with meal   Consistent carb diet  Hypoglycemia protocol      Hyperlipidemia  Continue statin     H/o TIA  No residual deficits noted  Continue to monitor      RLS  Continue Requip     H/o prostate CA  S/p radiation   C/b urinary incontinence    VTE Prophylaxis:  Mechanical VTE prophylaxis orders are present.         Code status is   Code Status and Medical Interventions: CPR (Attempt to Resuscitate); Full Support   Ordered at: 08/28/24 7107     Code Status (Patient has no pulse and is not breathing):    CPR (Attempt to Resuscitate)     Medical Interventions (Patient has pulse or is breathing):    Full Support       Plan for disposition:1-2 days pending SNF placement , last facility assigned has a covid outbreak. Patient is  requesting to transfer to another facility. Case management updated.     Time: 30 minutes    Signature: Electronically signed by Halie Tobias MD, 09/01/24, 10:41 EDT.  [facility] Hospitalist Team

## 2024-09-01 NOTE — PLAN OF CARE
[facility] - Outpatient Follow-up Note  Mando Jamil 70 y o  female MRN: [id number]  Encounter: [id number]    ASSESSMENT AND PLAN:      1  Diarrhea   started about 9 months ago  No etiology on labs, stools or colonoscopy cleaning biopsies for microscopic colitis  Significant improvement with reducing Dexilant to 30 mg daily  Still has symptoms about 3 days a week but the improve with Imodium  I recommended keeping a symptom journal to assess for food triggers  She believes she has issues with dairy, will add Lactaid  If symptoms persist will discuss further reducing the PPI or a trial off metformin  We also discussed a spasmolytic like Bentyl     2  Gastroesophageal reflux disease without esophagitis  Well controlled since EGD/dilatation  symptoms remain well controlled after reducing Dexilant 30 mg daily  Continue anti-reflux diet      3  Personal history of colonic polyps  Adenoma July 2020, 5 year recall  Followup Appointment:   2-3 months  ______________________________________________________________________    Chief Complaint   Patient presents with    Follow up intermittent diarrhea     HPI:   The patient returns for follow-up on diarrhea  She was last seen in the office in September  At that time we reduced Dexilant 30 mg daily  It seems to be making a difference  She previously had diarrhea every day, now it is about 3 days a week  She will have up to 5 loose stools in the morning  She takes Imodium with good relief  She denies any abdominal pain or cramping  She denies any rectal bleeding  Her reflux symptoms remain well controlled even after the reduction of Dexilant  She is really happy with symptom improvement  She is unaware of any definitive food triggers but thinks dairy might be a trigger  She has been avoiding ice cream because of this      Historical Information   Past Medical History:   Diagnosis Date    Asthma     Colon Goal Outcome Evaluation:  Plan of Care Reviewed With: patient        Progress: improving                                   cancer screening     Last colonoscopy September 2011    Due for follow-up 2021    Diabetes mellitus ([address] )     Disease of thyroid gland     Fatty liver disease, nonalcoholic     GERD (gastroesophageal reflux disease)     Last EGD 2016    History of pulmonary aspergillosis 2012    Hx of migraine headaches     pt does not have    Hyperlipidemia     Hypertension     Reactive airway disease     Restless leg syndrome      Past Surgical History:   Procedure Laterality Date    COLONOSCOPY      CORNEA LACERATION REPAIR      TONSILLECTOMY      WISDOM TOOTH EXTRACTION       Social History     Substance and Sexual Activity   Alcohol Use Yes    Frequency: 4 or more times a week    Drinks per session: 1 or 2     Social History     Substance and Sexual Activity   Drug Use Not Currently     Social History     Tobacco Use   Smoking Status Former Smoker   Smokeless Tobacco Never Used     Family History   Problem Relation Age of Onset    Heart disease Mother     Heart disease Father     Colon polyps Neg Hx     Colon cancer Neg Hx          Current Outpatient Medications:     albuterol (Proventil HFA) 90 mcg/act inhaler    ALPRAZolam (XANAX) 0 25 mg tablet    amLODIPine (NORVASC) 5 mg tablet    ascorbic acid (VITAMIN C) 500 mg tablet    beclomethasone (QVAR) 40 MCG/ACT inhaler    benzonatate (TESSALON PERLES) 100 mg capsule    budesonide (PULMICORT) 0 5 mg/2 mL nebulizer solution    calcium carbonate (OS-BRITTA) 600 MG tablet    cholecalciferol (VITAMIN D3) 1,000 units tablet    dexlansoprazole (Dexilant) 30 MG capsule    GUAIFENESIN 1200 PO    hydrochlorothiazide (HYDRODIURIL) 25 mg tablet    ipratropium-albuterol, FOR EMS ONLY, (DUO-NEB) 0 5-2 5 mg/3 mL nebulizer solution    losartan (COZAAR) 50 mg tablet    metFORMIN (GLUCOPHAGE-XR) 500 mg 24 hr tablet    nitroglycerin (NITROSTAT) 0 4 mg SL tablet    OZEMPIC, 0 25 OR 0 5 MG/DOSE, 2 MG/1 5ML SOPN    potassium chloride (K-DUR,KLOR-CON) 20 mEq tablet   rosuvastatin (CRESTOR) 20 MG tablet    SYNTHROID 75 MCG tablet    venlafaxine (EFFEXOR-XR) 75 mg 24 hr capsule  Allergies   Allergen Reactions    Epinephrine Shortness Of Breath and Palpitations    Irbesartan-Hydrochlorothiazide      Possible lichen planus    Lisinopril      Lichen planus    Tizanidine      Altered mental status     Reviewed medications and allergies and updated as indicated    PHYSICAL EXAM:    Blood pressure 108/70, pulse 82, height 5' 4" (1 626 m), weight 70 3 kg (155 lb)  Body mass index is 26 61 kg/m²  General Appearance: NAD, cooperative, alert  Eyes: Anicteric, PERRLA, EOMI  ENT:  Normocephalic, atraumatic, normal mucosa  Neck:  Supple, symmetrical, trachea midline  Resp:  Clear to auscultation bilaterally; no rales, rhonchi or wheezing; respirations unlabored   CV:  S1 S2, Regular rate and rhythm; no murmur, rub, or gallop  GI:  Soft, non-tender, non-distended; normal bowel sounds; no masses, no organomegaly   Rectal: Deferred  Musculoskeletal: No cyanosis, clubbing or edema  Normal ROM  Skin:  No jaundice, rashes, or lesions   Heme/Lymph: No palpable cervical lymphadenopathy  Psych: Normal affect, good eye contact  Neuro: No gross deficits, AAOx3    Lab Results:   No results found for: WBC, HGB, HCT, MCV, PLT  No results found for: NA, K, CL, CO2, ANIONGAP, BUN, CREATININE, GLUCOSE, GLUF, CALCIUM, CORRECTEDCA, AST, ALT, ALKPHOS, PROT, BILITOT, EGFR  No results found for: IRON, TIBC, FERRITIN  No results found for: LIPASE    Radiology Results:   No results found

## 2024-09-01 NOTE — THERAPY TREATMENT NOTE
"Subjective: Pt agreeable to therapeutic plan of care.    Objective:     Precautions -   Fall risk    Bed mobility - CGA supine to sit. Pt did well scooting himself out to the edge of the bed to get his feet to the floor.   Transfers - Min-A and with rolling walker sit to stand.  Pt needed time to get his balance in standing before progressing to gait training  Ambulation - 15 feet CGA and with rolling walker   therapist demonstrated proper way to use the walker and pt was able to demonstrate understanding.  Step to gait pattern.  Pt had good approximation to the walker.     Therapeutic Exercise - 15 Reps B LE AROM lying supine and unsupported sitting / EOB    Vitals: WNL    Pain: 8 VAS   Location: left knee with weight bearing  Intervention for pain: Repositioned, RN provided medication, Increased Activity, and Therapeutic Presence    Education: Provided education on the importance of mobility in the acute care setting, Verbal/Tactile Cues, Transfer Training, and Gait Training    Assessment: Ignacio Miranda presents with functional mobility impairments which indicate the need for skilled intervention. Tolerating session today without incident. Pt very cooperative and following direction well during PT tx session.  Pt still with left knee pain during gait training.  Pt still with weakness and has unsteady gait.  Pt still a fall risk. Will continue to follow and progress as tolerated.     Plan/Recommendations:   If medically appropriate, Moderate Intensity Therapy recommended post-acute care. This is recommended as therapy feels the patient would require 3-4 days per week and wouldn't tolerate \"3 hour daily\" rehab intensity. SNF would be the preferred choice. If the patient does not agree to SNF, arrange HH or OP depending on home bound status. If patient is medically complex, consider LTACH. Pt requires no DME at discharge.     Pt desires Skilled Rehab placement at discharge. Pt cooperative; agreeable to therapeutic " recommendations and plan of care.     Basic Mobility 6-click:  Rollin = Total, A lot = 2, A little = 3; 4 = None  Supine>Sit:   1 = Total, A lot = 2, A little = 3; 4 = None   Sit>Stand with arms:  1 = Total, A lot = 2, A little = 3; 4 = None  Bed>Chair:   1 = Total, A lot = 2, A little = 3; 4 = None  Ambulate in room:  1 = Total, A lot = 2, A little = 3; 4 = None  3-5 Steps with railin = Total, A lot = 2, A little = 3; 4 = None  Score: 17    Modified Lesli: 4 = Moderately severe disability (Unable to attend to own bodily needs without assistance, and unable to walk unassisted)     Post-Tx Position: Up in Chair, Alarms activated, and Call light and personal items within reach  PPE: gloves

## 2024-09-01 NOTE — CASE MANAGEMENT/SOCIAL WORK
Continued Stay Note  [facility]     Patient Name: Ignacio Miranda  MRN: [id number]  Today's Date: 9/1/2024    Admit Date: 8/28/2024    Plan: [facility]  No precert required.  PASRR approved.   Discharge Plan       Row Name 09/01/24 1942       Plan    Plan [facility]  No precert required.  PASRR approved.    Plan Comments CM received sc that spouse wished ti discuss [facility].  Called spouse Ada- she stated that when she went to visit [facility] last night that there was a sign on the door that they had a COVID outbreak.  She is refusing that facility at this time- updated yary Carreon.  She was agreeable to go visit [facility] as they had also accepted him.  CM coordinated visit with yary Keller and verified that they still had a bed avail. today.  CM called spouse again at 1330 and she reported that she did not want [facility] now, but wanted him to go to [facility]- she had called and spoken with yary Ortiz.  CM explained difference in level of care but she requested that they review.  CM sent referral to [facility] who confirmed that they did have a bed today and would be able to accept after 5 pm.  CM updated nursing and md and d/c orders obtained.  Updated Arianna with Doug Goldstein of spouse decision.    Final Discharge Disposition Code 62 - inpatient rehab facility    Final Note [facility]                      Expected Discharge Date and Time       Expected Discharge Date Expected Discharge Time    Sep 1, 2024               DENNY Paredes RN  Case Management  [phone] office  [phone] fax

## 2024-09-01 NOTE — PLAN OF CARE
"Goal Outcome Evaluation:      Bed mobility - CGA supine to sit. Pt did well scooting himself out to the edge of the bed to get his feet to the floor.   Transfers - Min-A and with rolling walker sit to stand.  Pt needed time to get his balance in standing before progressing to gait training  Ambulation - 15 feet CGA and with rolling walker   therapist demonstrated proper way to use the walker and pt was able to demonstrate understanding.  Step to gait pattern.  Pt had good approximation to the walker.     Therapeutic Exercise - 15 Reps B LE AROM lying supine and unsupported sitting / EOB      If medically appropriate, Moderate Intensity Therapy recommended post-acute care. This is recommended as therapy feels the patient would require 3-4 days per week and wouldn't tolerate \"3 hour daily\" rehab intensity. SNF would be the preferred choice. If the patient does not agree to SNF, arrange HH or OP depending on home bound status. If patient is medically complex, consider LTACH. Pt requires no DME at discharge.     Pt desires Skilled Rehab placement at discharge. Pt cooperative; agreeable to therapeutic recommendations and plan of care.                                       "

## 2024-09-01 NOTE — DISCHARGE SUMMARY
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         Hospital Course     HPI:  Per the H&P written by Aimee on ,  \"Ignacio Miranda is a 79 y.o. male with a CMH of T2DM c/b neuropathy, h/o TIA, PVD, RLS, chronic back pain and h/o prostate CA who presented to [facility] on 2024 following fall. He reports that he was walking with walker when he tripped over his own feet and became weak in both legs which caused them to \"buckle underneath\" him and subsequently fell. He reports lowering to the floor but denies hitting head and LOC. He states he has had several falls over the past week with 2-3 falls per day. Wife at bedside states that she is then unable to get patient up. He reports worsening of chronic lower back pain when lying down but is relieved when sitting up or standing. He states that he also has difficulties detecting floor due to baseline neuropathy. Denies chest pain, dizziness, shortness of breath, " "cough, abdominal pain, dysuria, fever and chills. Family is amenable to rehab if needed.      On ED evaluation, patient HDS, AF. Labs were grossly unremarkable. UA not suspicious for UTI. CTH with no acute findings. CT lumbar spine with advanced multi level DDD with varying degrees of canal/neural foraminal stenosis. Hospitalist service to admit for further management, PT/OT eval and possible placement (SNF vas ECF).   \"    Hospital Course:  Generalized weakness/Deconditioning  Acute on chronic back pain   Recurrent falls  CTH, CT L spine with multi level DDD and varying degrees of canal/foraminal narrowing  Labs unremarkable. No leukocytosis  UA with no UTI  Prn pain medication   PT/OT to eval, recommended SNF placement   Fall precautions  No need for neurosurgery consult, no acute radiculopathy symptoms     Diabetes Mellitus c/b neuropathy and retinopathy    Insulin glargine 15-->20 units qam  Insulin lispro 5-->7 Units TID with meal   Consistent carb diet  Hypoglycemia protocol      Hyperlipidemia  Continue statin     H/o TIA  No residual deficits noted  Continue to monitor      RLS  Continue Requip     H/o prostate CA  S/p radiation   C/b urinary incontinence        DISCHARGE Follow Up Recommendations for labs and diagnostics:   Follow up with Primary care provider within 3 days of this discharge.  Monitor Blood glucose and adjust insulin accordingly.      Reasons For Change In Medications and Indications for New Medications:  Insulin dose adjusted based on POC glucose trend while inpatent. Monitor POC glucose and adjust insulin accordingly.    Day of Discharge     Vital Signs:  Temp:  [97.8 °F (36.6 °C)-98.9 °F (37.2 °C)] 97.8 °F (36.6 °C)  Heart Rate:  [] 107  Resp:  [12-16] 13  BP: (118-137)/(67-79) 124/79  Flow (L/min):  [1.5] 1.5          Pertinent  and/or Most Recent Results     LAB RESULTS:      Lab 08/29/24  0321 08/28/24  1358   WBC 8.43 9.05   HEMOGLOBIN 12.3* 12.0*   HEMATOCRIT 39.5 37.6 "   PLATELETS 158 175   NEUTROS ABS 5.77 5.23   IMMATURE GRANS (ABS) 0.02 0.02   LYMPHS ABS 1.60 2.47   MONOS ABS 0.72 1.04*   EOS ABS 0.28 0.24   MCV 99.7* 98.4*         Lab 08/29/24  0321 08/28/24  1358   SODIUM 140 139   POTASSIUM 4.5 4.5   CHLORIDE 101 100   CO2 29.0 29.3*   ANION GAP 10.0 9.7   BUN 15 19   CREATININE 1.02 1.08   EGFR 74.8 69.8   GLUCOSE 163* 155*   CALCIUM 9.1 9.5   MAGNESIUM 2.0 1.9   TSH  --  3.740         Lab 08/28/24  1358   TOTAL PROTEIN 7.4   ALBUMIN 3.9   GLOBULIN 3.5   ALT (SGPT) 14   AST (SGOT) 31   BILIRUBIN 0.7   ALK PHOS 93         Lab 08/28/24  1358   HSTROP T 14                 Brief Urine Lab Results  (Last result in the past 365 days)        Color   Clarity   Blood   Leuk Est   Nitrite   Protein   CREAT   Urine HCG        08/28/24 1401 Dark Yellow   Clear   Negative   Trace   Negative   Negative                 Microbiology Results (last 10 days)       ** No results found for the last 240 hours. **            CT Lumbar Spine Without Contrast    Result Date: 8/28/2024  Impression: Impression: 1. Advanced degenerative changes in the lumbar spine. 2. No acute lumbar spine findings. 3. Please refer to the body the report for level by level findings Electronically Signed: Caterina Delatorre MD  8/28/2024 2:08 PM EDT  Workstation ID: [id number]    CT Head Without Contrast    Result Date: 8/28/2024  Impression: Impression: No acute intracranial process. Evidence of acute sphenoid sinusitis. Electronically Signed: Taniya Rodriguez MD  8/28/2024 2:00 PM EDT  Workstation ID: [id number]     Results for orders placed during the hospital encounter of 02/22/22    Duplex Carotid Ultrasound CAR    Interpretation Summary  · Proximal right internal carotid artery mild stenosis.  · Proximal left internal carotid artery mild stenosis.      Results for orders placed during the hospital encounter of 02/22/22    Duplex Carotid Ultrasound CAR    Interpretation Summary  · Proximal right internal carotid artery  mild stenosis.  · Proximal left internal carotid artery mild stenosis.      Results for orders placed during the hospital encounter of 02/22/22    Adult Transthoracic Echo Complete W/ Cont if Necessary Per Protocol    Interpretation Summary  · Left ventricular wall thickness is consistent with mild concentric hypertrophy.  · Calculated left ventricular EF = 60% Estimated left ventricular EF was in agreement with the calculated left ventricular EF.  · There is calcification of the aortic valve.    Transthoracic echocardiography is a technically difficult study.  EF is more than 60%.  Mild to moderate left atrial enlargement.  No effusion.      Electronically signed by Aba Frost MD, 02/24/22, 7:22 PM EST.             Consults:   Consults       No orders found from 7/30/2024 to 8/29/2024.              Discharge Details        Discharge Medications        New Medications        Instructions Start Date   Accu-Chek Guide Me w/Device kit   1 kit, Does not apply, Once, Dx: E11.65      Accu-Chek Guide test strip  Generic drug: glucose blood   1 each, Other, 3 Times Daily Before Meals, Dx: E11.65. Use as instructed      Accu-Chek Softclix Lancets lancets   1 each, Other, 3 Times Daily Before Meals, Dx: E11.65. Use as instructed      insulin lispro 100 UNIT/ML injection  Commonly known as: HUMALOG/ADMELOG   7 Units, Subcutaneous, 3 Times Daily With Meals, Use sliding scale in addition to 7 units TID with meals as below, Blood Glucose 150-199 mg/dL - 2 units Blood Glucose 200-249 mg/dL - 4 units Blood Glucose 250-299 mg/dL - 6 units Blood Glucose 300-349 mg/dL - 7 units Blood Glucose 350-400 mg/dL - 8 units Blood Glucose greater than 400 mg/dL - 9 units             Changes to Medications        Instructions Start Date   [place] Max SoloStar 300 UNIT/ML solution pen-injector injection  Generic drug: Insulin Glargine (2 Unit Dial)  What changed: how much to take   20 Units, Subcutaneous, Every Night at Bedtime              Continue These Medications        Instructions Start Date   atorvastatin 20 MG tablet  Commonly known as: LIPITOR   20 mg, Oral, Daily      DULoxetine 60 MG capsule  Commonly known as: CYMBALTA   60 mg, Oral, Daily      gabapentin 600 MG tablet  Commonly known as: NEURONTIN   600 mg, Oral, 3 Times Daily      multivitamin with minerals tablet tablet   1 tablet, Oral, Daily               No Known Allergies      Discharge Disposition:   Rehab Facility or Unit (DC - External)    Diet:  Hospital:  Diet Order   Procedures    Diet: Cardiac, Diabetic; Healthy Heart (2-3 Na+); Consistent Carbohydrate; Fluid Consistency: Thin (IDDSI 0)         Discharge Activity:   as tolerated      CODE STATUS:  Code Status and Medical Interventions: CPR (Attempt to Resuscitate); Full Support   Ordered at: 08/28/24 0378     Code Status (Patient has no pulse and is not breathing):    CPR (Attempt to Resuscitate)     Medical Interventions (Patient has pulse or is breathing):    Full Support                 Time spent on Discharge including face to face service:  >30 minutes    Signature: Electronically signed by Halie Tobias MD, 09/01/24, 14:10 EDT.  [facility] Hospitalist Team

## 2024-09-01 NOTE — CASE MANAGEMENT/SOCIAL WORK
Case Management Discharge Note      Final Note: [facility]    Provided Post Acute Provider List?: Yes  Post Acute Provider List: Nursing Home  Delivered To: Patient  Method of Delivery: In person    Selected Continued Care - Discharged on 9/1/2024 Admission date: 8/28/2024 - Discharge disposition: Rehab Facility or Unit (DC - External)      Destination Coordination complete.      Service Provider Selected Services Address Phone Fax Patient Preferred    [facility] [address] [address] [phone] --                 Transportation Services  Private: Car    Final Discharge Disposition Code: 62 - inpatient rehab facility

## 2024-09-01 NOTE — DISCHARGE PLACEMENT REQUEST
"Joon Covarrubias (79 y.o. Male)       Date of Birth   1945    Social Security Number       Address   [facility] [address] [place] [facility]    Home Phone   [phone]    MRN   [id number]       [facility]    Marital Status                               Admission Date   8/28/24    Admission Type   Emergency    Admitting Provider   Halie Tobias MD    Attending Provider   Halie Tobias MD    Department, Room/Bed   [facility], 250/1       Discharge Date       Discharge Disposition       Discharge Destination                                 Attending Provider: Halie Tobias MD    Allergies: No Known Allergies    Isolation: None   Infection: None   Code Status: CPR    Ht: 175.3 cm (69\")   Wt: 109 kg (239 lb 10.2 oz)    Admission Cmt: None   Principal Problem: Back pain [M54.9]                   Active Insurance as of 8/28/2024       Primary Coverage       Payor Plan Insurance Group Employer/Plan Group    MEDICARE RAILROAD MEDICARE        Payor Plan Address Payor Plan Phone Number Payor Plan Fax Number Effective Dates    [address] [phone]  1/1/2001 - None Entered    [place] [id number]         Subscriber Name Subscriber Birth Date Member ID       JOON COVARRUBIAS 1945 [id number]               Secondary Coverage       Payor Plan Insurance Group Employer/Plan Group    [facility] [id number]       Payor Plan Address Payor Plan Phone Number Payor Plan Fax Number Effective Dates    [address] [address] [phone]  1/1/2022 - None Entered    [facility] [address]         Subscriber Name Subscriber Birth Date Member ID       JOON COVARRUBIAS 1945 [id number]                     Emergency Contacts        (Rel.) Home Phone Work Phone Mobile Phone    RODRIGO PENA (Daughter) -- -- [phone]    AGUILAR COVARRUBIAS (Spouse) [phone] -- --    KEYSHAWNNANCY (Son) [phone] -- --               " "  History & Physical        Sahra Yu PA-C at 24 1657       Attestation signed by Ada Bradley MD at 24 1801    I have reviewed this documentation and agree.                      [facility] Medicine Services  History & Physical    Patient Name: Ignacio Miranda  : 1945  MRN: [id number]  Primary Care Physician:  Maddie Huang APRN  Date of admission: 2024  Date and Time of Service: 2024 at 1700    Subjective      Chief Complaint: fall, back pain     History of Present Illness: Ignacio Miranda is a 79 y.o. male with a CMH of T2DM c/b neuropathy, h/o TIA, PVD, RLS, chronic back pain and h/o prostate CA who presented to [facility] on 2024 following fall. He reports that he was walking with walker when he tripped over his own feet and became weak in both legs which caused them to \"buckle underneath\" him and subsequently fell. He reports lowering to the floor but denies hitting head and LOC. He states he has had several falls over the past week with 2-3 falls per day. Wife at bedside states that she is then unable to get patient up. He reports worsening of chronic lower back pain when lying down but is relieved when sitting up or standing. He states that he also has difficulties detecting floor due to baseline neuropathy. Denies chest pain, dizziness, shortness of breath, cough, abdominal pain, dysuria, fever and chills. Family is amenable to rehab if needed.     On ED evaluation, patient HDS, AF. Labs were grossly unremarkable. UA not suspicious for UTI. CTH with no acute findings. CT lumbar spine with advanced multi level DDD with varying degrees of canal/neural foraminal stenosis. Hospitalist service to admit for further management, PT/OT eval and possible placement (SNF vas ECF).      Review of Systems   Constitutional:  Negative for chills and fever.   Respiratory:  Negative for shortness of breath.    Cardiovascular:  Negative for chest pain. "   Gastrointestinal:  Negative for abdominal pain, nausea and vomiting.   Musculoskeletal:  Positive for back pain and gait problem.   Neurological:  Positive for weakness. Negative for dizziness.   All other systems reviewed and are negative.        Personal History     Past Medical History:   Diagnosis Date    Diabetes mellitus 2001    Type 2    Diabetic peripheral neuropathy 2/22/2022    Essential hypertension 2/22/2022    Iron deficiency anemia 7/24/2020    Mild depression 2/22/2022    Peripheral vascular disease 2/23/2022    Restless leg syndrome 2/22/2022    Type 2 diabetes mellitus with hyperglycemia 2/22/2022       No past surgical history on file.    Family History: family history includes No Known Problems in his father and mother. Otherwise pertinent FHx was reviewed and not pertinent to current issue.    Social History:  reports that he has never smoked. He does not have any smokeless tobacco history on file. He reports that he does not currently use alcohol. He reports that he does not use drugs.    Home Medications:  Prior to Admission Medications       Prescriptions Last Dose Informant Patient Reported? Taking?    Accu-Chek Guide test strip   No No    1 each by Other route 3 (Three) Times a Day Before Meals. Dx: E11.65. Use as instructed    Accu-Chek Softclix Lancets lancets   No No    1 each by Other route Daily. Use as instructed    atorvastatin (LIPITOR) 20 MG tablet   Yes No    clobetasol (TEMOVATE) 0.05 % cream   Yes No    APPLY A THIN LAYER OF CREAM TOPICALLY TO AFFECTED AREA(S) ON SKIN TWICE DAILY AS NEEDED FOR 14 DAYS AND STOP FOR A WEEK, AVOID FACE AND GROIN, REPEAT AS NEEDED    cyanocobalamin (VITAMIN B-12) 1000 MCG tablet   Yes No    Diclofenac Sodium (VOLTAREN) 1 % gel gel   Yes No    Droplet Pen Needles 31G X 8 MM misc   Yes No    DULoxetine (CYMBALTA) 60 MG capsule  Spouse/Significant Other Yes No    Take 60 mg by mouth Daily.    ferrous sulfate 325 (65 FE) MG tablet   Yes No    Fiasp  FlexTouch 100 UNIT/ML solution pen-injector   Yes No    gabapentin (NEURONTIN) 600 MG tablet  Spouse/Significant Other Yes No    Take 600 mg by mouth 3 (Three) Times a Day.    Insulin Glargine (LANTUS SOLOSTAR) 100 UNIT/ML injection pen   No No    Inject 10 Units under the skin into the appropriate area as directed Every Night for 30 days.    Isopropyl Alcohol (Alcohol Wipes) 70 % misc   No No    Apply 1 each topically Daily.    ondansetron ODT (ZOFRAN-ODT) 4 MG disintegrating tablet   Yes No    pioglitazone (ACTOS) 15 MG tablet  Spouse/Significant Other Yes No    Take 15 mg by mouth Daily.    pioglitazone (ACTOS) 30 MG tablet   Yes No    rOPINIRole (REQUIP) 4 MG tablet   Yes No    Take 4 mg by mouth Daily.              Allergies:  No Known Allergies    Objective      Vitals:   Temp:  [97.9 °F (36.6 °C)] 97.9 °F (36.6 °C)  Heart Rate:  [] 96  Resp:  [20] 20  BP: (113-138)/(65-84) 128/70  Body mass index is 37.77 kg/m².    Physical Exam  General: 80 yo WM, Alert and oriented, obese, no acute distress.  HENT: Normocephalic, normal hearing, moist oral mucosa, no scleral icterus.  Neck: Supple, non-tender, no carotid bruits, no JVD, no LAD.  Lungs: Clear to auscultation, non-labored respiration.  Heart: RRR, no murmur, gallop or edema.  Abdomen: Soft, nontender, non-distended, + bowel sounds.  Musculoskeletal: No midline tenderness of L spine. Negative straight leg raise. Normal range of motion and strength, no tenderness or swelling.  Skin: Skin is warm, dry and pink, no rashes or lesions.  Psychiatric: Cooperative, appropriate mood and affect.  Neurologic: Equal strength bilaterally. No focal motor or sensory deficits appreciated. No facial droop or aphasia present.      Diagnostic Data:  Lab Results (last 24 hours)       Procedure Component Value Units Date/Time    Comprehensive Metabolic Panel [[id number]]  (Abnormal) Collected: 08/28/24 1358    Specimen: Blood Updated: 08/28/24 1500     Glucose 155 mg/dL       BUN 19 mg/dL      Creatinine 1.08 mg/dL      Sodium 139 mmol/L      Potassium 4.5 mmol/L      Comment: Slight hemolysis detected by analyzer. Result may be falsely elevated.        Chloride 100 mmol/L      CO2 29.3 mmol/L      Calcium 9.5 mg/dL      Total Protein 7.4 g/dL      Albumin 3.9 g/dL      ALT (SGPT) 14 U/L      AST (SGOT) 31 U/L      Comment: Slight hemolysis detected by analyzer. Result may be falsely elevated.        Alkaline Phosphatase 93 U/L      Total Bilirubin 0.7 mg/dL      Globulin 3.5 gm/dL      A/G Ratio 1.1 g/dL      BUN/Creatinine Ratio 17.6     Anion Gap 9.7 mmol/L      eGFR 69.8 mL/min/1.73     Narrative:      GFR Normal >60  Chronic Kidney Disease <60  Kidney Failure <15    The GFR formula is only valid for adults with stable renal function between ages 18 and 70.    Magnesium [[id number]]  (Normal) Collected: 08/28/24 1358    Specimen: Blood Updated: 08/28/24 1500     Magnesium 1.9 mg/dL     Single High Sensitivity Troponin T [[id number]]  (Normal) Collected: 08/28/24 1358    Specimen: Blood Updated: 08/28/24 1448     HS Troponin T 14 ng/L     Narrative:      High Sensitive Troponin T Reference Range:  <14.0 ng/L- Negative Female for AMI  <22.0 ng/L- Negative Male for AMI  >=14 - Abnormal Female indicating possible myocardial injury.  >=22 - Abnormal Male indicating possible myocardial injury.   Clinicians would have to utilize clinical acumen, EKG, Troponin, and serial changes to determine if it is an Acute Myocardial Infarction or myocardial injury due to an underlying chronic condition.         CK [[id number]]  (Normal) Collected: 08/28/24 1358    Specimen: Blood Updated: 08/28/24 1448     Creatine Kinase 55 U/L     TSH [[id number]]  (Normal) Collected: 08/28/24 1358    Specimen: Blood Updated: 08/28/24 1448     TSH 3.740 uIU/mL     Ammonia [[id number]]  (Normal) Collected: 08/28/24 1358    Specimen: Blood Updated: 08/28/24 1425     Ammonia 19 umol/L     Urinalysis, Microscopic Only - Urine,  Clean Catch [[id number]] Collected: 08/28/24 1401    Specimen: Urine, Clean Catch Updated: 08/28/24 1414     RBC, UA 0-2 /HPF      WBC, UA 0-2 /HPF      Comment: Urine culture not indicated.        Bacteria, UA None Seen /HPF      Squamous Epithelial Cells, UA 0-2 /HPF      Hyaline Casts, UA None Seen /LPF      Methodology Automated Microscopy    Urinalysis With Culture If Indicated - Urine, Clean Catch [[id number]]  (Abnormal) Collected: 08/28/24 1401    Specimen: Urine, Clean Catch Updated: 08/28/24 1412     Color, UA Dark Yellow     Appearance, UA Clear     pH, UA <=5.0     Specific Gravity, UA 1.022     Glucose, UA Negative     Ketones, UA Trace     Bilirubin, UA Negative     Blood, UA Negative     Protein, UA Negative     Leuk Esterase, UA Trace     Nitrite, UA Negative     Urobilinogen, UA 1.0 E.U./dL    Narrative:      In absence of clinical symptoms, the presence of pyuria, bacteria, and/or nitrites on the urinalysis result does not correlate with infection.    CBC & Differential [[id number]]  (Abnormal) Collected: 08/28/24 1358    Specimen: Blood Updated: 08/28/24 1410    Narrative:      The following orders were created for panel order CBC & Differential.  Procedure                               Abnormality         Status                     ---------                               -----------         ------                     CBC Auto Differential[[id number]]        Abnormal            Final result                 Please view results for these tests on the individual orders.    CBC Auto Differential [[id number]]  (Abnormal) Collected: 08/28/24 1358    Specimen: Blood Updated: 08/28/24 1410     WBC 9.05 10*3/mm3      RBC 3.82 10*6/mm3      Hemoglobin 12.0 g/dL      Hematocrit 37.6 %      MCV 98.4 fL      MCH 31.4 pg      MCHC 31.9 g/dL      RDW 14.2 %      RDW-SD 51.3 fl      MPV 11.0 fL      Platelets 175 10*3/mm3      Neutrophil % 57.7 %      Lymphocyte % 27.3 %      Monocyte % 11.5 %      Eosinophil % 2.7 %       Basophil % 0.6 %      Immature Grans % 0.2 %      Neutrophils, Absolute 5.23 10*3/mm3      Lymphocytes, Absolute 2.47 10*3/mm3      Monocytes, Absolute 1.04 10*3/mm3      Eosinophils, Absolute 0.24 10*3/mm3      Basophils, Absolute 0.05 10*3/mm3      Immature Grans, Absolute 0.02 10*3/mm3      nRBC 0.0 /100 WBC              Imaging Results (Last 24 Hours)       Procedure Component Value Units Date/Time    CT Lumbar Spine Without Contrast [[id number]] Collected: 08/28/24 1402     Updated: 08/28/24 1410    Narrative:      CT LUMBAR SPINE WO CONTRAST    Date of Exam: 8/28/2024 1:39 PM EDT    Indication: Midline low back pain after fall.    Comparison: CTA AIF 7/5/2023    Technique: Axial CT images were obtained of the lumbar spine without contrast administration.  Sagittal and coronal reconstructions were performed.  Automated exposure control and iterative reconstruction methods were used.      Findings:  12 degrees lumbar dextroscoliosis. Advanced diminished disc height eccentrically on the right at T12-L1 and L4-5. Advanced diminished disc height eccentrically on the left at L1-2, L2-3, L3-4. Multilevel anterolateral endplate marginal osteophyte   formation. No fracture is seen.    4 mm retrolisthesis L3 upon L4, 4 mm retrolisthesis L4 upon L5, unchanged from 7/5/2023. Chronic bilateral L5 spondylitic defects are again noted.    Uncomplicated cholelithiasis. Remainder of the imaged paraspinal soft tissues are within normal limits.      At T12-L1, mild posterior osteophyte formation. Moderate right facet arthropathy. No significant canal stenosis. Left neural foramen is patent. Mild right neural foraminal narrowing.    At L1-2, broad-based posterior disc osteophyte formation. Mild to moderate central canal stenosis. Moderate bilateral neural foraminal stenosis, greatest on the right.    At L2-3, broad-based posterior disc osteophyte formation. Mild bilateral facet arthropathy. Moderate to severe central canal  stenosis. Mild right neural foraminal narrowing. Moderate to severe left neural foraminal stenosis    At L3-4, broad-based posterior disc osteophyte formation with mild to moderate central canal stenosis. Mild bilateral facet arthropathy. Severe left neural foraminal stenosis. Mild right neural foraminal stenosis    At L4-5, broad-based posterior disc osteophyte formation is present. Moderate bilateral facet arthropathy. Mild canal stenosis. Severe right neural foraminal stenosis. Moderate to severe left neural foraminal stenosis.    At L5-S1, no significant disc bulge or canal stenosis. Mild bilateral facet arthropathy. Grade 1 anterolisthesis L5 upon S1 secondary to chronic pars defects. Severe bilateral neural foraminal stenosis.                Impression:      Impression:    1. Advanced degenerative changes in the lumbar spine.  2. No acute lumbar spine findings.  3. Please refer to the body the report for level by level findings        Electronically Signed: Caterina Delatorre MD    8/28/2024 2:08 PM EDT    Workstation ID: [id number]    CT Head Without Contrast [[id number]] Collected: 08/28/24 1358     Updated: 08/28/24 1402    Narrative:      CT HEAD WO CONTRAST    Date of Exam: 8/28/2024 1:39 PM EDT    Indication: Increasing weakness fell today.    Comparison: 2/22/2022    Technique: Axial CT images were obtained of the head without contrast administration.  Coronal reconstructions were performed.  Automated exposure control and iterative reconstruction methods were used.      Findings:  There is mild atrophy. Ventricular dilatation is compatible with atrophy and is stable. There is no acute mass effect or edema. There are no findings suspicious for acute CVA or hemorrhage. There is fluid in the sphenoid sinuses. The other visualized   paranasal sinuses appear clear. There is no evidence of a skull fracture.      Impression:      Impression:  No acute intracranial process. Evidence of acute sphenoid  sinusitis.      Electronically Signed: Taniya Rodriguez MD    8/28/2024 2:00 PM EDT    Workstation ID: [id number]              Assessment & Plan        This is a 79 y.o. male with:    Active and Resolved Problems  Active Hospital Problems    Diagnosis  POA    **Back pain [M54.9]  Yes      Resolved Hospital Problems   No resolved problems to display.     Generalized weakness/Deconditioning  Acute on chronic back pain   Recurrent falls  CTH, CT L spine with multi level DDD and varying degrees of canal/foraminal narrowing  Labs unremarkable. No leukocytosis  UA with no UTI  Prn pain medication   PT/OT to eval, will likely need SNF placement   Fall precautions  No need for neurosurgery consult, no acute radiculopathy symptoms    Diabetes Mellitus c/b neuropathy and retinopathy    Mod SSI   Consistent carb diet  Hypoglycemia protocol     Hyperlipidemia  Continue statin    H/o TIA  No residual deficits noted  Continue to monitor     RLS  Continue Requip    H/o prostate CA  S/p radiation   C/b urinary incontinence        VTE Prophylaxis:  Mechanical VTE prophylaxis orders are present.        The patient desires to be as follows:    CODE STATUS:    Code Status (Patient has no pulse and is not breathing): CPR (Attempt to Resuscitate)  Medical Interventions (Patient has pulse or is breathing): Full Support        Maxine Oates, who can be contacted at [phone], is the designated person to make medical decisions on the patient's behalf if He is incapable of doing so. This was clarified with patient and/or next of kin on 8/28/2024 during the course of this H&P.    Admission Status:  I believe this patient meets observation status.    Expected Length of Stay: < 24 hours    PDMP and Medication Dispenses via Sidebar reviewed and consistent with patient reported medications.    I discussed the patient's findings and my recommendations with patient.      Signature:     This document has been electronically signed by Sahra  ADRIANA Yu on 2024 16:57 EDT   [facility] Hospitalist Team      Electronically signed by Ada Bradley MD at 24 1801          Physician Progress Notes (last 24 hours)        Halie Tobias MD at 24 1041              [facility] MEDICINE SERVICE  DAILY PROGRESS NOTE    NAME: Ignacio Miranda  : 1945  MRN: [id number]      LOS: 3 days     PROVIDER OF SERVICE: Halie Tobias MD    Chief Complaint: Back pain    Subjective:     Interval History:  History taken from: Patient and patient's chart   Patient Complaints: denies any new complaints  Patient Denies:  chest pain , nausea and vomiting    Review of Systems:   Review of Systems  All negative except above   Objective:     Vital Signs  Temp:  [97.8 °F (36.6 °C)-98.9 °F (37.2 °C)] 98.4 °F (36.9 °C)  Heart Rate:  [] 106  Resp:  [12-16] 12  BP: (118-137)/(67-78) 118/67   Body mass index is 35.39 kg/m².    Physical Exam  General: Alert and oriented, no acute distress.  HENT: Normocephalic, moist oral mucosa, no scleral icterus.  Neck: Supple, non-tender, no carotid bruits, no JVD, no LAD.  Lungs: Clear to auscultation, non-labored respiration.  Heart: RRR, no murmur, gallop or edema.  Abdomen: Soft, nontender, non-distended, + bowel sounds.  Musculoskeletal: Normal range of motion and strength, no tenderness or swelling.  Neuro: alert and awake, moving all 4 extremities   Skin: Skin is warm, dry and pink, no rashes or lesions.  Psychiatric: Cooperative, appropriate mood and affect.        Scheduled Meds   atorvastatin, 20 mg, Oral, Nightly  gabapentin, 600 mg, Oral, Q12H  [START ON 2024] insulin glargine, 20 Units, Subcutaneous, QAM  insulin lispro, 2-9 Units, Subcutaneous, 4x Daily AC & at Bedtime  insulin lispro, 7 Units, Subcutaneous, TID With Meals  multivitamin with minerals, 1 tablet, Oral, Daily       PRN Meds     acetaminophen    calcium carbonate    Calcium Replacement - Follow Nurse / BPA  Driven Protocol    dextrose    dextrose    dextrose    glucagon (human recombinant)    HYDROcodone-acetaminophen    Magnesium Standard Dose Replacement - Follow Nurse / BPA Driven Protocol    melatonin    ondansetron ODT    oxyCODONE    Phosphorus Replacement - Follow Nurse / BPA Driven Protocol    Potassium Replacement - Follow Nurse / BPA Driven Protocol   Infusions         Diagnostic Data    Results from last 7 days   Lab Units 08/29/24  0321 08/28/24  1358   WBC 10*3/mm3 8.43 9.05   HEMOGLOBIN g/dL 12.3* 12.0*   HEMATOCRIT % 39.5 37.6   PLATELETS 10*3/mm3 158 175   GLUCOSE mg/dL 163* 155*   CREATININE mg/dL 1.02 1.08   BUN mg/dL 15 19   SODIUM mmol/L 140 139   POTASSIUM mmol/L 4.5 4.5   AST (SGOT) U/L  --  31   ALT (SGPT) U/L  --  14   ALK PHOS U/L  --  93   BILIRUBIN mg/dL  --  0.7   ANION GAP mmol/L 10.0 9.7       No radiology results for the last day      I have reviewed patient labs and imaging     Assessment/Plan:     Active and Resolved Problems  Generalized weakness/Deconditioning  Acute on chronic back pain   Recurrent falls  CTH, CT L spine with multi level DDD and varying degrees of canal/foraminal narrowing  Labs unremarkable. No leukocytosis  UA with no UTI  Prn pain medication   PT/OT to eval, recommended SNF placement   Fall precautions  No need for neurosurgery consult, no acute radiculopathy symptoms     Diabetes Mellitus c/b neuropathy and retinopathy    Insulin glargine 15-->20 units qam  Insulin lispro 5-->7 Units TID with meal   Consistent carb diet  Hypoglycemia protocol      Hyperlipidemia  Continue statin     H/o TIA  No residual deficits noted  Continue to monitor      RLS  Continue Requip     H/o prostate CA  S/p radiation   C/b urinary incontinence    VTE Prophylaxis:  Mechanical VTE prophylaxis orders are present.         Code status is   Code Status and Medical Interventions: CPR (Attempt to Resuscitate); Full Support   Ordered at: 08/28/24 9561     Code Status (Patient has no  "pulse and is not breathing):    CPR (Attempt to Resuscitate)     Medical Interventions (Patient has pulse or is breathing):    Full Support       Plan for disposition:1-2 days pending SNF placement , last facility assigned has a covid outbreak. Patient is requesting to transfer to another facility. Case management updated.     Time: 30 minutes    Signature: Electronically signed by Halie Tobias MD, 24, 10:41 EDT.  [facility] Hospitalist Team    Electronically signed by Halie Tobias MD at 24 1043          Physical Therapy Notes (most recent note)        Guero Leo PTA at 24 1624  Version 1 of 1         Assessment: Ignacio Miranda presents with functional mobility impairments which indicate the need for skilled intervention. Tolerating session today without incident. Will continue to follow and progress as tolerated.     Plan/Recommendations:   If medically appropriate, Moderate Intensity Therapy recommended post-acute care. This is recommended as therapy feels the patient would require 3-4 days per week and wouldn't tolerate \"3 hour daily\" rehab intensity. SNF would be the preferred choice. If the patient does not agree to SNF, arrange HH or OP depending on home bound status. If patient is medically complex, consider LTACH. Pt requires no DME at discharge.     Pt desires Skilled Rehab placement at discharge. Pt cooperative; agreeable to therapeutic recommendations and plan of care.       Electronically signed by Guero Leo PTA at 24 1624          Occupational Therapy Notes (most recent note)        Samuel Wilson OT at 24 1605          Patient Name: Ignacio Miranda  : 1945    MRN: [id number]                              Today's Date: 2024       Admit Date: 2024    Visit Dx:     ICD-10-CM ICD-9-CM   1. Near syncope  R55 780.2   2. Weakness  R53.1 780.79   3. Ataxia  R27.0 781.3   4. Acute bilateral low back pain " without sciatica  M54.50 724.2     338.19   5. Acute non-recurrent sphenoidal sinusitis  J01.30 461.3   6. Lumbar degenerative disc disease  M51.36 722.52   7. Spondylosis of lumbar region without myelopathy or radiculopathy  M47.816 721.3   8. Central stenosis of spinal canal  M48.00 724.00   9. Neuroforaminal stenosis of lumbosacral spine  M48.07 724.03   10. Type 2 diabetes mellitus with diabetic polyneuropathy, unspecified whether long term insulin use  E11.42 250.60     357.2     Patient Active Problem List   Diagnosis    Iron deficiency anemia    Normocytic anemia    Essential hypertension    Type 2 diabetes mellitus with hyperglycemia    Diabetic peripheral neuropathy    Mild depression    Restless leg syndrome    Peripheral vascular disease    Back pain    Near syncope     Past Medical History:   Diagnosis Date    Diabetes mellitus 2001    Type 2    Diabetic peripheral neuropathy 2/22/2022    Essential hypertension 2/22/2022    Iron deficiency anemia 7/24/2020    Mild depression 2/22/2022    Peripheral vascular disease 2/23/2022    Restless leg syndrome 2/22/2022    Type 2 diabetes mellitus with hyperglycemia 2/22/2022     History reviewed. No pertinent surgical history.   General Information       Row Name 08/29/24 1548          OT Time and Intention    Document Type evaluation  -MP     Mode of Treatment occupational therapy  -MP       Row Name 08/29/24 1548          General Information    Patient Profile Reviewed yes  -MP     Prior Level of Function min assist:;mod assist:;ADL's  -MP       Row Name 08/29/24 1548          Living Environment    People in Home spouse  -MP       Row Name 08/29/24 1548          Cognition    Orientation Status (Cognition) oriented x 4  -MP       Row Name 08/29/24 1548          Safety Issues, Functional Mobility    Impairments Affecting Function (Mobility) balance;endurance/activity tolerance;strength;sensation/sensory awareness  -MP               User Key  (r) = Recorded By, (t)  = Taken By, (c) = Cosigned By      Initials Name Provider Type    Samuel Pat OT Occupational Therapist                     Mobility/ADL's       Row Name 08/29/24 1550          Bed-Chair Transfer    Bed-Chair [place] (Transfers) minimum assist (75% patient effort)  -MP     Assistive Device (Bed-Chair Transfers) walker, front-wheeled  -MP       Row Name 08/29/24 1550          Sit-Stand Transfer    Sit-Stand [place] (Transfers) minimum assist (75% patient effort)  -MP     Assistive Device (Sit-Stand Transfers) walker, front-wheeled  -MP       Row Name 08/29/24 1550          Activities of Daily Living    BADL Assessment/Intervention lower body dressing  -MP       Row Name 08/29/24 1550          Lower Body Dressing Assessment/Training    [place] Level (Lower Body Dressing) lower body dressing skills;maximum assist (25% patient effort)  -MP               User Key  (r) = Recorded By, (t) = Taken By, (c) = Cosigned By      Initials Name Provider Type    Samuel Pat OT Occupational Therapist                   Obj/Interventions       Row Name 08/29/24 1552          Range of Motion Comprehensive    Comment, General Range of Motion BUE WFL  -MP       Row Name 08/29/24 1552          Strength Comprehensive (MMT)    Comment, General Manual Muscle Testing (MMT) Assessment BUE 4/5  -MP       Row Name 08/29/24 1552          Balance    Balance Interventions sitting;standing;sit to stand;supported;static;dynamic  -MP               User Key  (r) = Recorded By, (t) = Taken By, (c) = Cosigned By      Initials Name Provider Type    Samuel Pat OT Occupational Therapist                   Goals/Plan       Row Name 08/29/24 1600          Bed Mobility Goal 1 (OT)    Activity/Assistive Device (Bed Mobility Goal 1, OT) bed mobility activities, all  -MP     [place] Level/Cues Needed (Bed Mobility Goal 1, OT) supervision required  -MP     Time Frame (Bed Mobility Goal 1, OT) long term goal  "(LTG);2 weeks  -MP       Row Name 08/29/24 1600          Transfer Goal 1 (OT)    Activity/Assistive Device (Transfer Goal 1, OT) transfers, all  -MP     [place] Level/Cues Needed (Transfer Goal 1, OT) contact guard required  -MP     Time Frame (Transfer Goal 1, OT) long term goal (LTG);2 weeks  -MP       Row Name 08/29/24 1600          Dressing Goal 1 (OT)    Activity/Device (Dressing Goal 1, OT) lower body dressing  -MP     [place]/Cues Needed (Dressing Goal 1, OT) minimum assist (75% or more patient effort)  -MP     Time Frame (Dressing Goal 1, OT) long term goal (LTG);2 weeks  -MP               User Key  (r) = Recorded By, (t) = Taken By, (c) = Cosigned By      Initials Name Provider Type    Samuel Pat, FELICIA Occupational Therapist                   Clinical Impression       Row Name 08/29/24 9735          Pain Assessment    Pretreatment Pain Rating 3/10  -MP     Posttreatment Pain Rating 5/10  -MP     Pain Location - Side/Orientation Left  -MP     Pain Location - knee  -MP       Row Name 08/29/24 0385          Plan of Care Review    Plan of Care Reviewed With patient  -MP     Progress no change  -MP     Outcome Evaluation Pt. is a 79 y.o. male with a CMH of T2DM c/b neuropathy, h/o TIA, PVD, RLS, chronic back pain and h/o prostate CA who presented to [facility] on 8/28/2024 following fall. He reports that he was walking with walker when he tripped over his own feet and became weak in both legs which caused them to \"buckle underneath\" him and subsequently fell. Imaging (-) acute process. Pt. states he lives at home w/ spouse who helps him w/ dressing/bathing ADLs, also cites incontinence at night. Pt. reveals that spouse also having difficulty at home. Pt. requires min A for functional transfers this date, ambulating w/ rolling walker support limited secondary to knee pain. Pt. provided max A for all LB ADLs. Not safe for d/c home at this time and will require SNF placement to address " aforementioned deficits.  -MP       Row Name 08/29/24 1556          Therapy Assessment/Plan (OT)    Rehab Potential (OT) good, to achieve stated therapy goals  -MP     Criteria for Skilled Therapeutic Interventions Met (OT) yes;meets criteria;skilled treatment is necessary  -MP     Therapy Frequency (OT) 3 times/wk  -MP       Row Name 08/29/24 1556          Therapy Plan Review/Discharge Plan (OT)    Anticipated Discharge Disposition (OT) [facility] nursing [facility]  -       Row Name 08/29/24 1556          Vital Signs    Pre Patient Position Supine  -MP     Intra Patient Position Standing  -MP     Post Patient Position Sitting  -MP       Row Name 08/29/24 1556          Positioning and Restraints    Pre-Treatment Position in bed  -MP     Post Treatment Position chair  -MP     In Chair sitting;call light within reach;encouraged to call for assist;exit alarm on  -MP               User Key  (r) = Recorded By, (t) = Taken By, (c) = Cosigned By      Initials Name Provider Type    Samuel Pat, OT Occupational Therapist                   Outcome Measures       Row Name 08/29/24 1417 08/29/24 1200       How much help from another person do you currently need...    Turning from your back to your side while in flat bed without using bedrails? 3  -RR 3  -KD    Moving from lying on back to sitting on the side of a flat bed without bedrails? 3  -RR 2  -KD    Moving to and from a bed to a chair (including a wheelchair)? 3  -RR 2  -KD    Standing up from a chair using your arms (e.g., wheelchair, bedside chair)? 2  -RR 2  -KD    Climbing 3-5 steps with a railing? 2  -RR 2  -KD    To walk in hospital room? 2  -RR 2  -KD    AM-PAC 6 Clicks Score (PT) 15  -RR 13  -KD    Highest Level of Mobility Goal 4 --> Transfer to chair/commode  -RR 4 --> Transfer to chair/commode  -KD      Row Name 08/29/24 0800          How much help from another person do you currently need...    Turning from your back to your side while in flat bed  without using bedrails? 3  -KD     Moving from lying on back to sitting on the side of a flat bed without bedrails? 2  -KD     Moving to and from a bed to a chair (including a wheelchair)? 2  -KD     Standing up from a chair using your arms (e.g., wheelchair, bedside chair)? 2  -KD     Climbing 3-5 steps with a railing? 2  -KD     To walk in hospital room? 2  -KD     AM-PAC 6 Clicks Score (PT) 13  -KD     Highest Level of Mobility Goal 4 --> Transfer to chair/commode  -KD               User Key  (r) = Recorded By, (t) = Taken By, (c) = Cosigned By      Initials Name Provider Type    Bri Tompkins, RN Registered Nurse    Luzma Muir, PT Physical Therapist                    Occupational Therapy Education       Title: PT OT SLP Therapies (In Progress)       Topic: Occupational Therapy (In Progress)       Point: ADL training (Not Started)       Description:   Instruct learner(s) on proper safety adaptation and remediation techniques during self care or transfers.   Instruct in proper use of assistive devices.                  Learner Progress:  Not documented in this visit.              Point: Home exercise program (Not Started)       Description:   Instruct learner(s) on appropriate technique for monitoring, assisting and/or progressing therapeutic exercises/activities.                  Learner Progress:  Not documented in this visit.              Point: Precautions (Not Started)       Description:   Instruct learner(s) on prescribed precautions during self-care and functional transfers.                  Learner Progress:  Not documented in this visit.              Point: Body mechanics (Done)       Description:   Instruct learner(s) on proper positioning and spine alignment during self-care, functional mobility activities and/or exercises.                  Learning Progress Summary             Patient Acceptance, E,TB, VU by INGRIS at 8/29/2024 1600                                         User Key        "Initials Effective Dates Name Provider Type Discipline     06/16/21 -  Samuel Wilson OT Occupational Therapist OT                  OT Recommendation and Plan  Therapy Frequency (OT): 3 times/wk  Plan of Care Review  Plan of Care Reviewed With: patient  Progress: no change  Outcome Evaluation: Pt. is a 79 y.o. male with a CMH of T2DM c/b neuropathy, h/o TIA, PVD, RLS, chronic back pain and h/o prostate CA who presented to [facility] on 8/28/2024 following fall. He reports that he was walking with walker when he tripped over his own feet and became weak in both legs which caused them to \"buckle underneath\" him and subsequently fell. Imaging (-) acute process. Pt. states he lives at home w/ spouse who helps him w/ dressing/bathing ADLs, also cites incontinence at night. Pt. reveals that spouse also having difficulty at home. Pt. requires min A for functional transfers this date, ambulating w/ rolling walker support limited secondary to knee pain. Pt. provided max A for all LB ADLs. Not safe for d/c home at this time and will require SNF placement to address aforementioned deficits.     Time Calculation:         Time Calculation- OT       Row Name 08/29/24 1600             Time Calculation- OT    OT Start Time 0950  -MP      OT Stop Time 1015  -      OT Time Calculation (min) 25 min  -MP      Total Timed Code Minutes- OT 0 minute(s)  -      OT Received On 08/29/24  -      OT - Next Appointment 08/30/24  -      OT Goal Re-Cert Due Date 09/12/24  -                User Key  (r) = Recorded By, (t) = Taken By, (c) = Cosigned By      Initials Name Provider Type    MP Samuel Wilson OT Occupational Therapist                  Therapy Charges for Today       Code Description Service Date Service Provider Modifiers Qty    [id number] HC OT EVAL LOW COMPLEXITY 4 8/29/2024 Samuel Wilson OT GO 1                 Samuel Wilson OT  8/29/2024    Electronically signed by Samuel Wilson OT " at 08/29/24 1602

## 2024-09-01 NOTE — PLAN OF CARE
Problem: Adult Inpatient Plan of Care  Goal: Plan of Care Review  Outcome: Ongoing, Progressing  Goal: Patient-Specific Goal (Individualized)  Outcome: Ongoing, Progressing  Goal: Absence of Hospital-Acquired Illness or Injury  Outcome: Ongoing, Progressing  Intervention: Identify and Manage Fall Risk  Recent Flowsheet Documentation  Taken 9/1/2024 1212 by Diana Allison RN  Safety Promotion/Fall Prevention:   assistive device/personal items within reach   clutter free environment maintained   fall prevention program maintained   nonskid shoes/slippers when out of bed   room organization consistent   safety round/check completed  Taken 9/1/2024 1200 by Diana Allison RN  Safety Promotion/Fall Prevention: safety round/check completed  Taken 9/1/2024 1000 by Diana Allison RN  Safety Promotion/Fall Prevention: safety round/check completed  Taken 9/1/2024 0800 by Diana Allison RN  Safety Promotion/Fall Prevention:   assistive device/personal items within reach   clutter free environment maintained   fall prevention program maintained   nonskid shoes/slippers when out of bed   room organization consistent   safety round/check completed  Intervention: Prevent Skin Injury  Recent Flowsheet Documentation  Taken 9/1/2024 1212 by Diana Allison RN  Body Position: position changed independently  Skin Protection: adhesive use limited  Taken 9/1/2024 0800 by Diana Allison RN  Body Position: position changed independently  Intervention: Prevent and Manage VTE (Venous Thromboembolism) Risk  Recent Flowsheet Documentation  Taken 9/1/2024 1212 by Diana Allison RN  Activity Management: activity encouraged  Taken 9/1/2024 0800 by Diana Allison RN  Activity Management: activity encouraged  Intervention: Prevent Infection  Recent Flowsheet Documentation  Taken 9/1/2024 1212 by Diana Allison RN  Infection Prevention: hand hygiene promoted  Taken 9/1/2024 0800 by Diana Allison RN  Infection  Prevention: hand hygiene promoted  Goal: Optimal Comfort and Wellbeing  Outcome: Ongoing, Progressing  Goal: Readiness for Transition of Care  Outcome: Ongoing, Progressing   Goal Outcome Evaluation:      Patient did not have any new tests or procedures. The patient remains on room air and did not have any complaints of pain. The patient is being discharged to rehab. Will continue to monitor.

## 2024-09-22 ENCOUNTER — TELEPHONE (OUTPATIENT)
Dept: DIABETES SERVICES | Facility: HOSPITAL | Age: 79
End: 2024-09-22
Payer: MEDICARE

## 2025-03-25 ENCOUNTER — HOSPITAL ENCOUNTER (OUTPATIENT)
Facility: HOSPITAL | Age: 80
Setting detail: OBSERVATION
Discharge: HOME | End: 2025-03-27
Attending: EMERGENCY MEDICINE | Admitting: EMERGENCY MEDICINE
Payer: MEDICARE

## 2025-03-25 ENCOUNTER — APPOINTMENT (OUTPATIENT)
Dept: CARDIOLOGY | Facility: HOSPITAL | Age: 80
End: 2025-03-25
Payer: MEDICARE

## 2025-03-25 DIAGNOSIS — E11.42 DIABETIC POLYNEUROPATHY ASSOCIATED WITH TYPE 2 DIABETES MELLITUS: ICD-10-CM

## 2025-03-25 DIAGNOSIS — M79.605 LEFT LEG PAIN: Primary | ICD-10-CM

## 2025-03-25 LAB
ALBUMIN SERPL-MCNC: 4 G/DL (ref 3.5–5.2)
ALBUMIN/GLOB SERPL: 1.1 G/DL
ALP SERPL-CCNC: 113 U/L (ref 39–117)
ALT SERPL W P-5'-P-CCNC: 16 U/L (ref 1–41)
ANION GAP SERPL CALCULATED.3IONS-SCNC: 13.7 MMOL/L (ref 5–15)
AST SERPL-CCNC: 25 U/L (ref 1–40)
BASOPHILS # BLD AUTO: 0.05 10*3/MM3 (ref 0–0.2)
BASOPHILS NFR BLD AUTO: 0.6 % (ref 0–1.5)
BH CV LOWER VASCULAR LEFT COMMON FEMORAL AUGMENT: NORMAL
BH CV LOWER VASCULAR LEFT COMMON FEMORAL COMPETENT: NORMAL
BH CV LOWER VASCULAR LEFT COMMON FEMORAL COMPRESS: NORMAL
BH CV LOWER VASCULAR LEFT COMMON FEMORAL PHASIC: NORMAL
BH CV LOWER VASCULAR LEFT COMMON FEMORAL SPONT: NORMAL
BH CV LOWER VASCULAR LEFT DISTAL FEMORAL COMPRESS: NORMAL
BH CV LOWER VASCULAR LEFT GASTRONEMIUS COMPRESS: NORMAL
BH CV LOWER VASCULAR LEFT GREATER SAPH AK COMPRESS: NORMAL
BH CV LOWER VASCULAR LEFT GREATER SAPH BK COMPRESS: NORMAL
BH CV LOWER VASCULAR LEFT LESSER SAPH COMPRESS: NORMAL
BH CV LOWER VASCULAR LEFT MID FEMORAL AUGMENT: NORMAL
BH CV LOWER VASCULAR LEFT MID FEMORAL COMPETENT: NORMAL
BH CV LOWER VASCULAR LEFT MID FEMORAL COMPRESS: NORMAL
BH CV LOWER VASCULAR LEFT MID FEMORAL PHASIC: NORMAL
BH CV LOWER VASCULAR LEFT MID FEMORAL SPONT: NORMAL
BH CV LOWER VASCULAR LEFT PERONEAL COMPRESS: NORMAL
BH CV LOWER VASCULAR LEFT POPLITEAL AUGMENT: NORMAL
BH CV LOWER VASCULAR LEFT POPLITEAL COMPETENT: NORMAL
BH CV LOWER VASCULAR LEFT POPLITEAL COMPRESS: NORMAL
BH CV LOWER VASCULAR LEFT POPLITEAL PHASIC: NORMAL
BH CV LOWER VASCULAR LEFT POPLITEAL SPONT: NORMAL
BH CV LOWER VASCULAR LEFT POSTERIOR TIBIAL COMPRESS: NORMAL
BH CV LOWER VASCULAR LEFT PROFUNDA FEMORAL COMPRESS: NORMAL
BH CV LOWER VASCULAR LEFT PROXIMAL FEMORAL COMPRESS: NORMAL
BH CV LOWER VASCULAR LEFT SAPHENOFEMORAL JUNCTION COMPRESS: NORMAL
BH CV LOWER VASCULAR RIGHT COMMON FEMORAL AUGMENT: NORMAL
BH CV LOWER VASCULAR RIGHT COMMON FEMORAL COMPETENT: NORMAL
BH CV LOWER VASCULAR RIGHT COMMON FEMORAL COMPRESS: NORMAL
BH CV LOWER VASCULAR RIGHT COMMON FEMORAL PHASIC: NORMAL
BH CV LOWER VASCULAR RIGHT COMMON FEMORAL SPONT: NORMAL
BH CV POP FLUID COLLECT LEFT: 1
BH CV VAS PRELIMINARY FINDINGS SCRIPTING: 1
BILIRUB SERPL-MCNC: 0.3 MG/DL (ref 0–1.2)
BUN SERPL-MCNC: 13 MG/DL (ref 8–23)
BUN/CREAT SERPL: 11.7 (ref 7–25)
CALCIUM SPEC-SCNC: 9.6 MG/DL (ref 8.6–10.5)
CHLORIDE SERPL-SCNC: 100 MMOL/L (ref 98–107)
CO2 SERPL-SCNC: 26.3 MMOL/L (ref 22–29)
CREAT SERPL-MCNC: 1.11 MG/DL (ref 0.76–1.27)
DEPRECATED RDW RBC AUTO: 52.2 FL (ref 37–54)
EGFRCR SERPLBLD CKD-EPI 2021: 67.5 ML/MIN/1.73
EOSINOPHIL # BLD AUTO: 0.23 10*3/MM3 (ref 0–0.4)
EOSINOPHIL NFR BLD AUTO: 2.7 % (ref 0.3–6.2)
ERYTHROCYTE [DISTWIDTH] IN BLOOD BY AUTOMATED COUNT: 14.5 % (ref 12.3–15.4)
GEN 5 1HR TROPONIN T REFLEX: 12 NG/L
GLOBULIN UR ELPH-MCNC: 3.6 GM/DL
GLUCOSE SERPL-MCNC: 200 MG/DL (ref 65–99)
HCT VFR BLD AUTO: 42.2 % (ref 37.5–51)
HGB BLD-MCNC: 13 G/DL (ref 13–17.7)
HOLD SPECIMEN: NORMAL
IMM GRANULOCYTES # BLD AUTO: 0.03 10*3/MM3 (ref 0–0.05)
IMM GRANULOCYTES NFR BLD AUTO: 0.4 % (ref 0–0.5)
LYMPHOCYTES # BLD AUTO: 1.66 10*3/MM3 (ref 0.7–3.1)
LYMPHOCYTES NFR BLD AUTO: 19.8 % (ref 19.6–45.3)
MCH RBC QN AUTO: 29.9 PG (ref 26.6–33)
MCHC RBC AUTO-ENTMCNC: 30.8 G/DL (ref 31.5–35.7)
MCV RBC AUTO: 97 FL (ref 79–97)
MONOCYTES # BLD AUTO: 0.88 10*3/MM3 (ref 0.1–0.9)
MONOCYTES NFR BLD AUTO: 10.5 % (ref 5–12)
NEUTROPHILS NFR BLD AUTO: 5.53 10*3/MM3 (ref 1.7–7)
NEUTROPHILS NFR BLD AUTO: 66 % (ref 42.7–76)
NRBC BLD AUTO-RTO: 0 /100 WBC (ref 0–0.2)
PLATELET # BLD AUTO: 191 10*3/MM3 (ref 140–450)
PMV BLD AUTO: 11.1 FL (ref 6–12)
POTASSIUM SERPL-SCNC: 3.8 MMOL/L (ref 3.5–5.2)
PROT SERPL-MCNC: 7.6 G/DL (ref 6–8.5)
RBC # BLD AUTO: 4.35 10*6/MM3 (ref 4.14–5.8)
SODIUM SERPL-SCNC: 140 MMOL/L (ref 136–145)
TROPONIN T NUMERIC DELTA: 1 NG/L
TROPONIN T SERPL HS-MCNC: 11 NG/L
WBC NRBC COR # BLD AUTO: 8.38 10*3/MM3 (ref 3.4–10.8)
WHOLE BLOOD HOLD COAG: NORMAL

## 2025-03-25 PROCEDURE — 25010000002 ENOXAPARIN PER 10 MG: Performed by: NURSE PRACTITIONER

## 2025-03-25 PROCEDURE — 99285 EMERGENCY DEPT VISIT HI MDM: CPT

## 2025-03-25 PROCEDURE — 84484 ASSAY OF TROPONIN QUANT: CPT | Performed by: NURSE PRACTITIONER

## 2025-03-25 PROCEDURE — 80053 COMPREHEN METABOLIC PANEL: CPT | Performed by: NURSE PRACTITIONER

## 2025-03-25 PROCEDURE — G0378 HOSPITAL OBSERVATION PER HR: HCPCS

## 2025-03-25 PROCEDURE — 36415 COLL VENOUS BLD VENIPUNCTURE: CPT

## 2025-03-25 PROCEDURE — 85025 COMPLETE CBC W/AUTO DIFF WBC: CPT | Performed by: NURSE PRACTITIONER

## 2025-03-25 PROCEDURE — 96372 THER/PROPH/DIAG INJ SC/IM: CPT

## 2025-03-25 PROCEDURE — 93005 ELECTROCARDIOGRAM TRACING: CPT | Performed by: NURSE PRACTITIONER

## 2025-03-25 PROCEDURE — 93971 EXTREMITY STUDY: CPT

## 2025-03-25 PROCEDURE — 93971 EXTREMITY STUDY: CPT | Performed by: SURGERY

## 2025-03-25 RX ORDER — SODIUM CHLORIDE 0.9 % (FLUSH) 0.9 %
10 SYRINGE (ML) INJECTION AS NEEDED
Status: DISCONTINUED | OUTPATIENT
Start: 2025-03-25 | End: 2025-03-27 | Stop reason: HOSPADM

## 2025-03-25 RX ORDER — SODIUM CHLORIDE 0.9 % (FLUSH) 0.9 %
10 SYRINGE (ML) INJECTION EVERY 12 HOURS SCHEDULED
Status: DISCONTINUED | OUTPATIENT
Start: 2025-03-25 | End: 2025-03-27 | Stop reason: HOSPADM

## 2025-03-25 RX ORDER — GABAPENTIN 300 MG/1
600 CAPSULE ORAL ONCE
Status: COMPLETED | OUTPATIENT
Start: 2025-03-25 | End: 2025-03-25

## 2025-03-25 RX ORDER — BISACODYL 5 MG/1
5 TABLET, DELAYED RELEASE ORAL DAILY PRN
Status: DISCONTINUED | OUTPATIENT
Start: 2025-03-25 | End: 2025-03-27 | Stop reason: HOSPADM

## 2025-03-25 RX ORDER — ENOXAPARIN SODIUM 100 MG/ML
40 INJECTION SUBCUTANEOUS DAILY
Status: DISCONTINUED | OUTPATIENT
Start: 2025-03-25 | End: 2025-03-27 | Stop reason: HOSPADM

## 2025-03-25 RX ORDER — MELOXICAM 15 MG/1
7.5 TABLET ORAL DAILY
Status: DISCONTINUED | OUTPATIENT
Start: 2025-03-25 | End: 2025-03-27 | Stop reason: HOSPADM

## 2025-03-25 RX ORDER — BISACODYL 10 MG
10 SUPPOSITORY, RECTAL RECTAL DAILY PRN
Status: DISCONTINUED | OUTPATIENT
Start: 2025-03-25 | End: 2025-03-27 | Stop reason: HOSPADM

## 2025-03-25 RX ORDER — POLYETHYLENE GLYCOL 3350 17 G/17G
17 POWDER, FOR SOLUTION ORAL DAILY PRN
Status: DISCONTINUED | OUTPATIENT
Start: 2025-03-25 | End: 2025-03-27 | Stop reason: HOSPADM

## 2025-03-25 RX ORDER — INSULIN ASPART INJECTION 100 [IU]/ML
30 INJECTION, SOLUTION SUBCUTANEOUS
COMMUNITY

## 2025-03-25 RX ORDER — HYDROCODONE BITARTRATE AND ACETAMINOPHEN 5; 325 MG/1; MG/1
1 TABLET ORAL ONCE
Refills: 0 | Status: COMPLETED | OUTPATIENT
Start: 2025-03-25 | End: 2025-03-25

## 2025-03-25 RX ORDER — INSULIN ASPART INJECTION 100 [IU]/ML
40 INJECTION, SOLUTION SUBCUTANEOUS
COMMUNITY

## 2025-03-25 RX ORDER — ONDANSETRON 2 MG/ML
4 INJECTION INTRAMUSCULAR; INTRAVENOUS EVERY 6 HOURS PRN
Status: DISCONTINUED | OUTPATIENT
Start: 2025-03-25 | End: 2025-03-27 | Stop reason: HOSPADM

## 2025-03-25 RX ORDER — AMOXICILLIN 250 MG
2 CAPSULE ORAL 2 TIMES DAILY PRN
Status: DISCONTINUED | OUTPATIENT
Start: 2025-03-25 | End: 2025-03-27 | Stop reason: HOSPADM

## 2025-03-25 RX ORDER — SODIUM CHLORIDE 9 MG/ML
40 INJECTION, SOLUTION INTRAVENOUS AS NEEDED
Status: DISCONTINUED | OUTPATIENT
Start: 2025-03-25 | End: 2025-03-27 | Stop reason: HOSPADM

## 2025-03-25 RX ADMIN — HYDROCODONE BITARTRATE AND ACETAMINOPHEN 1 TABLET: 5; 325 TABLET ORAL at 18:03

## 2025-03-25 RX ADMIN — MELOXICAM 7.5 MG: 15 TABLET ORAL at 20:50

## 2025-03-25 RX ADMIN — ENOXAPARIN SODIUM 40 MG: 100 INJECTION SUBCUTANEOUS at 19:34

## 2025-03-25 RX ADMIN — GABAPENTIN 600 MG: 300 CAPSULE ORAL at 18:03

## 2025-03-25 NOTE — ED PROVIDER NOTES
Subjective   History of Present Illness  Patient is a 79-year-old gentleman who comes in from home with his wife at bedside with a history of type 2 diabetes and diabetic peripheral neuropathy who states that he has had increased pain in his left lower      Review of Systems   Constitutional:  Negative for chills, fatigue and fever.   HENT:  Negative for congestion, tinnitus and trouble swallowing.    Eyes:  Negative for photophobia, discharge and redness.   Respiratory:  Negative for cough and shortness of breath.    Cardiovascular:  Negative for chest pain and palpitations.   Gastrointestinal:  Negative for abdominal pain, diarrhea, nausea and vomiting.   Genitourinary:  Negative for dysuria, frequency and urgency.   Musculoskeletal:  Negative for back pain, joint swelling and myalgias.   Skin:  Negative for rash.        Left lower leg pain   Neurological:  Positive for syncope. Negative for dizziness and headaches.   Psychiatric/Behavioral:  Negative for confusion.    All other systems reviewed and are negative.      Past Medical History:   Diagnosis Date    Diabetes mellitus 2001    Type 2    Diabetic peripheral neuropathy 2/22/2022    Essential hypertension 2/22/2022    Iron deficiency anemia 7/24/2020    Mild depression 2/22/2022    Peripheral vascular disease 2/23/2022    Restless leg syndrome 2/22/2022    Type 2 diabetes mellitus with hyperglycemia 2/22/2022       No Known Allergies    No past surgical history on file.    Family History   Problem Relation Age of Onset    No Known Problems Mother     No Known Problems Father        Social History     Socioeconomic History    Marital status:    Tobacco Use    Smoking status: Never   Vaping Use    Vaping status: Never Used   Substance and Sexual Activity    Alcohol use: Not Currently    Drug use: Never    Sexual activity: Defer           Objective   Physical Exam  Constitutional:       Appearance: He is well-developed. He is obese.   HENT:      Head:  "Normocephalic and atraumatic.   Eyes:      Conjunctiva/sclera: Conjunctivae normal.      Pupils: Pupils are equal, round, and reactive to light.   Cardiovascular:      Rate and Rhythm: Normal rate and regular rhythm.      Heart sounds: Normal heart sounds.   Pulmonary:      Effort: Pulmonary effort is normal.      Breath sounds: Normal breath sounds.   Abdominal:      General: Bowel sounds are normal.      Palpations: Abdomen is soft.   Musculoskeletal:         General: Normal range of motion.      Cervical back: Normal range of motion and neck supple.      Right lower leg: Normal.      Left lower leg: Tenderness present. No swelling.      Comments: Good distal pulse good cap refill distally neurovascularly intact positive Homans' sign on the left   Skin:     General: Skin is warm and dry.      Capillary Refill: Capillary refill takes 2 to 3 seconds.   Neurological:      General: No focal deficit present.      Mental Status: He is alert and oriented to person, place, and time.      GCS: GCS eye subscore is 4. GCS verbal subscore is 5. GCS motor subscore is 6.   Psychiatric:         Mood and Affect: Mood normal.         Behavior: Behavior normal.         Procedures             EKG was obtained and reviewed and read by Dr. Willis sinus tach with a rate of 100 borderline NM interval left it was compared to the previous dated 8/28/2024 which was sinus rhythm with a rate of 93 also had a prolonged NM interval at 225    ED Course  ED Course as of 03/25/25 1933   Tue Mar 25, 2025   1828 Elsy Brown Doppler technician states the patient is negative for DVT and SVT [KW]   1855 Elsy Brown Doppler technician now states that she forgot to tell me that he has a cystic mass behind his left knee [KW]      ED Course User Index  [KW] Josefa Flores, APRN      /94   Pulse 101   Temp 98.1 °F (36.7 °C) (Oral)   Resp 16   Ht 175.3 cm (69\")   Wt 109 kg (240 lb 4.8 oz)   SpO2 92%   BMI 35.49 kg/m²   Labs Reviewed "   COMPREHENSIVE METABOLIC PANEL - Abnormal; Notable for the following components:       Result Value    Glucose 200 (*)     All other components within normal limits    Narrative:     GFR Categories in Chronic Kidney Disease (CKD)      GFR Category          GFR (mL/min/1.73)    Interpretation  G1                     90 or greater         Normal or high (1)  G2                      60-89                Mild decrease (1)  G3a                   45-59                Mild to moderate decrease  G3b                   30-44                Moderate to severe decrease  G4                    15-29                Severe decrease  G5                    14 or less           Kidney failure          (1)In the absence of evidence of kidney disease, neither GFR category G1 or G2 fulfill the criteria for CKD.    eGFR calculation 2021 CKD-EPI creatinine equation, which does not include race as a factor   CBC WITH AUTO DIFFERENTIAL - Abnormal; Notable for the following components:    MCHC 30.8 (*)     All other components within normal limits   TROPONIN - Normal    Narrative:     High Sensitive Troponin T Reference Range:  <14.0 ng/L- Negative Female for AMI  <22.0 ng/L- Negative Male for AMI  >=14 - Abnormal Female indicating possible myocardial injury.  >=22 - Abnormal Male indicating possible myocardial injury.   Clinicians would have to utilize clinical acumen, EKG, Troponin, and serial changes to determine if it is an Acute Myocardial Infarction or myocardial injury due to an underlying chronic condition.        HIGH SENSITIVITIY TROPONIN T 1HR   CBC AND DIFFERENTIAL    Narrative:     The following orders were created for panel order CBC & Differential.  Procedure                               Abnormality         Status                     ---------                               -----------         ------                     CBC Auto Differential[[id number]]        Abnormal            Final result                 Please view results  for these tests on the individual orders.   EXTRA TUBES    Narrative:     The following orders were created for panel order Extra Tubes.  Procedure                               Abnormality         Status                     ---------                               -----------         ------                     Gold Top - SST[[id number]]                                   Final result               Light Blue Top[[id number]]                                   Final result                 Please view results for these tests on the individual orders.   GOLD TOP - SST   LIGHT BLUE TOP     Medications   sodium chloride 0.9 % flush 10 mL (has no administration in time range)   sodium chloride 0.9 % flush 10 mL (has no administration in time range)   sodium chloride 0.9 % flush 10 mL (has no administration in time range)   sodium chloride 0.9 % infusion 40 mL (has no administration in time range)   ondansetron (ZOFRAN) injection 4 mg (has no administration in time range)   melatonin tablet 5 mg (has no administration in time range)   enoxaparin sodium (LOVENOX) syringe 40 mg (has no administration in time range)   sennosides-docusate (PERICOLACE) 8.6-50 MG per tablet 2 tablet (has no administration in time range)     And   polyethylene glycol (MIRALAX) packet 17 g (has no administration in time range)     And   bisacodyl (DULCOLAX) EC tablet 5 mg (has no administration in time range)     And   bisacodyl (DULCOLAX) suppository 10 mg (has no administration in time range)   meloxicam (MOBIC) tablet 7.5 mg (has no administration in time range)   gabapentin (NEURONTIN) capsule 600 mg (600 mg Oral Given 3/25/25 1803)   HYDROcodone-acetaminophen (NORCO) 5-325 MG per tablet 1 tablet (1 tablet Oral Given 3/25/25 1803)     No radiology results for the last day                                                   Medical Decision Making  Patient is a 79-year-old gentleman who comes in with severe left leg pain and numbness worsening  diabetic neuropathy he states that he is unable to bear weight.  He reports that he had a syncopal episode last night but did not strike his head he states that he did not hurt his leg at that time and he states he just landed on his bottom today.  This is concerning for DVT in the leg or just could not essential decline and lack of ADLs as he is obese he is a type II diabetic he is taken care of by his wife at home patient had an IV established and blood work was obtained and reviewed and found to be within normal limit    He had a Doppler of the left lower leg which showed no DVT no SVT it was noted to have a cystic mass behind the left knee-this may be the source of his discomfort I will do an orthopedic consultation for them to evaluate as possible source of pain    I did try to ambulate the patient he is not really even able to bear weight I will place him in ED observation for PT OT consultation in the morning and the possibility of placement  As family was agreeable this plan of care    Problems Addressed:  Diabetic polyneuropathy associated with type 2 diabetes mellitus: complicated acute illness or injury  Left leg pain: complicated acute illness or injury    Amount and/or Complexity of Data Reviewed  Independent Historian: spouse     Details: Wife at bedside  External Data Reviewed: labs and notes.  Labs: ordered. Decision-making details documented in ED Course.  Radiology: ordered and independent interpretation performed. Decision-making details documented in ED Course.  ECG/medicine tests: ordered and independent interpretation performed. Decision-making details documented in ED Course.    Risk  OTC drugs.  Prescription drug management.  Decision regarding hospitalization.        Final diagnoses:   Left leg pain   Diabetic polyneuropathy associated with type 2 diabetes mellitus       ED Disposition  ED Disposition       ED Disposition   Decision to Admit    Condition   --    Comment   --               No  follow-up provider specified.       Medication List      No changes were made to your prescriptions during this visit.            Josefa Flores, COLLIN  03/25/25 1929       Josefa Flores, COLLIN  03/25/25 1933

## 2025-03-26 ENCOUNTER — APPOINTMENT (OUTPATIENT)
Dept: GENERAL RADIOLOGY | Facility: HOSPITAL | Age: 80
End: 2025-03-26
Payer: MEDICARE

## 2025-03-26 PROBLEM — L97.922 NON-PRESSURE CHRONIC ULCER OF LEFT LOWER LEG WITH FAT LAYER EXPOSED: Status: ACTIVE | Noted: 2025-03-26

## 2025-03-26 PROBLEM — L98.429 STAGE 3 SKIN ULCER OF SACRAL REGION: Status: ACTIVE | Noted: 2025-03-26

## 2025-03-26 PROBLEM — L97.912 NON-PRESSURE ULCER OF LOWER EXTREMITY, RIGHT, WITH FAT LAYER EXPOSED: Status: ACTIVE | Noted: 2025-03-26

## 2025-03-26 PROBLEM — M79.605 LEFT LEG PAIN: Status: RESOLVED | Noted: 2025-03-25 | Resolved: 2025-03-26

## 2025-03-26 PROBLEM — L97.922 NON-PRESSURE CHRONIC ULCER OF LEFT LOWER LEG WITH FAT LAYER EXPOSED: Status: RESOLVED | Noted: 2025-03-26 | Resolved: 2025-03-26

## 2025-03-26 LAB
ANION GAP SERPL CALCULATED.3IONS-SCNC: 10.9 MMOL/L (ref 5–15)
BASOPHILS # BLD AUTO: 0.04 10*3/MM3 (ref 0–0.2)
BASOPHILS NFR BLD AUTO: 0.5 % (ref 0–1.5)
BUN SERPL-MCNC: 11 MG/DL (ref 8–23)
BUN/CREAT SERPL: 11.3 (ref 7–25)
CALCIUM SPEC-SCNC: 9.2 MG/DL (ref 8.6–10.5)
CHLORIDE SERPL-SCNC: 100 MMOL/L (ref 98–107)
CO2 SERPL-SCNC: 26.1 MMOL/L (ref 22–29)
CREAT SERPL-MCNC: 0.97 MG/DL (ref 0.76–1.27)
DEPRECATED RDW RBC AUTO: 51.9 FL (ref 37–54)
EGFRCR SERPLBLD CKD-EPI 2021: 79.4 ML/MIN/1.73
EOSINOPHIL # BLD AUTO: 0.29 10*3/MM3 (ref 0–0.4)
EOSINOPHIL NFR BLD AUTO: 3.5 % (ref 0.3–6.2)
ERYTHROCYTE [DISTWIDTH] IN BLOOD BY AUTOMATED COUNT: 14.4 % (ref 12.3–15.4)
GLUCOSE BLDC GLUCOMTR-MCNC: 270 MG/DL (ref 70–105)
GLUCOSE BLDC GLUCOMTR-MCNC: 300 MG/DL (ref 70–105)
GLUCOSE SERPL-MCNC: 149 MG/DL (ref 65–99)
HCT VFR BLD AUTO: 42.6 % (ref 37.5–51)
HGB BLD-MCNC: 13.1 G/DL (ref 13–17.7)
IMM GRANULOCYTES # BLD AUTO: 0.03 10*3/MM3 (ref 0–0.05)
IMM GRANULOCYTES NFR BLD AUTO: 0.4 % (ref 0–0.5)
LYMPHOCYTES # BLD AUTO: 2.01 10*3/MM3 (ref 0.7–3.1)
LYMPHOCYTES NFR BLD AUTO: 24.4 % (ref 19.6–45.3)
MCH RBC QN AUTO: 30 PG (ref 26.6–33)
MCHC RBC AUTO-ENTMCNC: 30.8 G/DL (ref 31.5–35.7)
MCV RBC AUTO: 97.5 FL (ref 79–97)
MONOCYTES # BLD AUTO: 0.85 10*3/MM3 (ref 0.1–0.9)
MONOCYTES NFR BLD AUTO: 10.3 % (ref 5–12)
NEUTROPHILS NFR BLD AUTO: 5.03 10*3/MM3 (ref 1.7–7)
NEUTROPHILS NFR BLD AUTO: 60.9 % (ref 42.7–76)
NRBC BLD AUTO-RTO: 0 /100 WBC (ref 0–0.2)
PLATELET # BLD AUTO: 146 10*3/MM3 (ref 140–450)
PMV BLD AUTO: 10.9 FL (ref 6–12)
POTASSIUM SERPL-SCNC: 4.5 MMOL/L (ref 3.5–5.2)
QT INTERVAL: 342 MS
QTC INTERVAL: 442 MS
RBC # BLD AUTO: 4.37 10*6/MM3 (ref 4.14–5.8)
SODIUM SERPL-SCNC: 137 MMOL/L (ref 136–145)
WBC NRBC COR # BLD AUTO: 8.25 10*3/MM3 (ref 3.4–10.8)

## 2025-03-26 PROCEDURE — 82948 REAGENT STRIP/BLOOD GLUCOSE: CPT

## 2025-03-26 PROCEDURE — 36415 COLL VENOUS BLD VENIPUNCTURE: CPT | Performed by: NURSE PRACTITIONER

## 2025-03-26 PROCEDURE — 73562 X-RAY EXAM OF KNEE 3: CPT

## 2025-03-26 PROCEDURE — 63710000001 INSULIN GLARGINE PER 5 UNITS: Performed by: PHYSICIAN ASSISTANT

## 2025-03-26 PROCEDURE — 80048 BASIC METABOLIC PNL TOTAL CA: CPT | Performed by: NURSE PRACTITIONER

## 2025-03-26 PROCEDURE — 97166 OT EVAL MOD COMPLEX 45 MIN: CPT

## 2025-03-26 PROCEDURE — 96372 THER/PROPH/DIAG INJ SC/IM: CPT

## 2025-03-26 PROCEDURE — 25010000002 ENOXAPARIN PER 10 MG: Performed by: NURSE PRACTITIONER

## 2025-03-26 PROCEDURE — 85025 COMPLETE CBC W/AUTO DIFF WBC: CPT | Performed by: NURSE PRACTITIONER

## 2025-03-26 PROCEDURE — 63710000001 INSULIN LISPRO (HUMAN) PER 5 UNITS: Performed by: PHYSICIAN ASSISTANT

## 2025-03-26 PROCEDURE — 97162 PT EVAL MOD COMPLEX 30 MIN: CPT

## 2025-03-26 PROCEDURE — G0378 HOSPITAL OBSERVATION PER HR: HCPCS

## 2025-03-26 RX ORDER — DEXTROSE MONOHYDRATE 25 G/50ML
25 INJECTION, SOLUTION INTRAVENOUS
Status: DISCONTINUED | OUTPATIENT
Start: 2025-03-26 | End: 2025-03-27 | Stop reason: HOSPADM

## 2025-03-26 RX ORDER — ATORVASTATIN CALCIUM 20 MG/1
20 TABLET, FILM COATED ORAL DAILY
Status: DISCONTINUED | OUTPATIENT
Start: 2025-03-27 | End: 2025-03-27 | Stop reason: HOSPADM

## 2025-03-26 RX ORDER — GABAPENTIN 300 MG/1
600 CAPSULE ORAL EVERY 8 HOURS SCHEDULED
Status: DISCONTINUED | OUTPATIENT
Start: 2025-03-26 | End: 2025-03-27 | Stop reason: HOSPADM

## 2025-03-26 RX ORDER — DULOXETIN HYDROCHLORIDE 30 MG/1
60 CAPSULE, DELAYED RELEASE ORAL DAILY
Status: DISCONTINUED | OUTPATIENT
Start: 2025-03-26 | End: 2025-03-27 | Stop reason: HOSPADM

## 2025-03-26 RX ORDER — IBUPROFEN 600 MG/1
1 TABLET ORAL
Status: DISCONTINUED | OUTPATIENT
Start: 2025-03-26 | End: 2025-03-27 | Stop reason: HOSPADM

## 2025-03-26 RX ORDER — INSULIN LISPRO 100 [IU]/ML
2-9 INJECTION, SOLUTION INTRAVENOUS; SUBCUTANEOUS
Status: DISCONTINUED | OUTPATIENT
Start: 2025-03-26 | End: 2025-03-27 | Stop reason: HOSPADM

## 2025-03-26 RX ORDER — NICOTINE POLACRILEX 4 MG
15 LOZENGE BUCCAL
Status: DISCONTINUED | OUTPATIENT
Start: 2025-03-26 | End: 2025-03-27 | Stop reason: HOSPADM

## 2025-03-26 RX ADMIN — INSULIN LISPRO 7 UNITS: 100 INJECTION, SOLUTION INTRAVENOUS; SUBCUTANEOUS at 17:07

## 2025-03-26 RX ADMIN — GABAPENTIN 600 MG: 300 CAPSULE ORAL at 21:39

## 2025-03-26 RX ADMIN — Medication 10 ML: at 08:02

## 2025-03-26 RX ADMIN — DULOXETINE 60 MG: 30 CAPSULE, DELAYED RELEASE ORAL at 17:07

## 2025-03-26 RX ADMIN — ENOXAPARIN SODIUM 40 MG: 100 INJECTION SUBCUTANEOUS at 16:33

## 2025-03-26 RX ADMIN — INSULIN LISPRO 6 UNITS: 100 INJECTION, SOLUTION INTRAVENOUS; SUBCUTANEOUS at 21:39

## 2025-03-26 RX ADMIN — INSULIN GLARGINE 60 UNITS: 100 INJECTION, SOLUTION SUBCUTANEOUS at 21:39

## 2025-03-26 RX ADMIN — Medication: at 21:39

## 2025-03-26 RX ADMIN — MELOXICAM 7.5 MG: 15 TABLET ORAL at 08:01

## 2025-03-26 RX ADMIN — Medication 10 ML: at 21:39

## 2025-03-26 NOTE — THERAPY EVALUATION
Patient Name: Ignacio Miranda  : 1945    MRN: [id number]                              Today's Date: 3/26/2025       Admit Date: 3/25/2025    Visit Dx:     ICD-10-CM ICD-9-CM   1. Left leg pain  M79.605 729.5   2. Diabetic polyneuropathy associated with type 2 diabetes mellitus  E11.42 250.60     357.2     Patient Active Problem List   Diagnosis    Iron deficiency anemia    Normocytic anemia    Essential hypertension    Type 2 diabetes mellitus with hyperglycemia    Diabetic peripheral neuropathy    Mild depression    Restless leg syndrome    Peripheral vascular disease    Back pain    Near syncope     Past Medical History:   Diagnosis Date    Diabetes mellitus 2001    Type 2    Diabetic peripheral neuropathy 2022    Essential hypertension 2022    Iron deficiency anemia 2020    Mild depression 2022    Peripheral vascular disease 2022    Restless leg syndrome 2022    Type 2 diabetes mellitus with hyperglycemia 2022     History reviewed. No pertinent surgical history.   General Information       Row Name 25 1440          Physical Therapy Time and Intention    Document Type evaluation  -     Mode of Treatment physical therapy  -       Row Name 25 1440          General Information    Patient Profile Reviewed yes  -SS     Prior Level of Function mod assist:;ADL's  patient walks to toilet with a RW. Otherwise he stays in lift chair. Has grab bar in bathroom to help him stand and also uses the sink. Cannot fit walker in bathroom so he holds onto sink  -     Existing Precautions/Restrictions fall  -SS       Row Name 25 1440          Living Environment    Current Living Arrangements home  -SS     People in Home child(kath), adult;spouse  -SS       Row Name 25 1440          Home Main Entrance    Number of Stairs, Main Entrance four  -SS       Row Name 25 1440          Stairs Within Home, Primary    Number of Stairs, Within Home, Primary none  -SS        Row Name 03/26/25 1440          Cognition    Orientation Status (Cognition) oriented x 4  -SS       Row Name 03/26/25 1440          Safety Issues/Impairments Affecting Functional Mobility    Impairments Affecting Function (Mobility) endurance/activity tolerance;pain  -SS               User Key  (r) = Recorded By, (t) = Taken By, (c) = Cosigned By      Initials Name Provider Type     Adela Damon, PT Physical Therapist                   Mobility       Row Name 03/26/25 1502          Bed Mobility    Bed Mobility bed mobility (all) activities  -     All Activities, [place] (Bed Mobility) modified independence  -       Row Name 03/26/25 1502          Bed-Chair Transfer    Bed-Chair [place] (Transfers) minimum assist (75% patient effort);2 person assist  -     Assistive Device (Bed-Chair Transfers) walker, front-wheeled  -SS       Row Name 03/26/25 1502          Sit-Stand Transfer    Sit-Stand [place] (Transfers) minimum assist (75% patient effort)  -     Assistive Device (Sit-Stand Transfers) walker, front-wheeled  -SS       Row Name 03/26/25 1502          Gait/Stairs (Locomotion)    [place] Level (Gait) minimum assist (75% patient effort);2 person assist  -     Assistive Device (Gait) walker, front-wheeled  -SS     Distance in Feet (Gait) 4  -SS     Comment, (Gait/Stairs) moderate postural instability, poor L knee stability  -               User Key  (r) = Recorded By, (t) = Taken By, (c) = Cosigned By      Initials Name Provider Type     Adela Damon, PT Physical Therapist                   Obj/Interventions       Row Name 03/26/25 1517          Range of Motion Comprehensive    Comment, General Range of Motion decreased active L knee extension but able to flex knee and externally rotate hip to don shoe with difficulty on L side  -       Row Name 03/26/25 1517          Strength Comprehensive (MMT)    Comment, General Manual Muscle Testing (MMT) Assessment R LE >3/5; L  hip >3/5, L knee extension 2/5, ankle DF >3/5  -       Row Name 03/26/25 1517          Balance    Balance Assessment sitting static balance;standing static balance  -SS     Static Sitting Balance independent  -SS     Static Standing Balance minimal assist  -SS     Position/Device Used, Standing Balance walker, front-wheeled  -SS       Row Name 03/26/25 1517          Sensory Assessment (Somatosensory)    Sensory Assessment (Somatosensory) --  severe peripheral neuropathy  -               User Key  (r) = Recorded By, (t) = Taken By, (c) = Cosigned By      Initials Name Provider Type    SS Adela Damon L, PT Physical Therapist                   Goals/Plan       Row Name 03/26/25 1546          Bed Mobility Goal 1 (PT)    Activity/Assistive Device (Bed Mobility Goal 1, PT) bed mobility activities, all  -SS     [place] Level/Cues Needed (Bed Mobility Goal 1, PT) modified independence  -SS     Time Frame (Bed Mobility Goal 1, PT) long term goal (LTG);2 weeks  -       Row Name 03/26/25 1546          Transfer Goal 1 (PT)    Activity/Assistive Device (Transfer Goal 1, PT) transfers, all  -SS     [place] Level/Cues Needed (Transfer Goal 1, PT) modified independence  -SS     Time Frame (Transfer Goal 1, PT) long term goal (LTG);2 weeks  -       Row Name 03/26/25 1549          Gait Training Goal 1 (PT)    Activity/Assistive Device (Gait Training Goal 1, PT) gait (walking locomotion);walker, rolling  -SS     [place] Level (Gait Training Goal 1, PT) modified independence  -SS     Distance (Gait Training Goal 1, PT) 20'  -SS     Time Frame (Gait Training Goal 1, PT) long term goal (LTG);2 weeks  -       Row Name 03/26/25 1543          Therapy Assessment/Plan (PT)    Planned Therapy Interventions (PT) balance training;bed mobility training;gait training;home exercise program;transfer training;strengthening;patient/family education  -               User Key  (r) = Recorded By, (t) = Taken By, (c) =  Cosigned By      Initials Name Provider Type     Adela Damon, PT Physical Therapist                   Clinical Impression       Row Name 03/26/25 152          Pain    Additional Documentation Pain Scale: FACES Pre/Post-Treatment (Group)  -       Row Name 03/26/25 1528          Pain Scale: FACES Pre/Post-Treatment    Pain: FACES Scale, Pretreatment 4-->hurts little more  -SS     Posttreatment Pain Rating 4-->hurts little more  -       Row Name 03/26/25 1522          Plan of Care Review    Plan of Care Reviewed With patient  -     Outcome Evaluation 80 y/o M who presented with recurrent falls over the last 2 days and severe L knee pain in weight bearing. 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 No evidence of acute fracture. (-) for DVT.  ultrasound noted popliteal fossa fluid collection, L knee. Has ortho consult pending. Patient lives in a one story home with 4 PINA with spouse and son. Spouse and son are not able to physically assist him. He only ambulates to the bathroom with RW, otherwise he stays in lift chair. He also sleeps in his lift chair. Patient was mod I with bed mobility, min A of 2 to stand with RW from elevated bed. Min A of 2 to transfer to recliner, min A of 2 to ambulate 4' with RW. Distance limited by pain and fatigue. Patient does not have assist available at home. Continues to be at high risk for falls based on increased assist needed. Recommending SNF at d/c.  -       Row Name 03/26/25 1521          Therapy Assessment/Plan (PT)    Criteria for Skilled Interventions Met (PT) yes;meets criteria  -     Therapy Frequency (PT) 5 times/wk  -     Predicted Duration of Therapy Intervention (PT) until d/c  -       Row Name 03/26/25 9052          Vital Signs    Pre Systolic BP Rehab 117  -SS     Pre Treatment Diastolic BP 65  -SS     Intra Systolic BP Rehab 129  -SS     Intra Treatment Diastolic BP 73  -SS       Row Name 03/26/25 4545           Positioning and Restraints    Pre-Treatment Position in bed  -SS     Post Treatment Position chair  -SS     In Chair exit alarm on  -SS               User Key  (r) = Recorded By, (t) = Taken By, (c) = Cosigned By      Initials Name Provider Type    SS Adela Damon PT Physical Therapist                   Outcome Measures    No documentation.                                Physical Therapy Education       Title: PT OT SLP Therapies (Done)       Topic: Physical Therapy (Done)       Point: Mobility training (Done)       Learning Progress Summary            Patient Acceptance, E, VU by  at 3/26/2025 1546                                      User Key       Initials Effective Dates Name Provider Type Discipline     06/16/21 -  Adela Damon PT Physical Therapist PT                  PT Recommendation and Plan  Planned Therapy Interventions (PT): balance training, bed mobility training, gait training, home exercise program, transfer training, strengthening, patient/family education  Outcome Evaluation: 78 y/o M who presented with recurrent falls over the last 2 days and severe L knee pain in weight bearing. 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 No evidence of acute fracture. (-) for DVT.  ultrasound noted popliteal fossa fluid collection, L knee. Has ortho consult pending. Patient lives in a one story home with 4 PINA with spouse and son. Spouse and son are not able to physically assist him. He only ambulates to the bathroom with RW, otherwise he stays in lift chair. He also sleeps in his lift chair. Patient was mod I with bed mobility, min A of 2 to stand with RW from elevated bed. Min A of 2 to transfer to recliner, min A of 2 to ambulate 4' with RW. Distance limited by pain and fatigue. Patient does not have assist available at home. Continues to be at high risk for falls based on increased assist needed. Recommending SNF at d/c.     Time Calculation:   PT  Evaluation Complexity  History, PT Evaluation Complexity: 3 or more personal factors and/or comorbidities  Examination of Body Systems (PT Eval Complexity): total of 3 or more elements  Clinical Presentation (PT Evaluation Complexity): evolving  Clinical Decision Making (PT Evaluation Complexity): moderate complexity  Overall Complexity (PT Evaluation Complexity): moderate complexity     PT Charges       Row Name 03/26/25 1546             Time Calculation    Start Time 1357  -SS      Stop Time 1427  -SS      Time Calculation (min) 30 min  -      PT Received On 03/26/25  -      PT - Next Appointment 03/27/25  -      PT Goal Re-Cert Due Date 04/09/25  -         Time Calculation- PT    Total Timed Code Minutes- PT 0 minute(s)  -                User Key  (r) = Recorded By, (t) = Taken By, (c) = Cosigned By      Initials Name Provider Type    SS Adela Damon, PT Physical Therapist                  Therapy Charges for Today       Code Description Service Date Service Provider Modifiers Qty    02094168422 HC PT EVAL MOD COMPLEXITY 4 3/26/2025 Adela Damon PT GP 1            PT G-Codes  AM-PAC 6 Clicks Score (PT): 10  PT Discharge Summary  Anticipated Discharge Disposition (PT): skilled nursing facility    Adela Damon PT  3/26/2025

## 2025-03-26 NOTE — PLAN OF CARE
Problem: Adult Inpatient Plan of Care  Goal: Readiness for Transition of Care  Intervention: Mutually Develop Transition Plan  Recent Flowsheet Documentation  Taken 3/25/2025 2144 by Bianca Toth, RN  Transportation Anticipated: family or friend will provide  Patient/Family Anticipated Services at Transition: none  Patient/Family Anticipates Transition to: home with family  Taken 3/25/2025 2140 by Bianca Toth, RN  Equipment Currently Used at Home:   oxygen   walker, rolling   wheelchair   shower chair   Goal Outcome Evaluation:

## 2025-03-26 NOTE — PLAN OF CARE
Problem: Adult Inpatient Plan of Care  Goal: Absence of Hospital-Acquired Illness or Injury  Intervention: Identify and Manage Fall Risk  Recent Flowsheet Documentation  Taken 3/26/2025 1400 by Lexis Gonzalez, RN  Safety Promotion/Fall Prevention: safety round/check completed   Goal Outcome Evaluation:

## 2025-03-26 NOTE — THERAPY EVALUATION
Patient Name: Ignacio Miranda  : 1945    MRN: [id number]                              Today's Date: 3/26/2025       Admit Date: 3/25/2025    Visit Dx:     ICD-10-CM ICD-9-CM   1. Left leg pain  M79.605 729.5   2. Diabetic polyneuropathy associated with type 2 diabetes mellitus  E11.42 250.60     357.2     Patient Active Problem List   Diagnosis    Iron deficiency anemia    Normocytic anemia    Essential hypertension    Type 2 diabetes mellitus with hyperglycemia    Diabetic peripheral neuropathy    Mild depression    Restless leg syndrome    Peripheral vascular disease    Back pain    Near syncope     Past Medical History:   Diagnosis Date    Diabetes mellitus 2001    Type 2    Diabetic peripheral neuropathy 2022    Essential hypertension 2022    Iron deficiency anemia 2020    Mild depression 2022    Peripheral vascular disease 2022    Restless leg syndrome 2022    Type 2 diabetes mellitus with hyperglycemia 2022     History reviewed. No pertinent surgical history.   General Information       Row Name 25 1618          OT Time and Intention    Subjective Information no complaints  -JK     Document Type evaluation  -JK     Mode of Treatment occupational therapy  -JK     Patient Effort good  -JK     Symptoms Noted During/After Treatment none  -JK       Row Name 25 1618          General Information    Patient Profile Reviewed yes  -JK     Prior Level of Function mod assist:;ADL's;independent:;all household mobility  -JK     Existing Precautions/Restrictions fall  -JK     Barriers to Rehab previous functional deficit  -JK       Row Name 25 1618          Occupational Profile    Reason for Services/Referral (Occupational Profile) Pt is a 79 y.o. year old male brought to hospital 3/25/25 with severe left leg pain and numbness, worsening diabetic neuropathy and unable to bear weight through LLE. Pt also reports a syncopal episode at home one day prior. Doppler of LLE  shows no DVT and no SVT and did find cystic mass behind L knee. 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 No evidence of acute fracture. Pt has had multiple falls over the past two days.    Pmhx significant for obesity and T2DM.    At baseline, pt lives with his spouse and son in [facility] with 4 steps to enter. Pt's son assists with steps to enter home. Pt requires assistance with bathing, dressing, toileting at baseline. Pt is IND with mobility and short distances in home with RW and uses w/c out of home. Pt sleeps in a lift chair and reports he spends most of his time in the chair.  -ViViFi       Row Name 03/26/25 1618          Living Environment    Current Living Arrangements home  -ViViFi     People in Home child(kath), adult;alone  -ViViFi       Row Name 03/26/25 1618          Home Main Entrance    Number of Stairs, Main Entrance four  -ViViFi       Row Name 03/26/25 1618          Stairs Within Home, Primary    Number of Stairs, Within Home, Primary none  -ViViFi       Row Name 03/26/25 1618          Cognition    Orientation Status (Cognition) oriented x 4  -ViViFi       Row Name 03/26/25 1618          Safety Issues/Impairments Affecting Functional Mobility    Impairments Affecting Function (Mobility) endurance/activity tolerance;pain  -ViViFi               User Key  (r) = Recorded By, (t) = Taken By, (c) = Cosigned By      Initials Name Provider Type    Yoana Abdi OT Occupational Therapist                     Mobility/ADL's       Row Name 03/26/25 1619          Bed Mobility    Comment, (Bed Mobility) pt seated EOB at OT arrival  -ViViFi       Row Name 03/26/25 1619          Bed-Chair Transfer    Bed-Chair [place] (Transfers) minimum assist (75% patient effort);2 person assist  -[place]     Assistive Device (Bed-Chair Transfers) walker, front-wheeled  -ViViFi       Row Name 03/26/25 1619          Sit-Stand Transfer    Sit-Stand [place] (Transfers) minimum assist (75% patient effort)  -[place]     Assistive  Device (Sit-Stand Transfers) walker, front-wheeled  -       Row Name 03/26/25 1619          Functional Mobility    Functional Mobility- Ind. Level minimum assist (75% patient effort);2 person assist required  -     Functional Mobility- Device walker, front-wheeled  -JK     Functional Mobility- Comment Pt able to walk 4 feet approx forward and back  -       Row Name 03/26/25 1619          Activities of Daily Living    BADL Assessment/Intervention lower body dressing;toileting  -       Row Name 03/26/25 1619          Lower Body Dressing Assessment/Training    [place] Level (Lower Body Dressing) lower body dressing skills;maximum assist (25% patient effort)  -JK     Position (Lower Body Dressing) edge of bed sitting  -       Row Name 03/26/25 1619          Toileting Assessment/Training    [place] Level (Toileting) toileting skills;moderate assist (50% patient effort)  -JK     Position (Toileting) supported standing  -               User Key  (r) = Recorded By, (t) = Taken By, (c) = Cosigned By      Initials Name Provider Type    Yoana Abdi OT Occupational Therapist                   Obj/Interventions       Row Name 03/26/25 1620          Sensory Assessment (Somatosensory)    Sensory Assessment peripheral neuropathy  -       Row Name 03/26/25 1620          Range of Motion Comprehensive    General Range of Motion bilateral upper extremity ROM WNL  -     Comment, General Range of Motion BUE WFL ROM  -       Row Name 03/26/25 1620          Strength Comprehensive (MMT)    General Manual Muscle Testing (MMT) Assessment upper extremity strength deficits identified  -     Comment, General Manual Muscle Testing (MMT) Assessment BUE 4-/5 grossly  -       Row Name 03/26/25 1620          Balance    Balance Assessment sitting static balance;standing static balance  -J     Static Sitting Balance independent  -     Position, Sitting Balance sitting edge of bed  -     Static Standing  Balance minimal assist  -JK     Position/Device Used, Standing Balance walker, front-wheeled  -JK               User Key  (r) = Recorded By, (t) = Taken By, (c) = Cosigned By      Initials Name Provider Type    Yoana Abdi OT Occupational Therapist                   Goals/Plan       Row Name 03/26/25 1624          Transfer Goal 1 (OT)    Activity/Assistive Device (Transfer Goal 1, OT) transfers, all  -JK     [place] Level/Cues Needed (Transfer Goal 1, OT) standby assist  -JK     Time Frame (Transfer Goal 1, OT) 2 weeks  -       Row Name 03/26/25 1624          Dressing Goal 1 (OT)    Activity/Device (Dressing Goal 1, OT) lower body dressing  -JK     [place]/Cues Needed (Dressing Goal 1, OT) standby assist  -JK     Time Frame (Dressing Goal 1, OT) 2 weeks  -       Row Name 03/26/25 1624          Toileting Goal 1 (OT)    Activity/Device (Toileting Goal 1, OT) toileting skills, all  -JK     [place] Level/Cues Needed (Toileting Goal 1, OT) standby assist  -JK     Time Frame (Toileting Goal 1, OT) 2 weeks  -       Row Name 03/26/25 1624          Therapy Assessment/Plan (OT)    Planned Therapy Interventions (OT) activity tolerance training;adaptive equipment training;BADL retraining;functional balance retraining;occupation/activity based interventions;neuromuscular control/coordination retraining;patient/caregiver education/training;ROM/therapeutic exercise;strengthening exercise;transfer/mobility retraining  -JK               User Key  (r) = Recorded By, (t) = Taken By, (c) = Cosigned By      Initials Name Provider Type    Yoana Abdi OT Occupational Therapist                   Clinical Impression       Row Name 03/26/25 1621          Pain Assessment    Pretreatment Pain Rating 1/10  -JK     Posttreatment Pain Rating 8/10  -JK     Pain Location extremity  -JK     Pain Side/Orientation left;lower  -JK     Pain Management Interventions activity modification encouraged  -JK     Response  to Pain Interventions activity participation with tolerable pain  -COURTNEY       Row Name 03/26/25 1621          Plan of Care Review    Outcome Evaluation Pt is a 79 y.o. year old male brought to hospital 3/25/25 with severe left leg pain and numbness, worsening diabetic neuropathy and unable to bear weight through LLE. Pt also reports a syncopal episode at home one day prior. Doppler of LLE shows no DVT and no SVT and did find cystic mass behind L knee. 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 No evidence of acute fracture. Pt has had multiple falls over the past two days.    Pmhx significant for obesity and T2DM.    At baseline, pt lives with his spouse and son in [facility] with 4 steps to enter. Pt's son assists with steps to enter home. Pt requires assistance with bathing, dressing, toileting at baseline. Pt is IND with mobility and short distances in home with RW and uses w/c out of home. Pt sleeps in a lift chair and reports he spends most of his time in the chair.     Upon OT evaluation, pt negative for orthostatic BP but does report sometimes he has dizziness when he is standing. Pt completed functional transfers out of bed with min A x2 with elevated bed height and arm rests on chair used. Pt was able to walk approx 4 feet with RW and min A x2 with pt reporting increased pain in LLE with standing and walking. Pt heavily WB onto RW during walking. Pt requires max A for donning shoes and removing shoes. Pt unable to manage gown around back in standing and requires [place] for standing balance without BUE support. Pt's spouse states she is worried about pt returning home due to falls hx and she is unable to provide physical assist with transfers and mobility. Pt is below baseline and will benefit from SNF at d/c due to high fall risk and not safe to d/c home.  -COURTNEY       Row Name 03/26/25 1628          Therapy Assessment/Plan (OT)    Rehab Potential (OT) fair  -COURTNEY     Criteria for Skilled  Therapeutic Interventions Met (OT) yes;skilled treatment is necessary  -JK     Therapy Frequency (OT) 3 times/wk  -JK     Predicted Duration of Therapy Intervention (OT) until discharge  -CASA       Row Name 03/26/25 1621          Therapy Plan Review/Discharge Plan (OT)    Anticipated Discharge Disposition (OT) [facility] nursing facility  -       Row Name 03/26/25 1621          Vital Signs    Pre Systolic BP Rehab 117  -JK     Pre Treatment Diastolic BP 65  -JK     Intra Systolic BP Rehab 129  -JK     Intra Treatment Diastolic BP 73  -JK       Row Name 03/26/25 1621          Positioning and Restraints    Pre-Treatment Position in bed  -JK     Post Treatment Position chair  -JK     In Chair reclined;sitting;call light within reach;encouraged to call for assist;exit alarm on  -JK               User Key  (r) = Recorded By, (t) = Taken By, (c) = Cosigned By      Initials Name Provider Type    Yoana Abdi OT Occupational Therapist                   Outcome Measures       Row Name 03/26/25 1625          How much help from another is currently needed...    Putting on and taking off regular lower body clothing? 2  -JK     Bathing (including washing, rinsing, and drying) 2  -JK     Toileting (which includes using toilet bed pan or urinal) 2  -JK     Putting on and taking off regular upper body clothing 3  -JK     Taking care of personal grooming (such as brushing teeth) 3  -JK     Eating meals 4  -JK     AM-PAC 6 Clicks Score (OT) 16  -       Row Name 03/26/25 1625          Functional Assessment    Outcome Measure Options AM-PAC 6 Clicks Daily Activity (OT)  -               User Key  (r) = Recorded By, (t) = Taken By, (c) = Cosigned By      Initials Name Provider Type    Yoana Abdi OT Occupational Therapist                    Occupational Therapy Education       Title: PT OT SLP Therapies (Done)       Topic: Occupational Therapy (Done)       Point: ADL training (Done)       Learning Progress Summary             Patient Acceptance, E,TB, VU by COURTNEY at 3/26/2025 1627                      Point: Precautions (Done)       Learning Progress Summary            Patient Acceptance, E,TB, VU by COURTNEY at 3/26/2025 1627                      Point: Body mechanics (Done)       Learning Progress Summary            Patient Acceptance, E,TB, VU by COURTNEY at 3/26/2025 1627                                      User Key       Initials Effective Dates Name Provider Type Discipline    COURTNEY 02/20/25 -  Yoana Lynn OT Occupational Therapist OT                  OT Recommendation and Plan  Planned Therapy Interventions (OT): activity tolerance training, adaptive equipment training, BADL retraining, functional balance retraining, occupation/activity based interventions, neuromuscular control/coordination retraining, patient/caregiver education/training, ROM/therapeutic exercise, strengthening exercise, transfer/mobility retraining  Therapy Frequency (OT): 3 times/wk  Plan of Care Review  Outcome Evaluation: Pt is a 79 y.o. year old male brought to hospital 3/25/25 with severe left leg pain and numbness, worsening diabetic neuropathy and unable to bear weight through LLE. Pt also reports a syncopal episode at home one day prior. Doppler of LLE shows no DVT and no SVT and did find cystic mass behind L knee. 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 No evidence of acute fracture. Pt has had multiple falls over the past two days.    Pmhx significant for obesity and T2DM.    At baseline, pt lives with his spouse and son in [facility] with 4 steps to enter. Pt's son assists with steps to enter home. Pt requires assistance with bathing, dressing, toileting at baseline. Pt is IND with mobility and short distances in home with RW and uses w/c out of home. Pt sleeps in a lift chair and reports he spends most of his time in the chair.     Upon OT evaluation, pt negative for orthostatic BP but does report sometimes he has  dizziness when he is standing. Pt completed functional transfers out of bed with min A x2 with elevated bed height and arm rests on chair used. Pt was able to walk approx 4 feet with RW and min A x2 with pt reporting increased pain in LLE with standing and walking. Pt heavily WB onto RW during walking. Pt requires max A for donning shoes and removing shoes. Pt unable to manage gown around back in standing and requires [place] for standing balance without BUE support. Pt's spouse states she is worried about pt returning home due to falls hx and she is unable to provide physical assist with transfers and mobility. Pt is below baseline and will benefit from SNF at d/c due to high fall risk and not safe to d/c home.     Time Calculation:         Time Calculation- OT       Row Name 03/26/25 1626             Time Calculation- OT    OT Start Time 1407  -COURTNEY      OT Stop Time 1430  -COURTNEY      OT Time Calculation (min) 23 min  -COURTNEY      OT Received On 03/26/25  -COURTNEY      OT - Next Appointment 03/28/25  -COURTNEY DUARTE Goal Re-Cert Due Date 04/09/25  -COURTNEY                User Key  (r) = Recorded By, (t) = Taken By, (c) = Cosigned By      Initials Name Provider Type    Yoana Abdi OT Occupational Therapist                  Therapy Charges for Today       Code Description Service Date Service Provider Modifiers Qty    [id number] HC OT EVAL MOD COMPLEXITY 4 3/26/2025 Yoana Lynn OT GO 1                 Yoana Lynn OT  3/26/2025

## 2025-03-26 NOTE — ED NOTES
Nursing report ED to floor  Ignacio Miranda  79 y.o.  male    HPI:   Chief Complaint   Patient presents with    Leg Pain       Admitting doctor:   Claudy Willis MD    Admitting diagnosis:   The primary encounter diagnosis was Left leg pain. A diagnosis of Diabetic polyneuropathy associated with type 2 diabetes mellitus was also pertinent to this visit.    Code status:   Current Code Status       Date Active Code Status Order ID Comments User Context       3/25/2025 1916 CPR (Attempt to Resuscitate) [id number]  Josefa Flores, [facility]        Question Answer    Code Status (Patient has no pulse and is not breathing) CPR (Attempt to Resuscitate)    Medical Interventions (Patient has pulse or is breathing) Full Support    Level Of Support Discussed With Patient                    Allergies:   Patient has no known allergies.    Isolation:  No active isolations     Fall Risk:  Fall Risk Assessment was completed, and patient is at high risk for falls.   Predictive Model Details         24 (Low) Factor Value    Calculated 3/25/2025 20:57 Age 79    Risk of Fall Model Active Peripheral IV Present     Skin Assessment X     Magnesium not on file     Number of Distinct Medication Classes administered 4     Number of administrations of Anti-Convulsants 1     Addy Scale not on file     Number of administrations of Anti-Rheumatics 1     Diastolic BP 73     Chloride 100 mmol/L     Clinically Relevant Sex Not Female     Number of administrations of Analgesic Narcotics 1     Albumin 4 g/dL     Respiratory Rate 16     Number of administrations of Anti-Coagulants 1     Duration of Current Encounter 0.16 days     Creatinine 1.11 mg/dL     Calcium 9.6 mg/dL     Total Bilirubin 0.3 mg/dL     Potassium 3.8 mmol/L     ALT 16 U/L         Weight:       03/25/25  1706   Weight: 109 kg (240 lb 4.8 oz)       Intake and Output  No intake or output data in the 24 hours ending 03/25/25 2100    Diet:   Dietary Orders (From admission, onward)  "      Start     Ordered    03/25/25 1916  Diet: Cardiac, Diabetic; Healthy Heart (2-3 Na+); Consistent Carbohydrate; Fluid Consistency: Thin (IDDSI 0)  Diet Effective Now        References:    Diet Order Definitions   Question Answer Comment   Diets: Cardiac    Diets: Diabetic    Cardiac Diet: Healthy Heart (2-3 Na+)    Diabetic Diet: Consistent Carbohydrate    Fluid Consistency: Thin (IDDSI 0)        03/25/25 1916                     Most recent vitals:   Vitals:    03/25/25 1706 03/25/25 1846 03/25/25 1917 03/25/25 2047   BP:  130/78 145/94 116/73   BP Location:       Patient Position:       Pulse:  102 101 106   Resp:  16     Temp:       TempSrc:       SpO2:  92% 92% 91%   Weight: 109 kg (240 lb 4.8 oz)      Height: 175.3 cm (69\")          Active LDAs/IV Access:   Lines, Drains & Airways       Active LDAs       Name Placement date Placement time Site Days    Peripheral IV 03/25/25 1806 Left Antecubital 03/25/25 1806  Antecubital  less than 1                    Skin Condition:   Skin Assessments (last day)       Date/Time Skin WDL    03/25/25 17:16:50 X     Skin WDL: stage 1 wound on top of coccyx at 03/25/25 1716             Labs (abnormal labs have a star):   Labs Reviewed   COMPREHENSIVE METABOLIC PANEL - Abnormal; Notable for the following components:       Result Value    Glucose 200 (*)     All other components within normal limits    Narrative:     GFR Categories in Chronic Kidney Disease (CKD)      GFR Category          GFR (mL/min/1.73)    Interpretation  G1                     90 or greater         Normal or high (1)  G2                      60-89                Mild decrease (1)  G3a                   45-59                Mild to moderate decrease  G3b                   30-44                Moderate to severe decrease  G4                    15-29                Severe decrease  G5                    14 or less           Kidney failure          (1)In the absence of evidence of kidney disease, neither " GFR category G1 or G2 fulfill the criteria for CKD.    eGFR calculation 2021 CKD-EPI creatinine equation, which does not include race as a factor   CBC WITH AUTO DIFFERENTIAL - Abnormal; Notable for the following components:    MCHC 30.8 (*)     All other components within normal limits   TROPONIN - Normal    Narrative:     High Sensitive Troponin T Reference Range:  <14.0 ng/L- Negative Female for AMI  <22.0 ng/L- Negative Male for AMI  >=14 - Abnormal Female indicating possible myocardial injury.  >=22 - Abnormal Male indicating possible myocardial injury.   Clinicians would have to utilize clinical acumen, EKG, Troponin, and serial changes to determine if it is an Acute Myocardial Infarction or myocardial injury due to an underlying chronic condition.        HIGH SENSITIVITIY TROPONIN T 1HR - Normal    Narrative:     High Sensitive Troponin T Reference Range:  <14.0 ng/L- Negative Female for AMI  <22.0 ng/L- Negative Male for AMI  >=14 - Abnormal Female indicating possible myocardial injury.  >=22 - Abnormal Male indicating possible myocardial injury.   Clinicians would have to utilize clinical acumen, EKG, Troponin, and serial changes to determine if it is an Acute Myocardial Infarction or myocardial injury due to an underlying chronic condition.        CBC AND DIFFERENTIAL    Narrative:     The following orders were created for panel order CBC & Differential.  Procedure                               Abnormality         Status                     ---------                               -----------         ------                     CBC Auto Differential[[id number]]        Abnormal            Final result                 Please view results for these tests on the individual orders.   EXTRA TUBES    Narrative:     The following orders were created for panel order Extra Tubes.  Procedure                               Abnormality         Status                     ---------                               -----------          ------                     Gold Top - SST[[id number]]                                   Final result               Light Blue Top[[id number]]                                   Final result                 Please view results for these tests on the individual orders.   Critical access hospital   [facility]       LOC: Person, Place, Time, and Situation    Telemetry:  Observation Unit    Cardiac Monitoring Ordered: no    EKG:   ECG 12 Lead Syncope   Preliminary Result   HEART VVIK=361  bpm   RR Xktntjvk=659  ms   MT Byyqaxxo=284  ms   P Horizontal Axis=25  deg   P Front Axis=42  deg   QRSD Interval=89  ms   QT Qkvzkqft=315  ms   DRlC=637  ms   QRS Axis=-58  deg   T Wave Axis=23  deg   - ABNORMAL ECG -   Sinus tachycardia   Borderline prolonged MT interval   Left anterior fascicular block   Anterior infarct, old   Date and Time of Study:2025-03-25 18:34:32          Medications Given in the ED:   Medications   sodium chloride 0.9 % flush 10 mL (has no administration in time range)   sodium chloride 0.9 % flush 10 mL (has no administration in time range)   sodium chloride 0.9 % flush 10 mL (has no administration in time range)   sodium chloride 0.9 % infusion 40 mL (has no administration in time range)   ondansetron (ZOFRAN) injection 4 mg (has no administration in time range)   melatonin tablet 5 mg (has no administration in time range)   enoxaparin sodium (LOVENOX) syringe 40 mg (40 mg Subcutaneous Given 3/25/25 1934)   sennosides-docusate (PERICOLACE) 8.6-50 MG per tablet 2 tablet (has no administration in time range)     And   polyethylene glycol (MIRALAX) packet 17 g (has no administration in time range)     And   bisacodyl (DULCOLAX) EC tablet 5 mg (has no administration in time range)     And   bisacodyl (DULCOLAX) suppository 10 mg (has no administration in time range)   meloxicam (MOBIC) tablet 7.5 mg (7.5 mg Oral Given 3/25/25 2050)   gabapentin (NEURONTIN) capsule 600 mg (600 mg Oral Given 3/25/25 1803)    HYDROcodone-acetaminophen (NORCO) 5-325 MG per tablet 1 tablet (1 tablet Oral Given 3/25/25 1257)       Imaging results:  No radiology results for the last day    Social issues:   Social History     Socioeconomic History    Marital status:    Tobacco Use    Smoking status: Never   Vaping Use    Vaping status: Never Used   Substance and Sexual Activity    Alcohol use: Not Currently    Drug use: Never    Sexual activity: Defer       NIH Stroke Scale:  Interval: (not recorded)  1a. Level of Consciousness: (not recorded)  1b. LOC Questions: (not recorded)  1c. LOC Commands: (not recorded)  2. Best Gaze: (not recorded)  3. Visual: (not recorded)  4. Facial Palsy: (not recorded)  5a. Motor Arm, Left: (not recorded)  5b. Motor Arm, Right: (not recorded)  6a. Motor Leg, Left: (not recorded)  6b. Motor Leg, Right: (not recorded)  7. Limb Ataxia: (not recorded)  8. Sensory: (not recorded)  9. Best Language: (not recorded)  10. Dysarthria: (not recorded)  11. Extinction and Inattention (formerly Neglect): (not recorded)    Total (NIH Stroke Scale): (not recorded)     Additional notable assessment information:     Nursing report ED to floor:      Rena Pena RN   03/25/25 21:00 EDT

## 2025-03-26 NOTE — NURSING NOTE
79-year-old male who presents from home with complaints of increasing pain.  Consult was received to assess patient's sacral buttocks area.  Patient is a rather poor historian.  He states that he thinks there has been some wound removed that he provide has been putting a cream on it.  He reports that he sleeps in his recliner.  Reports that he is continent.    Patient does present with some mild moisture associated dermatitis.  There are areas of excoriation.  There are some areas of erythema that is blanchable at this time to the sacral buttocks area.  Would recommend utilizing a zinc oxide-based cream.  I did leave the patient a chair cushion that he can take home and utilize in his recliner at home to relieve the pressure.  Will sign off at this time

## 2025-03-26 NOTE — H&P
[facility] Observation Unit H&P    Patient Name: Ignacio Miranda  : 1945  MRN: [id number]  Primary Care Physician: Maddie Huang APRN  Date of admission: 3/25/2025     Patient Care Team:  Maddie Huang APRN as PCP - General (Family Medicine)          Subjective   History Present Illness     Chief Complaint:   Chief Complaint   Patient presents with    Leg Pain     Left leg pain    Leg Pain         ED  79-year-old gentleman who comes in from home with his wife at bedside with a history of type 2 diabetes and diabetic peripheral neuropathy who states that he has had increased pain in his left lower     Observation 3/26/25  Pt concurs with er hpi. Pt has had issues with left knee for quite some time. Pt has seen ortho and received injections for pain in knee. Ortho consulted. PT consulted      Review of Systems   Constitutional: Negative for fever.   Respiratory:  Negative for shortness of breath.    Musculoskeletal:         Left knee and leg pain             Personal History     Past Medical History:   Past Medical History:   Diagnosis Date    Diabetes mellitus     Type 2    Diabetic peripheral neuropathy 2022    Essential hypertension 2022    Iron deficiency anemia 2020    Mild depression 2022    Peripheral vascular disease 2022    Restless leg syndrome 2022    Type 2 diabetes mellitus with hyperglycemia 2022       Surgical History:    History reviewed. No pertinent surgical history.        Family History: family history includes No Known Problems in his father and mother. Otherwise pertinent FHx was reviewed and unremarkable.     Social History:  reports that he has never smoked. He does not have any smokeless tobacco history on file. He reports that he does not currently use alcohol. He reports that he does not use drugs.      Medications:  Prior to Admission medications    Medication Sig Start Date End Date Taking? Authorizing Provider   Accu-Chek Softclix  Lancets lancets 1 each by Other route 3 (Three) Times a Day Before Meals. Dx: E11.65. Use as instructed 9/1/24  Yes Halie Tobias MD   atorvastatin (LIPITOR) 20 MG tablet Take 1 tablet by mouth Daily. 5/18/22  Yes Gurwinder Dumont MD   DULoxetine (CYMBALTA) 60 MG capsule Take 1 capsule by mouth Daily.   Yes Gurwinder Dumont MD   gabapentin (NEURONTIN) 600 MG tablet Take 1 tablet by mouth 3 (Three) Times a Day.   Yes Gurwinder Dumont MD   glucose blood (Accu-Chek Guide) test strip 1 each by Other route 3 (Three) Times a Day Before Meals. Dx: E11.65. Use as instructed 9/1/24  Yes Halie Tobias MD   Insulin Aspart, w/Niacinamide, (Fiasp) 100 UNIT/ML solution Inject 30 Units under the skin into the appropriate area as directed Every Morning Before Breakfast.   Yes Gurwinder Dumont MD   Insulin Aspart, w/Niacinamide, (Fiasp) 100 UNIT/ML solution Inject 40 Units under the skin into the appropriate area as directed Daily Before Supper.   Yes Gurwinder Dumont MD   multivitamin with minerals tablet tablet Take 1 tablet by mouth Daily.   Yes Gurwinder Dumont MD   Insulin Glargine, 2 Unit Dial, (Toujeo Max SoloStar) 300 UNIT/ML solution pen-injector injection Inject 20 Units under the skin into the appropriate area as directed every night at bedtime for 30 days.  Patient taking differently: Inject 80 Units under the skin into the appropriate area as directed every night at bedtime. 9/1/24 10/1/24  Halie Tobias MD   insulin lispro (HUMALOG/ADMELOG) 100 UNIT/ML injection Inject 7 Units under the skin into the appropriate area as directed 3 (Three) Times a Day With Meals. Use sliding scale in addition to 7 units TID with meals as below, Blood Glucose 150-199 mg/dL - 2 units Blood Glucose 200-249 mg/dL - 4 units Blood Glucose 250-299 mg/dL - 6 units Blood Glucose 300-349 mg/dL - 7 units Blood Glucose 350-400 mg/dL - 8 units Blood Glucose greater than 400 mg/dL - 9  units 9/1/24   Halie Tobias MD       Allergies:  No Known Allergies    Objective   Objective     Vital Signs  Temp:  [97.6 °F (36.4 °C)-98.1 °F (36.7 °C)] 97.6 °F (36.4 °C)  Heart Rate:  [] 107  Resp:  [15-17] 15  BP: (116-149)/(60-94) 121/60  SpO2:  [91 %-98 %] 96 %  on   ;   Device (Oxygen Therapy): room air  Body mass index is 35.47 kg/m².    Physical Exam  Constitutional:       Appearance: Normal appearance.   Musculoskeletal:      Comments: Decreased rom and tenderness to palp   Neurological:      General: No focal deficit present.      Mental Status: He is alert and oriented to person, place, and time. Mental status is at baseline.   Psychiatric:         Mood and Affect: Mood normal.         Behavior: Behavior normal.         Results Review:  I have personally reviewed most recent cardiac tracings, lab results, and radiology images and interpretations and agree with findings, most notably: cbc, cmp, troponin, left knee xray, EKG, us left leg.    Results from last 7 days   Lab Units 03/26/25  0539   WBC 10*3/mm3 8.25   HEMOGLOBIN g/dL 13.1   HEMATOCRIT % 42.6   PLATELETS 10*3/mm3 146     Results from last 7 days   Lab Units 03/26/25  0432 03/25/25  1914 03/25/25  1808   SODIUM mmol/L 137  --  140   POTASSIUM mmol/L 4.5  --  3.8   CHLORIDE mmol/L 100  --  100   CO2 mmol/L 26.1  --  26.3   BUN mg/dL 11  --  13   CREATININE mg/dL 0.97  --  1.11   GLUCOSE mg/dL 149*  --  200*   CALCIUM mg/dL 9.2  --  9.6   ALK PHOS U/L  --   --  113   ALT (SGPT) U/L  --   --  16   AST (SGOT) U/L  --   --  25   HSTROP T ng/L  --  12 11     Estimated Creatinine Clearance: 75.1 mL/min (by C-G formula based on SCr of 0.97 mg/dL).  Brief Urine Lab Results  (Last result in the past 365 days)        Color   Clarity   Blood   Leuk Est   Nitrite   Protein   CREAT   Urine HCG        08/28/24 1401 Dark Yellow   Clear   Negative   Trace   Negative   Negative                   Microbiology Results (last 10 days)       ** No  results found for the last 240 hours. **            ECG/EMG Results (most recent)       Procedure Component Value Units Date/Time    ECG 12 Lead Syncope [[id number]] Collected: 03/25/25 1834     Updated: 03/26/25 0639     QT Interval 342 ms      QTC Interval 442 ms     Narrative:      HEART UEDJ=702  bpm  RR Wriqvzaq=450  ms  DC Nrozkhbm=236  ms  P Horizontal Axis=25  deg  P Front Axis=42  deg  QRSD Interval=89  ms  QT Udaetfnl=458  ms  TCcJ=544  ms  QRS Axis=-58  deg  T Wave Axis=23  deg  - ABNORMAL ECG -  Sinus tachycardia  Borderline  prolonged DC interval  Left anterior fascicular block  Anterior  infarct, old  When compared with ECG of 28-Aug-2024 17:11:55,  New or worsened ischemia or infarction  Electronically Signed By: Hema Willis ([facility]) 2025-03-26 06:39:12  Date and Time of Study:2025-03-25 18:34:32            Results for orders placed during the hospital encounter of 03/25/25    Duplex Venous Lower Extremity - Left    Interpretation Summary    Normal left lower extremity venous duplex scan.    Left Baker's cyst.      Results for orders placed during the hospital encounter of 02/22/22    Adult Transthoracic Echo Complete W/ Cont if Necessary Per Protocol    Interpretation Summary  · Left ventricular wall thickness is consistent with mild concentric hypertrophy.  · Calculated left ventricular EF = 60% Estimated left ventricular EF was in agreement with the calculated left ventricular EF.  · There is calcification of the aortic valve.    Transthoracic echocardiography is a technically difficult study.  EF is more than 60%.  Mild to moderate left atrial enlargement.  No effusion.      Electronically signed by Aba Frost MD, 02/24/22, 7:22 PM EST.      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 No evidence of acute fracture. Electronically Signed: Robert Elam MD  3/26/2025 1:11 PM EDT  Workstation ID:  [id number]        Estimated Creatinine Clearance: 75.1 mL/min (by C-G formula based on SCr of 0.97 mg/dL).    Assessment & Plan   Assessment/Plan       Active Hospital Problems   No active problems to display.      Resolved Hospital Problems    Diagnosis Date Resolved POA    **Left leg pain [M79.605] 03/26/2025 Yes    Non-pressure chronic ulcer of left lower leg with fat layer exposed [L97.922] 03/26/2025 Yes       Left knee pain  - cbc, cmp, troponin are unremarkable  - us LLE normal, bakers cyst  - ortho consulted  - left knee xray reviewed and showing severe tricompartmental degenerative changes of the left knee. No evidence of acute fracture.   - cont neurontin      Diabetes mellitus  -moderately controlled   Lab Results   Component Value Date    GLUCOSE 149 (H) 03/26/2025    GLUCOSE 200 (H) 03/25/2025    GLUCOSE 143 (H) 09/10/2024    GLUCOSE 228 (H) 09/02/2024     -lantus, ssi  -Diabetic diet  -Monitor before meals and at bedtime       VTE Prophylaxis - Active VTE Prophylaxis:  Pharmacologic:        Start     Dose Route Frequency Stop    03/25/25 1925  enoxaparin sodium (LOVENOX) syringe 40 mg         40 mg SC Daily --                  Select Mechanical VTE Prophylaxis if Desired & Appropriate      CODE STATUS:    Code Status and Medical Interventions: CPR (Attempt to Resuscitate); Full Support   Ordered at: 03/25/25 1916     Code Status (Patient has no pulse and is not breathing):    CPR (Attempt to Resuscitate)     Medical Interventions (Patient has pulse or is breathing):    Full Support     Level Of Support Discussed With:    Patient       This patient has been examined wearing personal protective equipment.     I discussed the patient's findings and my recommendations with patient and nursing staff.      Signature:Electronically signed by Beba Vincent PA-C, 03/26/25, 4:23 PM EDT.

## 2025-03-26 NOTE — CASE MANAGEMENT/SOCIAL WORK
Discharge Planning Assessment   [facility]     Patient Name: Ignacio Miranda  MRN: [id number]  Today's Date: 3/26/2025    Admit Date: 3/25/2025    Plan: From home with spouse and adult disabled son. DC disposition pending Ortho and PT consults   Discharge Needs Assessment       Row Name 03/26/25 1526       Living Environment    People in Home child(kath), adult;spouse    Name(s) of People in Home spouse, Cesilia; adult disabled sonMoshe    Current Living Arrangements home    Potentially Unsafe Housing Conditions unable to assess  cannot access bathroom    In the past 12 months has the electric, gas, oil, or water company threatened to shut off services in your home? No    Primary Care Provided by self    Provides Primary Care For no one, unable/limited ability to care for self    Family Caregiver if Needed spouse    Family Caregiver Names spouse, Cesilia. SonMoshe and sohamrMaxine    Quality of Family Relationships helpful;involved    Able to Return to Prior Arrangements yes    Living Arrangement Comments Moshe acharya is disabled and limited help physically. Calls Fire Department to pick him up when he falls       Resource/Environmental Concerns    Resource/Environmental Concerns none    Transportation Concerns none       Transportation Needs    In the past 12 months, has lack of transportation kept you from medical appointments or from getting medications? no    In the past 12 months, has lack of transportation kept you from meetings, work, or from getting things needed for daily living? No       Food Insecurity    Within the past 12 months, you worried that your food would run out before you got the money to buy more. Never true    Within the past 12 months, the food you bought just didn't last and you didn't have money to get more. Never true       Transition Planning    Patient/Family Anticipates Transition to home with family    Patient/Family Anticipated Services at Transition skilled nursing    Transportation  Anticipated family or friend will provide       Discharge Needs Assessment    Readmission Within the Last 30 Days no previous admission in last 30 days    Equipment Currently Used at Home oxygen;walker, rolling;wheelchair;shower chair    Concerns to be Addressed discharge planning    Do you want help finding or keeping work or a job? I do not need or want help    Do you want help with school or training? For example, starting or completing job training or getting a high school diploma, GED or equivalent No    Anticipated Changes Related to Illness none    Equipment Needed After Discharge none    Outpatient/Agency/Support Group Needs skilled nursing facility;homecare agency                   Discharge Plan       Row Name 03/26/25 1523       Plan    Plan From home with spouse and adult disabled son. DC disposition pending Ortho and PT consults    Patient/Family in Agreement with Plan yes    Plan Comments Barriers: Pending Ortho consult. Pending PT and OT anival. CM met with  and Mrs. Miranda. He is a/o and they reside together with an adult son who is disabled and is limited on helping physically. Mr. Miranda reported he has had frequent falls and calls the fire department to pick him up.  He sleeps in the recliner and has limited mobility and uses a rolling walker. He cannot get into the bathroom but they are looking into a different rental house that is accessible. PCP and Pharmacy confirmed. He had [facility] for PT  until January and agrees to use them again if recommended. He would like a bedside  commode when her returns home.   They denied any financial concerns                       Demographic Summary       Row Name 03/26/25 1529       General Information    Admission Type observation    Arrived From emergency department    Required Notices Provided Observation Status Notice    Referral Source admission list    Reason for Consult discharge planning    Preferred Language English       Contact Information     Permission Granted to Share Info With                    Functional Status       Row Name 03/26/25 1525       Functional Status    Usual Activity Tolerance moderate    Current Activity Tolerance fair       Functional Status, IADL    Medications assistive person    Meal Preparation completely dependent    Housekeeping completely dependent    Laundry completely dependent    Shopping completely dependent    If for any reason you need help with day-to-day activities such as bathing, preparing meals, shopping, managing finances, etc., do you get the help you need? I need a lot more help    IADL Comments recently cannot get into bathroom       Employment/    Employment Status retired                          Cesilia KELLER,RN Case Manager  [facility]  Phone: Desk- [phone] cell- [phone]     1.  Name of PCP: Arianne Malcolm  2.  PCP Contacted on Admission: [ ] Y    [ ] N    3.  PCP contacted at Discharge: [ ] Y    [ ] N    [ ] N/A  4.  Post-Discharge Appointment Date and Location:  5.  Summary of Handoff given to PCP: EKG: no acute changes;   c/w ASA, statin via PEG once confirmed  Restart antihypertensives once PEG confirmed

## 2025-03-26 NOTE — PLAN OF CARE
Goal Outcome Evaluation:              Outcome Evaluation: Pt is a 79 y.o. year old male brought to hospital 3/25/25 with severe left leg pain and numbness, worsening diabetic neuropathy and unable to bear weight through LLE. Pt also reports a syncopal episode at home one day prior. Doppler of LLE shows no DVT and no SVT and did find cystic mass behind L knee. 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 No evidence of acute fracture. Pt has had multiple falls over the past two days.    Pmhx significant for obesity and T2DM.    At baseline, pt lives with his spouse and son in [facility] with 4 steps to enter. Pt's son assists with steps to enter home. Pt requires assistance with bathing, dressing, toileting at baseline. Pt is IND with mobility and short distances in home with RW and uses w/c out of home. Pt sleeps in a lift chair and reports he spends most of his time in the chair.     Upon OT evaluation, pt negative for orthostatic BP but does report sometimes he has dizziness when he is standing. Pt completed functional transfers out of bed with min A x2 with elevated bed height and arm rests on chair used. Pt was able to walk approx 4 feet with RW and min A x2 with pt reporting increased pain in LLE with standing and walking. Pt heavily WB onto RW during walking. Pt requires max A for donning shoes and removing shoes. Pt unable to manage gown around back in standing and requires Genaro for standing balance without BUE support. Pt's spouse states she is worried about pt returning home due to falls hx and she is unable to provide physical assist with transfers and mobility. Pt is below baseline and will benefit from SNF at d/c due to high fall risk and not safe to d/c home.    Anticipated Discharge Disposition (OT): skilled nursing facility

## 2025-03-26 NOTE — PLAN OF CARE
Goal Outcome Evaluation:  Plan of Care Reviewed With: patient           Outcome Evaluation: 78 y/o M who presented with recurrent falls over the last 2 days and severe L knee pain in weight bearing. 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 No evidence of acute fracture. (-) for DVT.  ultrasound noted popliteal fossa fluid collection, L knee. Has ortho consult pending. Patient lives in a one story home with 4 PINA with spouse and son. Spouse and son are not able to physically assist him. He only ambulates to the bathroom with RW, otherwise he stays in lift chair. He also sleeps in his lift chair. Patient was mod I with bed mobility, min A of 2 to stand with RW from elevated bed. Min A of 2 to transfer to recliner, min A of 2 to ambulate 4' with RW. Distance limited by pain and fatigue. Patient does not have assist available at home. Continues to be at high risk for falls based on increased assist needed. Recommending SNF at d/c.    Anticipated Discharge Disposition (PT): skilled nursing facility

## 2025-03-26 NOTE — CONSULTS
ORTHOPEDIC SURGERY CONSULT      Patient: Ignacio Miranda  Date of Admission: 3/25/2025  5:06 PM  YOB: 1945  Medical Record Number: [id number]  Attending Physician: Hema Willis MD  Consulting Physician: Samuel Diaz MD    CHIEF COMPLAINT: Left knee pain    HISTORY OF PRESENT ILLINESS: Patient is a 79 y.o. year old male presents to [facility] with above complaints.  I was consulted for further evaluation and treatment. He has had knee pain in both knees for years.  He has had three falls recently and has had pain in the left knee in the medial knee primarily with weight bearing.  He has a follow up with his ortho next month for his next gel injection.      ALLERGIES: No Known Allergies    HOME MEDICATIONS:  Medications Prior to Admission   Medication Sig Dispense Refill Last Dose/Taking    Accu-Chek Softclix Lancets lancets 1 each by Other route 3 (Three) Times a Day Before Meals. Dx: E11.65. Use as instructed 100 each 2 3/25/2025    atorvastatin (LIPITOR) 20 MG tablet Take 1 tablet by mouth Daily.   3/25/2025    DULoxetine (CYMBALTA) 60 MG capsule Take 1 capsule by mouth Daily.   3/25/2025    gabapentin (NEURONTIN) 600 MG tablet Take 1 tablet by mouth 3 (Three) Times a Day.   3/25/2025    glucose blood (Accu-Chek Guide) test strip 1 each by Other route 3 (Three) Times a Day Before Meals. Dx: E11.65. Use as instructed 100 each 2 3/25/2025    Insulin Aspart, w/Niacinamide, (Fiasp) 100 UNIT/ML solution Inject 30 Units under the skin into the appropriate area as directed Every Morning Before Breakfast.   Taking    Insulin Aspart, w/Niacinamide, (Fiasp) 100 UNIT/ML solution Inject 40 Units under the skin into the appropriate area as directed Daily Before Supper.   Taking    multivitamin with minerals tablet tablet Take 1 tablet by mouth Daily.   3/25/2025    Insulin Glargine, 2 Unit Dial, (Toujeo Max SoloStar) 300 UNIT/ML solution pen-injector injection Inject 20 Units under the  skin into the appropriate area as directed every night at bedtime for 30 days. (Patient taking differently: Inject 80 Units under the skin into the appropriate area as directed every night at bedtime.)       insulin lispro (HUMALOG/ADMELOG) 100 UNIT/ML injection Inject 7 Units under the skin into the appropriate area as directed 3 (Three) Times a Day With Meals. Use sliding scale in addition to 7 units TID with meals as below, Blood Glucose 150-199 mg/dL - 2 units Blood Glucose 200-249 mg/dL - 4 units Blood Glucose 250-299 mg/dL - 6 units Blood Glucose 300-349 mg/dL - 7 units Blood Glucose 350-400 mg/dL - 8 units Blood Glucose greater than 400 mg/dL - 9 units          CURRENT MEDICATIONS:  Scheduled Meds:[START ON 3/27/2025] atorvastatin, 20 mg, Oral, Daily  DULoxetine, 60 mg, Oral, Daily  enoxaparin sodium, 40 mg, Subcutaneous, Daily  gabapentin, 600 mg, Oral, Q8H  insulin glargine, 60 Units, Subcutaneous, Nightly  insulin lispro, 2-9 Units, Subcutaneous, 4x Daily AC & at Bedtime  meloxicam, 7.5 mg, Oral, Daily  sodium chloride, 10 mL, Intravenous, Q12H  Zinc Oxide, , Apply externally, BID      Continuous Infusions:   PRN Meds:.  senna-docusate sodium **AND** polyethylene glycol **AND** bisacodyl **AND** bisacodyl    dextrose    dextrose    glucagon (human recombinant)    influenza vaccine    melatonin    ondansetron    [COMPLETED] Insert Peripheral IV **AND** sodium chloride    sodium chloride    sodium chloride    Past Medical History:   Diagnosis Date    Diabetes mellitus 2001    Type 2    Diabetic peripheral neuropathy 2/22/2022    Essential hypertension 2/22/2022    Iron deficiency anemia 7/24/2020    Mild depression 2/22/2022    Peripheral vascular disease 2/23/2022    Restless leg syndrome 2/22/2022    Type 2 diabetes mellitus with hyperglycemia 2/22/2022     History reviewed. No pertinent surgical history.  Social History     Occupational History    Not on file   Tobacco Use    Smoking status: Never     Smokeless tobacco: Not on file   Vaping Use    Vaping status: Never Used   Substance and Sexual Activity    Alcohol use: Not Currently    Drug use: Never    Sexual activity: Defer      Social History     Social History Narrative    Not on file     Family History   Problem Relation Age of Onset    No Known Problems Mother     No Known Problems Father        REVIEW OF SYSTEMS:   12 point ROS is negative except as above     PHYSICAL EXAM:   Vitals:  Vitals:    03/26/25 0330 03/26/25 0843 03/26/25 1309 03/26/25 1637   BP: 141/75 149/65 121/60 119/70   BP Location: Right arm Right arm Right arm Right arm   Patient Position: Lying Lying Sitting Lying   Pulse: 90 96 107 98   Resp: 16 15 15 17   Temp: 97.8 °F (36.6 °C) 97.8 °F (36.6 °C) 97.6 °F (36.4 °C) 97.6 °F (36.4 °C)   TempSrc: Oral Oral Oral Oral   SpO2: 96% 98% 96% 93%   Weight: 109 kg (240 lb 4.8 oz)      Height:            GENERAL: no distress   HEENT: normocephalic   CV: No audible murmur   Resp: no audible wheezes   RLE: No tenderness to the knee   LLE: Mild swelling.  Tenderness to the medial knee.  No erythema.  No pain with ROM of the left hip.      DIAGNOSTIC TEST:  Admission on 03/25/2025   Component Date Value Ref Range Status    Right Common Femoral Spont 03/25/2025 Y   Final    Right Common Femoral Competent 03/25/2025 Y   Final    Right Common Femoral Phasic 03/25/2025 Y   Final    Right Common Femoral Compress 03/25/2025 C   Final    Right Common Femoral Augment 03/25/2025 Y   Final    Left Common Femoral Spont 03/25/2025 Y   Final    Left Common Femoral Competent 03/25/2025 Y   Final    Left Common Femoral Phasic 03/25/2025 Y   Final    Left Common Femoral Compress 03/25/2025 C   Final    Left Common Femoral Augment 03/25/2025 Y   Final    Left Saphenofemoral Junction Compr* 03/25/2025 C   Final    Left Proximal Femoral Compress 03/25/2025 C   Final    Left Mid Femoral Spont 03/25/2025 Y   Final    Left Mid Femoral Competent 03/25/2025 Y   Final     Left Mid Femoral Phasic 03/25/2025 Y   Final    Left Mid Femoral Compress 03/25/2025 C   Final    Left Mid Femoral Augment 03/25/2025 Y   Final    Left Distal Femoral Compress 03/25/2025 C   Final    Left Popliteal Spont 03/25/2025 Y   Final    Left Popliteal Competent 03/25/2025 Y   Final    Left Popliteal Phasic 03/25/2025 Y   Final    Left Popliteal Compress 03/25/2025 C   Final    Left Popliteal Augment 03/25/2025 Y   Final    Left Posterior Tibial Compress 03/25/2025 C   Final    Left Peroneal Compress 03/25/2025 C   Final    Left Gastronemius Compress 03/25/2025 C   Final    Left Greater Saph AK Compress 03/25/2025 C   Final    Left Greater Saph BK Compress 03/25/2025 C   Final    BH CV VAS PRELIMINARY FINDINGS SCR* 03/25/2025 1.0   Final     CV POP FLUID COLLECT LEFT 03/25/2025 1.0   Final    Left Profunda Femoral Compress 03/25/2025 C   Final    Left Lesser Saph Compress 03/25/2025 C   Final    Glucose 03/25/2025 200 (H)  65 - 99 mg/dL Final    BUN 03/25/2025 13  8 - 23 mg/dL Final    Creatinine 03/25/2025 1.11  0.76 - 1.27 mg/dL Final    Sodium 03/25/2025 140  136 - 145 mmol/L Final    Potassium 03/25/2025 3.8  3.5 - 5.2 mmol/L Final    Chloride 03/25/2025 100  98 - 107 mmol/L Final    CO2 03/25/2025 26.3  22.0 - 29.0 mmol/L Final    Calcium 03/25/2025 9.6  8.6 - 10.5 mg/dL Final    Total Protein 03/25/2025 7.6  6.0 - 8.5 g/dL Final    Albumin 03/25/2025 4.0  3.5 - 5.2 g/dL Final    ALT (SGPT) 03/25/2025 16  1 - 41 U/L Final    AST (SGOT) 03/25/2025 25  1 - 40 U/L Final    Alkaline Phosphatase 03/25/2025 113  39 - 117 U/L Final    Total Bilirubin 03/25/2025 0.3  0.0 - 1.2 mg/dL Final    Globulin 03/25/2025 3.6  gm/dL Final    A/G Ratio 03/25/2025 1.1  g/dL Final    BUN/Creatinine Ratio 03/25/2025 11.7  7.0 - 25.0 Final    Anion Gap 03/25/2025 13.7  5.0 - 15.0 mmol/L Final    eGFR 03/25/2025 67.5  >60.0 mL/min/1.73 Final    QT Interval 03/25/2025 342  ms Final    QTC Interval 03/25/2025 442  ms Final    HS  Troponin T 03/25/2025 11  <22 ng/L Final    WBC 03/25/2025 8.38  3.40 - 10.80 10*3/mm3 Final    RBC 03/25/2025 4.35  4.14 - 5.80 10*6/mm3 Final    Hemoglobin 03/25/2025 13.0  13.0 - 17.7 g/dL Final    Hematocrit 03/25/2025 42.2  37.5 - 51.0 % Final    MCV 03/25/2025 97.0  79.0 - 97.0 fL Final    MCH 03/25/2025 29.9  26.6 - 33.0 pg Final    MCHC 03/25/2025 30.8 (L)  31.5 - 35.7 g/dL Final    RDW 03/25/2025 14.5  12.3 - 15.4 % Final    RDW-SD 03/25/2025 52.2  37.0 - 54.0 fl Final    MPV 03/25/2025 11.1  6.0 - 12.0 fL Final    Platelets 03/25/2025 191  140 - 450 10*3/mm3 Final    Neutrophil % 03/25/2025 66.0  42.7 - 76.0 % Final    Lymphocyte % 03/25/2025 19.8  19.6 - 45.3 % Final    Monocyte % 03/25/2025 10.5  5.0 - 12.0 % Final    Eosinophil % 03/25/2025 2.7  0.3 - 6.2 % Final    Basophil % 03/25/2025 0.6  0.0 - 1.5 % Final    Immature Grans % 03/25/2025 0.4  0.0 - 0.5 % Final    Neutrophils, Absolute 03/25/2025 5.53  1.70 - 7.00 10*3/mm3 Final    Lymphocytes, Absolute 03/25/2025 1.66  0.70 - 3.10 10*3/mm3 Final    Monocytes, Absolute 03/25/2025 0.88  0.10 - 0.90 10*3/mm3 Final    Eosinophils, Absolute 03/25/2025 0.23  0.00 - 0.40 10*3/mm3 Final    Basophils, Absolute 03/25/2025 0.05  0.00 - 0.20 10*3/mm3 Final    Immature Grans, Absolute 03/25/2025 0.03  0.00 - 0.05 10*3/mm3 Final    nRBC 03/25/2025 0.0  0.0 - 0.2 /100 WBC Final    Extra Tube 03/25/2025 Hold for add-ons.   Final    Auto resulted.    Extra Tube 03/25/2025 Hold for add-ons.   Final    Auto resulted    HS Troponin T 03/25/2025 12  <22 ng/L Final    Troponin T Numeric Delta 03/25/2025 1  Abnormal if >/=3 ng/L Final    Glucose 03/26/2025 149 (H)  65 - 99 mg/dL Final    BUN 03/26/2025 11  8 - 23 mg/dL Final    Creatinine 03/26/2025 0.97  0.76 - 1.27 mg/dL Final    Sodium 03/26/2025 137  136 - 145 mmol/L Final    Potassium 03/26/2025 4.5  3.5 - 5.2 mmol/L Final    Specimen hemolyzed.  Result may be falsely elevated.    Chloride 03/26/2025 100  98 - 107  mmol/L Final    CO2 03/26/2025 26.1  22.0 - 29.0 mmol/L Final    Calcium 03/26/2025 9.2  8.6 - 10.5 mg/dL Final    BUN/Creatinine Ratio 03/26/2025 11.3  7.0 - 25.0 Final    Anion Gap 03/26/2025 10.9  5.0 - 15.0 mmol/L Final    eGFR 03/26/2025 79.4  >60.0 mL/min/1.73 Final    WBC 03/26/2025 8.25  3.40 - 10.80 10*3/mm3 Final    RBC 03/26/2025 4.37  4.14 - 5.80 10*6/mm3 Final    Hemoglobin 03/26/2025 13.1  13.0 - 17.7 g/dL Final    Hematocrit 03/26/2025 42.6  37.5 - 51.0 % Final    MCV 03/26/2025 97.5 (H)  79.0 - 97.0 fL Final    MCH 03/26/2025 30.0  26.6 - 33.0 pg Final    MCHC 03/26/2025 30.8 (L)  31.5 - 35.7 g/dL Final    RDW 03/26/2025 14.4  12.3 - 15.4 % Final    RDW-SD 03/26/2025 51.9  37.0 - 54.0 fl Final    MPV 03/26/2025 10.9  6.0 - 12.0 fL Final    Platelets 03/26/2025 146  140 - 450 10*3/mm3 Final    Neutrophil % 03/26/2025 60.9  42.7 - 76.0 % Final    Lymphocyte % 03/26/2025 24.4  19.6 - 45.3 % Final    Monocyte % 03/26/2025 10.3  5.0 - 12.0 % Final    Eosinophil % 03/26/2025 3.5  0.3 - 6.2 % Final    Basophil % 03/26/2025 0.5  0.0 - 1.5 % Final    Immature Grans % 03/26/2025 0.4  0.0 - 0.5 % Final    Neutrophils, Absolute 03/26/2025 5.03  1.70 - 7.00 10*3/mm3 Final    Lymphocytes, Absolute 03/26/2025 2.01  0.70 - 3.10 10*3/mm3 Final    Monocytes, Absolute 03/26/2025 0.85  0.10 - 0.90 10*3/mm3 Final    Eosinophils, Absolute 03/26/2025 0.29  0.00 - 0.40 10*3/mm3 Final    Basophils, Absolute 03/26/2025 0.04  0.00 - 0.20 10*3/mm3 Final    Immature Grans, Absolute 03/26/2025 0.03  0.00 - 0.05 10*3/mm3 Final    nRBC 03/26/2025 0.0  0.0 - 0.2 /100 WBC Final    Glucose 03/26/2025 300 (H)  70 - 105 mg/dL Final    Serial Number: [id number]:  [id number]       XR Knee 3 View Left  Result Date: 3/26/2025  Narrative: XR KNEE 3 VW LEFT Date of Exam: 3/26/2025 11:45 AM EDT Indication: left leg pain Comparison: None available. Findings: No evidence of acute fracture. There is severe tricompartmental degenerative  changes with joint space narrowing. Mild lateral subluxation of the tibia relative to the femur. No significant joint effusion. Atherosclerotic calcifications are present.     Impression: Impression: Severe tricompartmental degenerative changes of the left knee. No evidence of acute fracture. Electronically Signed: Robert Elam MD  3/26/2025 1:11 PM EDT  Workstation ID: [id number]    Duplex Venous Lower Extremity - Left  Result Date: 3/25/2025  Narrative:   Normal left lower extremity venous duplex scan.   Left Baker's cyst.     AP and lateral views of the left knee show severe osteoarthritis    ASSESSMENT:  Mr. Miranda is a 79 year old male with an exacerbation of left knee pain    * No active hospital problems. *      PLAN:    I discussed with the patient and his family that I do not see any fractures.  He does have severe arthritis.  They asked about a knee brace.  We discussed that here I only know of knee immobilizers.  A knee brace would normally be as an outpatient.  He can weight bear as tolerated.  No surgical indication now.      I discussed with the patient that he can follow up with his orthopedist or if he wants another opinion of whether he can get a knee replacement he can see my partner Dr. Garcia in the office.  If he decides to do that he can call [phone] for an appointment.    The above diagnosis and treatment plan was discussed with the patient.  They were educated in treatment options for their condition.   They were given the opportunity to ask questions and were answered to their satisfaction.  They agreed to proceed with the above treatment plan.        Samuel Diaz MD  Date: 3/26/2025

## 2025-03-27 VITALS
DIASTOLIC BLOOD PRESSURE: 90 MMHG | SYSTOLIC BLOOD PRESSURE: 157 MMHG | RESPIRATION RATE: 14 BRPM | TEMPERATURE: 97.9 F | WEIGHT: 240.3 LBS | HEART RATE: 90 BPM | OXYGEN SATURATION: 99 % | BODY MASS INDEX: 35.59 KG/M2 | HEIGHT: 69 IN

## 2025-03-27 LAB
ANION GAP SERPL CALCULATED.3IONS-SCNC: 11.4 MMOL/L (ref 5–15)
BASOPHILS # BLD AUTO: 0.05 10*3/MM3 (ref 0–0.2)
BASOPHILS NFR BLD AUTO: 0.7 % (ref 0–1.5)
BUN SERPL-MCNC: 13 MG/DL (ref 8–23)
BUN/CREAT SERPL: 12.6 (ref 7–25)
CALCIUM SPEC-SCNC: 9.6 MG/DL (ref 8.6–10.5)
CHLORIDE SERPL-SCNC: 100 MMOL/L (ref 98–107)
CO2 SERPL-SCNC: 27.6 MMOL/L (ref 22–29)
CREAT SERPL-MCNC: 1.03 MG/DL (ref 0.76–1.27)
DEPRECATED RDW RBC AUTO: 51.2 FL (ref 37–54)
EGFRCR SERPLBLD CKD-EPI 2021: 73.9 ML/MIN/1.73
EOSINOPHIL # BLD AUTO: 0.26 10*3/MM3 (ref 0–0.4)
EOSINOPHIL NFR BLD AUTO: 3.6 % (ref 0.3–6.2)
ERYTHROCYTE [DISTWIDTH] IN BLOOD BY AUTOMATED COUNT: 14.4 % (ref 12.3–15.4)
GLUCOSE BLDC GLUCOMTR-MCNC: 133 MG/DL (ref 70–105)
GLUCOSE BLDC GLUCOMTR-MCNC: 258 MG/DL (ref 70–105)
GLUCOSE SERPL-MCNC: 133 MG/DL (ref 65–99)
HCT VFR BLD AUTO: 41.1 % (ref 37.5–51)
HGB BLD-MCNC: 12.7 G/DL (ref 13–17.7)
IMM GRANULOCYTES # BLD AUTO: 0.01 10*3/MM3 (ref 0–0.05)
IMM GRANULOCYTES NFR BLD AUTO: 0.1 % (ref 0–0.5)
LYMPHOCYTES # BLD AUTO: 2.28 10*3/MM3 (ref 0.7–3.1)
LYMPHOCYTES NFR BLD AUTO: 31.9 % (ref 19.6–45.3)
MCH RBC QN AUTO: 29.9 PG (ref 26.6–33)
MCHC RBC AUTO-ENTMCNC: 30.9 G/DL (ref 31.5–35.7)
MCV RBC AUTO: 96.7 FL (ref 79–97)
MONOCYTES # BLD AUTO: 0.77 10*3/MM3 (ref 0.1–0.9)
MONOCYTES NFR BLD AUTO: 10.8 % (ref 5–12)
NEUTROPHILS NFR BLD AUTO: 3.77 10*3/MM3 (ref 1.7–7)
NEUTROPHILS NFR BLD AUTO: 52.9 % (ref 42.7–76)
NRBC BLD AUTO-RTO: 0 /100 WBC (ref 0–0.2)
PLATELET # BLD AUTO: 163 10*3/MM3 (ref 140–450)
PMV BLD AUTO: 10.9 FL (ref 6–12)
POTASSIUM SERPL-SCNC: 4.2 MMOL/L (ref 3.5–5.2)
RBC # BLD AUTO: 4.25 10*6/MM3 (ref 4.14–5.8)
SODIUM SERPL-SCNC: 139 MMOL/L (ref 136–145)
WBC NRBC COR # BLD AUTO: 7.14 10*3/MM3 (ref 3.4–10.8)

## 2025-03-27 PROCEDURE — 63710000001 INSULIN LISPRO (HUMAN) PER 5 UNITS: Performed by: PHYSICIAN ASSISTANT

## 2025-03-27 PROCEDURE — 80048 BASIC METABOLIC PNL TOTAL CA: CPT | Performed by: PHYSICIAN ASSISTANT

## 2025-03-27 PROCEDURE — 82948 REAGENT STRIP/BLOOD GLUCOSE: CPT | Performed by: PHYSICIAN ASSISTANT

## 2025-03-27 PROCEDURE — 97530 THERAPEUTIC ACTIVITIES: CPT

## 2025-03-27 PROCEDURE — 85025 COMPLETE CBC W/AUTO DIFF WBC: CPT | Performed by: PHYSICIAN ASSISTANT

## 2025-03-27 PROCEDURE — G0378 HOSPITAL OBSERVATION PER HR: HCPCS

## 2025-03-27 PROCEDURE — 97116 GAIT TRAINING THERAPY: CPT

## 2025-03-27 RX ORDER — TRAMADOL HYDROCHLORIDE 50 MG/1
50 TABLET ORAL EVERY 6 HOURS PRN
Qty: 12 TABLET | Refills: 0 | Status: SHIPPED | OUTPATIENT
Start: 2025-03-27

## 2025-03-27 RX ADMIN — Medication: at 09:23

## 2025-03-27 RX ADMIN — GABAPENTIN 600 MG: 300 CAPSULE ORAL at 14:56

## 2025-03-27 RX ADMIN — DULOXETINE 60 MG: 30 CAPSULE, DELAYED RELEASE ORAL at 09:24

## 2025-03-27 RX ADMIN — INSULIN LISPRO 6 UNITS: 100 INJECTION, SOLUTION INTRAVENOUS; SUBCUTANEOUS at 14:56

## 2025-03-27 RX ADMIN — ATORVASTATIN CALCIUM 20 MG: 20 TABLET, FILM COATED ORAL at 09:24

## 2025-03-27 RX ADMIN — GABAPENTIN 600 MG: 300 CAPSULE ORAL at 05:12

## 2025-03-27 RX ADMIN — MELOXICAM 7.5 MG: 15 TABLET ORAL at 09:24

## 2025-03-27 RX ADMIN — Medication 10 ML: at 09:23

## 2025-03-27 NOTE — CASE MANAGEMENT/SOCIAL WORK
Continued Stay Note  [facility]     Patient Name: Ignacio Miranda  MRN: [id number]  Today's Date: 3/27/2025    Admit Date: 3/25/2025    Plan: From home with spouse and adult disabled son. Referred to [facility]; acceptance pending.  Referred to [employer] for wheelchair, sliding board and bedside commode   Discharge Plan       Row Name 03/27/25 1149       Plan    Plan From home with spouse and adult disabled son. Referred to [facility]; acceptance pending.  Referred to [employer] for wheelchair, sliding board and bedside commode    Plan Comments CM met with Mr. Miranda and called his wife about discharge home today with home health and explained that he does not meet critieria for inpatient.  Per their request CM made a referral to  [facility]; acceptance pending.  CM made referral to [place] with [employer] for a wheelchair, sliding board and bedside commode and asked provider for orders and supporting documentation.  Per Mrs. Miranda, their daughter and son-in law will  pick him up this afternoon                             Cesilia KELLER,RN Case Manager  [facility]  Phone: Desk- [phone] cell- [phone]

## 2025-03-27 NOTE — DISCHARGE PLACEMENT REQUEST
"Joon Covarrubias (79 y.o. Male)       Date of Birth   1945    Social Security Number       Address   [facility] [address] [place] [facility]    Home Phone   [phone]    MRN   [id number]       [facility]    Marital Status                               Admission Date   3/25/2025    Admission Type   Emergency    Admitting Provider   Hema Willis MD    Attending Provider   Hema Willis MD    Department, Room/Bed   [facility] OBSERVATION, 234/1       Discharge Date       Discharge Disposition       Discharge Destination                                 Attending Provider: Hema Willis MD    Allergies: No Known Allergies    Isolation: None   Infection: None   Code Status: CPR    Ht: 175.3 cm (69.02\")   Wt: 109 kg (240 lb 4.8 oz)    Admission Cmt: None   Principal Problem: Left leg pain [M79.605]                   Active Insurance as of 3/25/2025       Primary Coverage       Payor Plan Insurance Group Employer/Plan Group    MEDICARE RAILROAD MEDICARE        Payor Plan Address Payor Plan Phone Number Payor Plan Fax Number Effective Dates    [address] [phone]  1/1/2001 - None Entered    [place] [id number]         Subscriber Name Subscriber Birth Date Member ID       JOON COVARRUBIAS 1945 [id number]               Secondary Coverage       Payor Plan Insurance Group Employer/Plan Group    [facility] [id number]       Payor Plan Address Payor Plan Phone Number Payor Plan Fax Number Effective Dates    [address] [address] [phone]  1/1/2022 - None Entered    [facility] [address]         Subscriber Name Subscriber Birth Date Member ID       JOON COVARRUBIAS 1945 [id number]                     Emergency Contacts        (Rel.) Home Phone Work Phone Mobile Phone    RODRIGO PENA (Daughter) -- -- [phone]    AGUILAR COVARRUBIAS (Spouse) [phone] --    MAHAJANNANCY (Son) [phone] -- --      "         Insurance Information                  MEDICARE/RAILROAD MEDICARE Phone: [phone]    Subscriber: Ignacio Miranda Subscriber#: [id number]    Group#: -- Precert#: --    Authorization#: NPN Obs Effective Date: --        [facility] Phone: [phone]    Subscriber: Ignacio Miranda Subscriber#: [id number]    Group#: [id number] Precert#: --    Authorization#: 2nd to Medicare NPN Obs Effective Date: --

## 2025-03-27 NOTE — THERAPY TREATMENT NOTE
"Subjective: Pt agreeable to therapeutic plan of care.     Objective:         Bed mobility - Modified-Independent  Transfers - CGA and with rolling walker  Ambulation - 15 feet CGA and with rolling walker      Vitals: WNL    Pain: 5 Diaz-Begum Location: L knee    Intervention for pain: Increased Activity    Education: Provided education on the importance of mobility in the acute care setting    Assessment: Ignacio Miranda is a 80 y/o M who presented with recurrent falls over the last 2 days and severe L knee pain in weight bearing. 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 No evidence of acute fracture. (-) for DVT. ultrasound noted popliteal fossa fluid collection, L knee. Ortho diagnosed with exacerbation of L knee pain and severe arthritis and stated he is WBAT. No acute surgical indication. Patient is planning for home, interested in SNF but is not able to go due to being in obs status. Looking into other living arrangement as pt has stairs to enter and no one that can help him and he is not able to safely complete independently. Based on his poor knee stability and poor mobility in general, he is not at a level to trial stairs in acute care setting. Patient was able to progress to ambulation 15' with RW and CGA to min A with multiple instances, increasing in frequency as distance progressed of L knee buckling/instability. Performed transfers with CGA to min A.  Will continue to follow and progress as tolerated.     Plan/Recommendations:   If medically appropriate, Moderate Intensity Therapy recommended post-acute care. This is recommended as therapy feels the patient would require 3-4 days per week and wouldn't tolerate \"3 hour daily\" rehab intensity. SNF would be the preferred choice. If the patient does not agree to SNF, arrange HH or OP depending on home bound status. If patient is medically complex, consider LTACH. Pt requires BSC at discharge.     Pt desires Home with Home " Health and Home with family assist at discharge. Pt cooperative; agreeable to therapeutic recommendations and plan of care.       Post-Tx Position: Supine with HOB Elevated, Alarms activated, and Call light and personal items within reach  PPE: gloves and surgical mask    Therapy Charges for Today       Code Description Service Date Service Provider Modifiers Qty    74627994341 HC PT EVAL MOD COMPLEXITY 4 3/26/2025 Adela Damon, PT GP 1    93459057021 HC GAIT TRAINING EA 15 MIN 3/27/2025 Adela Damon, PT GP 1    [id number]  PT THERAPEUTIC ACT EA 15 MIN 3/27/2025 Adela Damon, PT GP 1           PT Charges       Row Name 03/27/25 1026             Time Calculation    Start Time 0951  -      Stop Time 1015  -      Time Calculation (min) 24 min  -      PT Received On 03/27/25  -      PT - Next Appointment 03/28/25  -         Time Calculation- PT    Total Timed Code Minutes- PT 24 minute(s)  -                User Key  (r) = Recorded By, (t) = Taken By, (c) = Cosigned By      Initials Name Provider Type     Adela Damon, PT Physical Therapist                     Fall with Harm Risk

## 2025-03-27 NOTE — CASE MANAGEMENT/SOCIAL WORK
Case Management Discharge Note      Final Note: Home with [facility]. Wheelchair, BSC and sliding board delivered to his room              Durable Medical Equipment Coordination complete.      Service Provider Services Address Phone Fax Patient Preferred    [facility] [address] [address] [phone] --                      Home Medical Care Coordination complete.      Service Provider Services Address Phone Fax Patient Preferred    [facility] [address] [place], [place] [address] [phone] --                           Final Discharge Disposition Code: 06 - home with home health care

## 2025-03-27 NOTE — DISCHARGE PLACEMENT REQUEST
"Joon Covarrubias (79 y.o. Male)       Date of Birth   1945    Social Security Number       Address   [facility] [address] [place] [facility]    Home Phone   [phone]    MRN   [id number]       [facility]    Marital Status                               Admission Date   3/25/2025    Admission Type   Emergency    Admitting Provider   Hema Willis MD    Attending Provider   Hema Willis MD    Department, Room/Bed   [facility] OBSERVATION, 234/1       Discharge Date       Discharge Disposition       Discharge Destination                                 Attending Provider: Hema Willis MD    Allergies: No Known Allergies    Isolation: None   Infection: None   Code Status: CPR    Ht: 175.3 cm (69.02\")   Wt: 109 kg (240 lb 4.8 oz)    Admission Cmt: None   Principal Problem: Left leg pain [M79.605]                   Active Insurance as of 3/25/2025       Primary Coverage       Payor Plan Insurance Group Employer/Plan Group    MEDICARE RAILROAD MEDICARE        Payor Plan Address Payor Plan Phone Number Payor Plan Fax Number Effective Dates    [address] [phone]  1/1/2001 - None Entered    [place] [id number]         Subscriber Name Subscriber Birth Date Member ID       JOON COVARRUBIAS 1945 [id number]               Secondary Coverage       Payor Plan Insurance Group Employer/Plan Group    [facility] [id number]       Payor Plan Address Payor Plan Phone Number Payor Plan Fax Number Effective Dates    [address] [address] [phone]  1/1/2022 - None Entered    [facility] [address]         Subscriber Name Subscriber Birth Date Member ID       JOON COVARRUBIAS 1945 [id number]                     Emergency Contacts        (Rel.) Home Phone Work Phone Mobile Phone    RODRIGO PENA (Daughter) -- -- [phone]    AGUILAR COVARRUBIAS (Spouse) [phone] --    MAHAJANNANCY (Son) [phone] -- --      "         Insurance Information                  MEDICARE/RAILROAD MEDICARE Phone: [phone]    Subscriber: Ignacio Miranda Subscriber#: [id number]    Group#: -- Precert#: --    Authorization#: NPN Obs Effective Date: --        [facility] Phone: [phone]    Subscriber: Ignacio Miranda Subscriber#: [id number]    Group#: [id number] Precert#: --    Authorization#: 2nd to Medicare NPN Obs Effective Date: --

## 2025-03-27 NOTE — DISCHARGE SUMMARY
[facility]    Maddie Huang APRBILLIE    CHIEF COMPLAINT:     Left leg pain    HISTORY OF PRESENT ILLNESS:    HPI    ED  79-year-old gentleman who comes in from home with his wife at bedside with a history of type 2 diabetes and diabetic peripheral neuropathy who states that he has had increased pain in his left lower      Observation 3/26/25  Pt concurs with er hpi. Pt has had issues with left knee for quite some time. Pt has seen ortho and received injections for pain in knee. Ortho consulted. PT consulted         Past Medical History:   Diagnosis Date    Diabetes mellitus 2001    Type 2    Diabetic peripheral neuropathy 2/22/2022    Essential hypertension 2/22/2022    Iron deficiency anemia 7/24/2020    Mild depression 2/22/2022    Peripheral vascular disease 2/23/2022    Restless leg syndrome 2/22/2022    Type 2 diabetes mellitus with hyperglycemia 2/22/2022     History reviewed. No pertinent surgical history.  Family History   Problem Relation Age of Onset    No Known Problems Mother     No Known Problems Father      Social History     Tobacco Use    Smoking status: Never   Vaping Use    Vaping status: Never Used   Substance Use Topics    Alcohol use: Not Currently    Drug use: Never     Medications Prior to Admission   Medication Sig Dispense Refill Last Dose/Taking    Accu-Chek Softclix Lancets lancets 1 each by Other route 3 (Three) Times a Day Before Meals. Dx: E11.65. Use as instructed 100 each 2 3/25/2025    atorvastatin (LIPITOR) 20 MG tablet Take 1 tablet by mouth Daily.   3/25/2025    DULoxetine (CYMBALTA) 60 MG capsule Take 1 capsule by mouth Daily.   3/25/2025    gabapentin (NEURONTIN) 600 MG tablet Take 1 tablet by mouth 3 (Three) Times a Day.   3/25/2025    glucose blood (Accu-Chek Guide) test strip 1 each by Other route 3 (Three) Times a Day Before Meals. Dx: E11.65. Use as instructed 100 each 2 3/25/2025    Insulin Aspart, w/Niacinamide, (Fiasp) 100 UNIT/ML solution  Inject 30 Units under the skin into the appropriate area as directed Every Morning Before Breakfast.   Taking    Insulin Aspart, w/Niacinamide, (Fiasp) 100 UNIT/ML solution Inject 40 Units under the skin into the appropriate area as directed Daily Before Supper.   Taking    multivitamin with minerals tablet tablet Take 1 tablet by mouth Daily.   3/25/2025    Insulin Glargine, 2 Unit Dial, (Toujeo Max SoloStar) 300 UNIT/ML solution pen-injector injection Inject 20 Units under the skin into the appropriate area as directed every night at bedtime for 30 days. (Patient taking differently: Inject 80 Units under the skin into the appropriate area as directed every night at bedtime.)       insulin lispro (HUMALOG/ADMELOG) 100 UNIT/ML injection Inject 7 Units under the skin into the appropriate area as directed 3 (Three) Times a Day With Meals. Use sliding scale in addition to 7 units TID with meals as below, Blood Glucose 150-199 mg/dL - 2 units Blood Glucose 200-249 mg/dL - 4 units Blood Glucose 250-299 mg/dL - 6 units Blood Glucose 300-349 mg/dL - 7 units Blood Glucose 350-400 mg/dL - 8 units Blood Glucose greater than 400 mg/dL - 9 units        Allergies:  Patient has no known allergies.    Immunization History   Administered Date(s) Administered    COVID-19 (MODERNA) BIVALENT 12+YRS 10/13/2022    COVID-19 (PFIZER) Purple Cap Monovalent 01/27/2021, 01/12/2022    COVID-19 (UNSPECIFIED) 02/15/2021, 03/15/2021    Fluzone High-Dose 65+YRS 01/22/2018, 12/29/2018, 10/24/2019    Fluzone High-Dose 65+yrs 10/02/2020, 10/21/2021    Fluzone Quad >6mos (Multi-dose) 10/24/2019    Pneumococcal Conjugate 13-Valent (PCV13) 01/22/2018, 10/21/2021    Pneumococcal Polysaccharide (PPSV23) 10/15/2018, 12/29/2018           REVIEW OF SYSTEMS:    ROS    Constitutional: Negative for fever.   Respiratory:  Negative for shortness of breath.    Musculoskeletal:         Left knee and leg pain     Vital Signs  Temp:  [97.6 °F (36.4 °C)-97.9 °F (36.6  °C)] 97.9 °F (36.6 °C)  Heart Rate:  [] 90  Resp:  [14-17] 14  BP: (119-157)/(60-90) 157/90          Physical Exam:  Physical Exam    Constitutional:       Appearance: Normal appearance.   Musculoskeletal:      Comments: Decreased rom and tenderness to palp   Neurological:      General: No focal deficit present.      Mental Status: He is alert and oriented to person, place, and time. Mental status is at baseline.   Psychiatric:         Mood and Affect: Mood normal.         Behavior: Behavior normal.     Emotional Behavior:    wnl   Debilities:   None      Results Review:    I reviewed the patient's new clinical results.  Lab Results (most recent)       Procedure Component Value Units Date/Time    POC Glucose 4x Daily Before Meals & at Bedtime [[id number]]  (Abnormal) Collected: 03/27/25 1240    Specimen: Blood Updated: 03/27/25 1242     Glucose 258 mg/dL      Comment: Serial Number: [id number]:  [id number]       POC Glucose 4x Daily Before Meals & at Bedtime [[id number]]  (Abnormal) Collected: 03/27/25 0754    Specimen: Blood Updated: 03/27/25 0756     Glucose 133 mg/dL      Comment: Serial Number: [id number]:  [id number]       Basic Metabolic Panel [[id number]]  (Abnormal) Collected: 03/27/25 0532    Specimen: Blood Updated: 03/27/25 0611     Glucose 133 mg/dL      BUN 13 mg/dL      Creatinine 1.03 mg/dL      Sodium 139 mmol/L      Potassium 4.2 mmol/L      Chloride 100 mmol/L      CO2 27.6 mmol/L      Calcium 9.6 mg/dL      BUN/Creatinine Ratio 12.6     Anion Gap 11.4 mmol/L      eGFR 73.9 mL/min/1.73     Narrative:      GFR Categories in Chronic Kidney Disease (CKD)      GFR Category          GFR (mL/min/1.73)    Interpretation  G1                     90 or greater         Normal or high (1)  G2                      60-89                Mild decrease (1)  G3a                   45-59                Mild to moderate decrease  G3b                   30-44                Moderate to severe decrease  G4                     15-29                Severe decrease  G5                    14 or less           Kidney failure          (1)In the absence of evidence of kidney disease, neither GFR category G1 or G2 fulfill the criteria for CKD.    eGFR calculation 2021 CKD-EPI creatinine equation, which does not include race as a factor    CBC & Differential [[id number]]  (Abnormal) Collected: 03/27/25 0532    Specimen: Blood Updated: 03/27/25 0544    Narrative:      The following orders were created for panel order CBC & Differential.  Procedure                               Abnormality         Status                     ---------                               -----------         ------                     CBC Auto Differential[[id number]]        Abnormal            Final result                 Please view results for these tests on the individual orders.    CBC Auto Differential [[id number]]  (Abnormal) Collected: 03/27/25 0532    Specimen: Blood Updated: 03/27/25 0544     WBC 7.14 10*3/mm3      RBC 4.25 10*6/mm3      Hemoglobin 12.7 g/dL      Hematocrit 41.1 %      MCV 96.7 fL      MCH 29.9 pg      MCHC 30.9 g/dL      RDW 14.4 %      RDW-SD 51.2 fl      MPV 10.9 fL      Platelets 163 10*3/mm3      Neutrophil % 52.9 %      Lymphocyte % 31.9 %      Monocyte % 10.8 %      Eosinophil % 3.6 %      Basophil % 0.7 %      Immature Grans % 0.1 %      Neutrophils, Absolute 3.77 10*3/mm3      Lymphocytes, Absolute 2.28 10*3/mm3      Monocytes, Absolute 0.77 10*3/mm3      Eosinophils, Absolute 0.26 10*3/mm3      Basophils, Absolute 0.05 10*3/mm3      Immature Grans, Absolute 0.01 10*3/mm3      nRBC 0.0 /100 WBC     CBC Auto Differential [[id number]]  (Abnormal) Collected: 03/26/25 0539    Specimen: Blood Updated: 03/26/25 0547     WBC 8.25 10*3/mm3      RBC 4.37 10*6/mm3      Hemoglobin 13.1 g/dL      Hematocrit 42.6 %      MCV 97.5 fL      MCH 30.0 pg      MCHC 30.8 g/dL      RDW 14.4 %      RDW-SD 51.9 fl      MPV 10.9 fL      Platelets  146 10*3/mm3      Neutrophil % 60.9 %      Lymphocyte % 24.4 %      Monocyte % 10.3 %      Eosinophil % 3.5 %      Basophil % 0.5 %      Immature Grans % 0.4 %      Neutrophils, Absolute 5.03 10*3/mm3      Lymphocytes, Absolute 2.01 10*3/mm3      Monocytes, Absolute 0.85 10*3/mm3      Eosinophils, Absolute 0.29 10*3/mm3      Basophils, Absolute 0.04 10*3/mm3      Immature Grans, Absolute 0.03 10*3/mm3      nRBC 0.0 /100 WBC     Basic Metabolic Panel [[id number]]  (Abnormal) Collected: 03/26/25 0432    Specimen: Blood Updated: 03/26/25 0513     Glucose 149 mg/dL      BUN 11 mg/dL      Creatinine 0.97 mg/dL      Sodium 137 mmol/L      Potassium 4.5 mmol/L      Comment: Specimen hemolyzed.  Result may be falsely elevated.        Chloride 100 mmol/L      CO2 26.1 mmol/L      Calcium 9.2 mg/dL      BUN/Creatinine Ratio 11.3     Anion Gap 10.9 mmol/L      eGFR 79.4 mL/min/1.73     Narrative:      GFR Categories in Chronic Kidney Disease (CKD)      GFR Category          GFR (mL/min/1.73)    Interpretation  G1                     90 or greater         Normal or high (1)  G2                      60-89                Mild decrease (1)  G3a                   45-59                Mild to moderate decrease  G3b                   30-44                Moderate to severe decrease  G4                    15-29                Severe decrease  G5                    14 or less           Kidney failure          (1)In the absence of evidence of kidney disease, neither GFR category G1 or G2 fulfill the criteria for CKD.    eGFR calculation 2021 CKD-EPI creatinine equation, which does not include race as a factor    High Sensitivity Troponin T 1Hr [[id number]]  (Normal) Collected: 03/25/25 1914    Specimen: Blood Updated: 03/25/25 1935     HS Troponin T 12 ng/L      Troponin T Numeric Delta 1 ng/L     Narrative:      High Sensitive Troponin T Reference Range:  <14.0 ng/L- Negative Female for AMI  <22.0 ng/L- Negative Male for AMI  >=14 -  Abnormal Female indicating possible myocardial injury.  >=22 - Abnormal Male indicating possible myocardial injury.   Clinicians would have to utilize clinical acumen, EKG, Troponin, and serial changes to determine if it is an Acute Myocardial Infarction or myocardial injury due to an underlying chronic condition.         Comprehensive Metabolic Panel [[id number]]  (Abnormal) Collected: 03/25/25 1808    Specimen: Blood Updated: 03/25/25 1839     Glucose 200 mg/dL      BUN 13 mg/dL      Creatinine 1.11 mg/dL      Sodium 140 mmol/L      Potassium 3.8 mmol/L      Chloride 100 mmol/L      CO2 26.3 mmol/L      Calcium 9.6 mg/dL      Total Protein 7.6 g/dL      Albumin 4.0 g/dL      ALT (SGPT) 16 U/L      AST (SGOT) 25 U/L      Alkaline Phosphatase 113 U/L      Total Bilirubin 0.3 mg/dL      Globulin 3.6 gm/dL      A/G Ratio 1.1 g/dL      BUN/Creatinine Ratio 11.7     Anion Gap 13.7 mmol/L      eGFR 67.5 mL/min/1.73     Narrative:      GFR Categories in Chronic Kidney Disease (CKD)      GFR Category          GFR (mL/min/1.73)    Interpretation  G1                     90 or greater         Normal or high (1)  G2                      60-89                Mild decrease (1)  G3a                   45-59                Mild to moderate decrease  G3b                   30-44                Moderate to severe decrease  G4                    15-29                Severe decrease  G5                    14 or less           Kidney failure          (1)In the absence of evidence of kidney disease, neither GFR category G1 or G2 fulfill the criteria for CKD.    eGFR calculation 2021 CKD-EPI creatinine equation, which does not include race as a factor    High Sensitivity Troponin T [[id number]]  (Normal) Collected: 03/25/25 1808    Specimen: Blood Updated: 03/25/25 1839     HS Troponin T 11 ng/L     Narrative:      High Sensitive Troponin T Reference Range:  <14.0 ng/L- Negative Female for AMI  <22.0 ng/L- Negative Male for AMI  >=14 -  Abnormal Female indicating possible myocardial injury.  >=22 - Abnormal Male indicating possible myocardial injury.   Clinicians would have to utilize clinical acumen, EKG, Troponin, and serial changes to determine if it is an Acute Myocardial Infarction or myocardial injury due to an underlying chronic condition.         CBC & Differential [[id number]]  (Abnormal) Collected: 03/25/25 1808    Specimen: Blood Updated: 03/25/25 1815    Narrative:      The following orders were created for panel order CBC & Differential.  Procedure                               Abnormality         Status                     ---------                               -----------         ------                     CBC Auto Differential[[id number]]        Abnormal            Final result                 Please view results for these tests on the individual orders.    Extra Tubes [[id number]] Collected: 03/25/25 1808    Specimen: Blood, Venous Line Updated: 03/25/25 1815    Narrative:      The following orders were created for panel order Extra Tubes.  Procedure                               Abnormality         Status                     ---------                               -----------         ------                     Gold Top - SST[[id number]]                                   Final result               Light Blue Top[[id number]]                                   Final result                 Please view results for these tests on the individual orders.    Gold Top - SST [[id number]] Collected: 03/25/25 1808    Specimen: Blood Updated: 03/25/25 1815     Extra Tube Hold for add-ons.     Comment: Auto resulted.       Light Blue Top [[id number]] Collected: 03/25/25 1808    Specimen: Blood Updated: 03/25/25 1815     Extra Tube Hold for add-ons.     Comment: Auto resulted               Imaging Results (Most Recent)       Procedure Component Value Units Date/Time    XR Knee 3 View Left [[id number]] Collected: 03/26/25 1307     Updated: 03/26/25 3633     Narrative:      XR KNEE 3 VW LEFT    Date of Exam: 3/26/2025 11:45 AM EDT    Indication: left leg pain    Comparison: None available.    Findings:  No evidence of acute fracture. There is severe tricompartmental degenerative changes with joint space narrowing. Mild lateral subluxation of the tibia relative to the femur. No significant joint effusion. Atherosclerotic calcifications are present.      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 No evidence of acute fracture.      Electronically Signed: Robert Elam MD    3/26/2025 1:11 PM EDT    Workstation ID: [id number]          reviewed    ECG/EMG Results (most recent)       Procedure Component Value Units Date/Time    ECG 12 Lead Syncope [[id number]] Collected: 03/25/25 1834     Updated: 03/26/25 0639     QT Interval 342 ms      QTC Interval 442 ms     Narrative:      HEART FYLB=108  bpm  RR Pvjfrlfv=138  ms  RI Ksjacqym=147  ms  P Horizontal Axis=25  deg  P Front Axis=42  deg  QRSD Interval=89  ms  QT Sqqjcveh=287  ms  XIqF=121  ms  QRS Axis=-58  deg  T Wave Axis=23  deg  - ABNORMAL ECG -  Sinus tachycardia  Borderline  prolonged RI interval  Left anterior fascicular block  Anterior  infarct, old  When compared with ECG of 28-Aug-2024 17:11:55,  New or worsened ischemia or infarction  Electronically Signed By: Hema Willis ([facility]) 2025-03-26 06:39:12  Date and Time of Study:2025-03-25 18:34:32          reviewed    Results for orders placed during the hospital encounter of 03/25/25    Duplex Venous Lower Extremity - Left    Interpretation Summary    Normal left lower extremity venous duplex scan.    Left Baker's cyst.      Results for orders placed during the hospital encounter of 02/22/22    Adult Transthoracic Echo Complete W/ Cont if Necessary Per Protocol    Interpretation Summary  · Left ventricular wall thickness is consistent with mild concentric hypertrophy.  · Calculated left ventricular EF = 60% Estimated  left ventricular EF was in agreement with the calculated left ventricular EF.  · There is calcification of the aortic valve.    Transthoracic echocardiography is a technically difficult study.  EF is more than 60%.  Mild to moderate left atrial enlargement.  No effusion.      Electronically signed by Aba Frost MD, 02/24/22, 7:22 PM EST.      Microbiology Results (last 10 days)       ** No results found for the last 240 hours. **            Assessment & Plan     * No active hospital problems. *     Left knee pain  - cbc, cmp, troponin are unremarkable  - us LLE normal, bakers cyst  - ortho consulted  - left knee xray reviewed and showing severe tricompartmental degenerative changes of the left knee. No evidence of acute fracture.   - cont neurontin   - PT consulted and recommends snf or wc, bedside commode, home health pt, and sliding board  - pt declines snf so wc, sliding board and bedside commode along with HHPT  - Patient's mobility limitation impairs ability to participate in one or more of the following activities such as toileting, feeding, dressing, grooming and bathing. Patient's mobility limitation cannot be resolved by use of cane or walker. Patient is able to safely use a manual wheelchair. Patient's functional mobility deficit can be resolved by use of manual wheelchair. Patient can maneuver the chair independently or there is a caregiver to assist the patient with maneuvering. Patient has the strength to maneuver the wheelchair or patient has a caregiver that can propel the chair.   - The patient is physically incapable of utilizing regular toilet facilities. Use of bedside commode is necessary as they are confined to one room   - pt can follow up with orthopedics as outpt     Diabetes mellitus  -moderately controlled         Lab Results   Component Value Date     GLUCOSE 149 (H) 03/26/2025     GLUCOSE 200 (H) 03/25/2025     GLUCOSE 143 (H) 09/10/2024     GLUCOSE 228 (H) 09/02/2024       -lantus, ssi  -Diabetic diet  -Monitor before meals and at bedtime     I discussed the patients findings and my recommendations with patient and nursing staff.     Discharge Diagnosis:      * No active hospital problems. *      Hospital Course  Patient is a 79 y.o. male presented with left leg pain.  Er evaluated and admitted to observation. Labs including cbc,cmp, and troponin are normal. Us LLE normal, bakers cyst. Ortho consulted. Pt to follow up with ortho as outpt. PT consulted and recommended snf but pt unable to accommodate skilled care. PT recommended home health PT and pt will de discharged with pain meds, bedside commode, slide board and wheelchair. Discharge discussed with pt and he is agreeable to plan. Instructed pt to return to er if symptoms reoccur or worsen.      Past Medical History:     Past Medical History:   Diagnosis Date    Diabetes mellitus 2001    Type 2    Diabetic peripheral neuropathy 2/22/2022    Essential hypertension 2/22/2022    Iron deficiency anemia 7/24/2020    Mild depression 2/22/2022    Peripheral vascular disease 2/23/2022    Restless leg syndrome 2/22/2022    Type 2 diabetes mellitus with hyperglycemia 2/22/2022       Past Surgical History:   History reviewed. No pertinent surgical history.    Social History:   Social History     Socioeconomic History    Marital status:    Tobacco Use    Smoking status: Never   Vaping Use    Vaping status: Never Used   Substance and Sexual Activity    Alcohol use: Not Currently    Drug use: Never    Sexual activity: Defer       Procedures Performed         Consults:   Consults       Date and Time Order Name Status Description    3/25/2025  7:29 PM IP Consult to Orthopedic Surgery Completed             Condition on Discharge:     Stable    Discharge Disposition      Discharge Medications     Discharge Medications        Changes to Medications        Instructions Start Date   Lis Dias SoloStar 300 UNIT/ML solution pen-injector  injection  Generic drug: Insulin Glargine (2 Unit Dial)  What changed: how much to take   20 Units, Subcutaneous, Every Night at Bedtime             Continue These Medications        Instructions Start Date   Accu-Chek Guide test strip  Generic drug: glucose blood   1 each, Other, 3 Times Daily Before Meals, Dx: E11.65. Use as instructed      Accu-Chek Softclix Lancets lancets   1 each, Other, 3 Times Daily Before Meals, Dx: E11.65. Use as instructed      atorvastatin 20 MG tablet  Commonly known as: LIPITOR   20 mg, Daily      DULoxetine 60 MG capsule  Commonly known as: CYMBALTA   60 mg, Daily      gabapentin 600 MG tablet  Commonly known as: NEURONTIN   600 mg, 3 Times Daily      insulin lispro 100 UNIT/ML injection  Commonly known as: HUMALOG/ADMELOG   7 Units, Subcutaneous, 3 Times Daily With Meals, Use sliding scale in addition to 7 units TID with meals as below, Blood Glucose 150-199 mg/dL - 2 units Blood Glucose 200-249 mg/dL - 4 units Blood Glucose 250-299 mg/dL - 6 units Blood Glucose 300-349 mg/dL - 7 units Blood Glucose 350-400 mg/dL - 8 units Blood Glucose greater than 400 mg/dL - 9 units      multivitamin with minerals tablet tablet   1 tablet, Daily             ASK your doctor about these medications        Instructions Start Date   Fiasp 100 UNIT/ML solution  Generic drug: Insulin Aspart (w/Niacinamide)   30 Units, Every Morning Before Breakfast      Fiasp 100 UNIT/ML solution  Generic drug: Insulin Aspart (w/Niacinamide)   40 Units, Daily Before Supper               Discharge Diet:     Activity at Discharge:     Follow-up Appointments  No future appointments.      Test Results Pending at Discharge  Pending Results       None             Risk for Readmission (LACE) Score: 6 (3/27/2025  6:00 AM)      Less Than 30 minutes spent in discharge activities for this patient    Signature:Electronically signed by Beba Vincent PA-C, 03/27/25, 12:48 PM EDT.

## 2025-03-27 NOTE — PLAN OF CARE
Goal Outcome Evaluation:  Plan of Care Reviewed With: patient           Ignacio Miranda is a 78 y/o M who presented with recurrent falls over the last 2 days and severe L knee pain in weight bearing. 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 No evidence of acute fracture. (-) for DVT. ultrasound noted popliteal fossa fluid collection, L knee. Ortho diagnosed with exacerbation of L knee pain and severe arthritis and stated he is WBAT. No acute surgical indication. Patient is planning for home, interested in SNF but is not able to go due to being in obs status. Looking into other living arrangement as pt has stairs to enter and no one that can help him and he is not able to safely complete independently. Based on his poor knee stability and poor mobility in general, he is not at a level to trial stairs in acute care setting. Patient was able to progress to ambulation 15' with RW and CGA to min A with multiple instances, increasing in frequency as distance progressed of L knee buckling/instability. Performed transfers with CGA to min A.  Will continue to follow and progress as tolerated.     Anticipated Discharge Disposition (PT): skilled nursing facility

## 2025-03-27 NOTE — PLAN OF CARE
Problem: Adult Inpatient Plan of Care  Goal: Absence of Hospital-Acquired Illness or Injury  Intervention: Identify and Manage Fall Risk  Recent Flowsheet Documentation  Taken 3/27/2025 0000 by Lexis Gonzalez RN  Safety Promotion/Fall Prevention: safety round/check completed  Taken 3/26/2025 2200 by Lexis Gonzalez RN  Safety Promotion/Fall Prevention: safety round/check completed  Taken 3/26/2025 1952 by Lexis Gonzalez RN  Safety Promotion/Fall Prevention: safety round/check completed  Taken 3/26/2025 1831 by Lexis Gonzalez RN  Safety Promotion/Fall Prevention: safety round/check completed  Taken 3/26/2025 1600 by Lexis Gonzalez RN  Safety Promotion/Fall Prevention: safety round/check completed  Taken 3/26/2025 1400 by Lexis Gonzalez RN  Safety Promotion/Fall Prevention: safety round/check completed  Intervention: Prevent Skin Injury  Recent Flowsheet Documentation  Taken 3/26/2025 1952 by Lexis Gonzalez RN  Body Position: position changed independently  Skin Protection: transparent dressing maintained  Intervention: Prevent Infection  Recent Flowsheet Documentation  Taken 3/26/2025 1952 by Lexis Gonzalez RN  Infection Prevention: rest/sleep promoted  Goal: Optimal Comfort and Wellbeing  Intervention: Provide Person-Centered Care  Recent Flowsheet Documentation  Taken 3/26/2025 1952 by Lexis Gonzalez RN  Trust Relationship/Rapport:   care explained   choices provided   emotional support provided   empathic listening provided   questions answered   questions encouraged   reassurance provided   thoughts/feelings acknowledged     Problem: Skin Injury Risk Increased  Goal: Skin Health and Integrity  Intervention: Optimize Skin Protection  Recent Flowsheet Documentation  Taken 3/26/2025 1952 by Lexis Gonzalez RN  Activity Management: up in chair  Pressure Reduction Techniques: frequent weight shift encouraged  Head of Bed (HOB) Positioning: HOB elevated  Pressure Reduction Devices:  pressure-redistributing mattress utilized  Skin Protection: transparent dressing maintained  Goal: Skin Health and Integrity  Intervention: Optimize Skin Protection  Recent Flowsheet Documentation  Taken 3/26/2025 1952 by Lexis Gonzalez RN  Activity Management: up in chair  Pressure Reduction Techniques: frequent weight shift encouraged  Head of Bed (HOB) Positioning: HOB elevated  Pressure Reduction Devices: pressure-redistributing mattress utilized  Skin Protection: transparent dressing maintained     Problem: Fall Injury Risk  Goal: Absence of Fall and Fall-Related Injury  Intervention: Identify and Manage Contributors  Recent Flowsheet Documentation  Taken 3/26/2025 1952 by Lexis Gonzalez RN  Medication Review/Management: medications reviewed  Intervention: Promote Injury-Free Environment  Recent Flowsheet Documentation  Taken 3/27/2025 0000 by Lexis Gonzalez RN  Safety Promotion/Fall Prevention: safety round/check completed  Taken 3/26/2025 2200 by Lexis Gnozalez RN  Safety Promotion/Fall Prevention: safety round/check completed  Taken 3/26/2025 1952 by Lexis Gonzalez RN  Safety Promotion/Fall Prevention: safety round/check completed  Taken 3/26/2025 1831 by Lexis Gonzalez RN  Safety Promotion/Fall Prevention: safety round/check completed  Taken 3/26/2025 1600 by Lexis Gonzalez RN  Safety Promotion/Fall Prevention: safety round/check completed  Taken 3/26/2025 1400 by Lexis Gonzalez RN  Safety Promotion/Fall Prevention: safety round/check completed   Goal Outcome Evaluation:

## 2025-05-08 ENCOUNTER — APPOINTMENT (OUTPATIENT)
Dept: GENERAL RADIOLOGY | Facility: HOSPITAL | Age: 80
End: 2025-05-08
Payer: MEDICARE

## 2025-05-08 ENCOUNTER — APPOINTMENT (OUTPATIENT)
Dept: CT IMAGING | Facility: HOSPITAL | Age: 80
End: 2025-05-08
Payer: MEDICARE

## 2025-05-08 ENCOUNTER — HOSPITAL ENCOUNTER (OUTPATIENT)
Facility: HOSPITAL | Age: 80
Setting detail: OBSERVATION
Discharge: NURSING, REHAB OR LONG-TERM CARE | End: 2025-05-09
Attending: EMERGENCY MEDICINE | Admitting: EMERGENCY MEDICINE
Payer: MEDICARE

## 2025-05-08 DIAGNOSIS — R53.1 GENERAL WEAKNESS: Primary | ICD-10-CM

## 2025-05-08 DIAGNOSIS — S80.02XA CONTUSION OF LEFT KNEE, INITIAL ENCOUNTER: ICD-10-CM

## 2025-05-08 DIAGNOSIS — E86.0 DEHYDRATION: ICD-10-CM

## 2025-05-08 LAB
ALBUMIN SERPL-MCNC: 4.3 G/DL (ref 3.5–5.2)
ALBUMIN/GLOB SERPL: 1.1 G/DL
ALP SERPL-CCNC: 120 U/L (ref 39–117)
ALT SERPL W P-5'-P-CCNC: 20 U/L (ref 1–41)
ANION GAP SERPL CALCULATED.3IONS-SCNC: 10 MMOL/L (ref 5–15)
AST SERPL-CCNC: 24 U/L (ref 1–40)
BASOPHILS # BLD AUTO: 0.06 10*3/MM3 (ref 0–0.2)
BASOPHILS NFR BLD AUTO: 0.5 % (ref 0–1.5)
BILIRUB SERPL-MCNC: 0.7 MG/DL (ref 0–1.2)
BILIRUB UR QL STRIP: NEGATIVE
BUN SERPL-MCNC: 19 MG/DL (ref 8–23)
BUN/CREAT SERPL: 18.4 (ref 7–25)
CALCIUM SPEC-SCNC: 9.9 MG/DL (ref 8.6–10.5)
CHLORIDE SERPL-SCNC: 98 MMOL/L (ref 98–107)
CLARITY UR: CLEAR
CO2 SERPL-SCNC: 31 MMOL/L (ref 22–29)
COLOR UR: YELLOW
CREAT SERPL-MCNC: 1.03 MG/DL (ref 0.76–1.27)
DEPRECATED RDW RBC AUTO: 52.2 FL (ref 37–54)
EGFRCR SERPLBLD CKD-EPI 2021: 73.9 ML/MIN/1.73
EOSINOPHIL # BLD AUTO: 0.22 10*3/MM3 (ref 0–0.4)
EOSINOPHIL NFR BLD AUTO: 1.8 % (ref 0.3–6.2)
ERYTHROCYTE [DISTWIDTH] IN BLOOD BY AUTOMATED COUNT: 14.6 % (ref 12.3–15.4)
GLOBULIN UR ELPH-MCNC: 3.8 GM/DL
GLUCOSE BLDC GLUCOMTR-MCNC: 166 MG/DL (ref 70–105)
GLUCOSE SERPL-MCNC: 234 MG/DL (ref 65–99)
GLUCOSE UR STRIP-MCNC: ABNORMAL MG/DL
HCT VFR BLD AUTO: 48.1 % (ref 37.5–51)
HGB BLD-MCNC: 14.8 G/DL (ref 13–17.7)
HGB UR QL STRIP.AUTO: NEGATIVE
HOLD SPECIMEN: NORMAL
HOLD SPECIMEN: NORMAL
IMM GRANULOCYTES # BLD AUTO: 0.03 10*3/MM3 (ref 0–0.05)
IMM GRANULOCYTES NFR BLD AUTO: 0.2 % (ref 0–0.5)
KETONES UR QL STRIP: ABNORMAL
LEUKOCYTE ESTERASE UR QL STRIP.AUTO: NEGATIVE
LYMPHOCYTES # BLD AUTO: 1.62 10*3/MM3 (ref 0.7–3.1)
LYMPHOCYTES NFR BLD AUTO: 13.1 % (ref 19.6–45.3)
MAGNESIUM SERPL-MCNC: 2 MG/DL (ref 1.6–2.4)
MCH RBC QN AUTO: 29.7 PG (ref 26.6–33)
MCHC RBC AUTO-ENTMCNC: 30.8 G/DL (ref 31.5–35.7)
MCV RBC AUTO: 96.4 FL (ref 79–97)
MONOCYTES # BLD AUTO: 0.81 10*3/MM3 (ref 0.1–0.9)
MONOCYTES NFR BLD AUTO: 6.6 % (ref 5–12)
NEUTROPHILS NFR BLD AUTO: 77.8 % (ref 42.7–76)
NEUTROPHILS NFR BLD AUTO: 9.6 10*3/MM3 (ref 1.7–7)
NITRITE UR QL STRIP: NEGATIVE
NRBC BLD AUTO-RTO: 0 /100 WBC (ref 0–0.2)
PH UR STRIP.AUTO: <=5 [PH] (ref 5–8)
PLATELET # BLD AUTO: 181 10*3/MM3 (ref 140–450)
PMV BLD AUTO: 11.1 FL (ref 6–12)
POTASSIUM SERPL-SCNC: 4.9 MMOL/L (ref 3.5–5.2)
PROT SERPL-MCNC: 8.1 G/DL (ref 6–8.5)
PROT UR QL STRIP: NEGATIVE
QT INTERVAL: 388 MS
QTC INTERVAL: 575 MS
RBC # BLD AUTO: 4.99 10*6/MM3 (ref 4.14–5.8)
SODIUM SERPL-SCNC: 139 MMOL/L (ref 136–145)
SP GR UR STRIP: 1.02 (ref 1–1.03)
TSH SERPL DL<=0.05 MIU/L-ACNC: 2.53 UIU/ML (ref 0.27–4.2)
UROBILINOGEN UR QL STRIP: ABNORMAL
WBC NRBC COR # BLD AUTO: 12.34 10*3/MM3 (ref 3.4–10.8)
WHOLE BLOOD HOLD COAG: NORMAL
WHOLE BLOOD HOLD SPECIMEN: NORMAL

## 2025-05-08 PROCEDURE — 73562 X-RAY EXAM OF KNEE 3: CPT

## 2025-05-08 PROCEDURE — 25810000003 LACTATED RINGERS PER 1000 ML: Performed by: EMERGENCY MEDICINE

## 2025-05-08 PROCEDURE — 93005 ELECTROCARDIOGRAM TRACING: CPT | Performed by: EMERGENCY MEDICINE

## 2025-05-08 PROCEDURE — 99285 EMERGENCY DEPT VISIT HI MDM: CPT

## 2025-05-08 PROCEDURE — 96361 HYDRATE IV INFUSION ADD-ON: CPT

## 2025-05-08 PROCEDURE — G0378 HOSPITAL OBSERVATION PER HR: HCPCS

## 2025-05-08 PROCEDURE — 85025 COMPLETE CBC W/AUTO DIFF WBC: CPT | Performed by: EMERGENCY MEDICINE

## 2025-05-08 PROCEDURE — 96360 HYDRATION IV INFUSION INIT: CPT

## 2025-05-08 PROCEDURE — 63710000001 INSULIN GLARGINE PER 5 UNITS: Performed by: NURSE PRACTITIONER

## 2025-05-08 PROCEDURE — 83735 ASSAY OF MAGNESIUM: CPT | Performed by: EMERGENCY MEDICINE

## 2025-05-08 PROCEDURE — 81003 URINALYSIS AUTO W/O SCOPE: CPT | Performed by: EMERGENCY MEDICINE

## 2025-05-08 PROCEDURE — 25810000003 SODIUM CHLORIDE 0.9 % SOLUTION: Performed by: EMERGENCY MEDICINE

## 2025-05-08 PROCEDURE — 82948 REAGENT STRIP/BLOOD GLUCOSE: CPT

## 2025-05-08 PROCEDURE — 80053 COMPREHEN METABOLIC PANEL: CPT | Performed by: EMERGENCY MEDICINE

## 2025-05-08 PROCEDURE — 84443 ASSAY THYROID STIM HORMONE: CPT | Performed by: EMERGENCY MEDICINE

## 2025-05-08 PROCEDURE — 70450 CT HEAD/BRAIN W/O DYE: CPT

## 2025-05-08 RX ORDER — ONDANSETRON 2 MG/ML
4 INJECTION INTRAMUSCULAR; INTRAVENOUS EVERY 6 HOURS PRN
Status: DISCONTINUED | OUTPATIENT
Start: 2025-05-08 | End: 2025-05-09 | Stop reason: HOSPADM

## 2025-05-08 RX ORDER — SODIUM CHLORIDE 0.9 % (FLUSH) 0.9 %
10 SYRINGE (ML) INJECTION EVERY 12 HOURS SCHEDULED
Status: DISCONTINUED | OUTPATIENT
Start: 2025-05-08 | End: 2025-05-09 | Stop reason: HOSPADM

## 2025-05-08 RX ORDER — SODIUM CHLORIDE 0.9 % (FLUSH) 0.9 %
10 SYRINGE (ML) INJECTION AS NEEDED
Status: DISCONTINUED | OUTPATIENT
Start: 2025-05-08 | End: 2025-05-09 | Stop reason: HOSPADM

## 2025-05-08 RX ORDER — GABAPENTIN 300 MG/1
600 CAPSULE ORAL ONCE
Status: COMPLETED | OUTPATIENT
Start: 2025-05-08 | End: 2025-05-08

## 2025-05-08 RX ORDER — SODIUM CHLORIDE, SODIUM LACTATE, POTASSIUM CHLORIDE, CALCIUM CHLORIDE 600; 310; 30; 20 MG/100ML; MG/100ML; MG/100ML; MG/100ML
100 INJECTION, SOLUTION INTRAVENOUS CONTINUOUS
Status: DISPENSED | OUTPATIENT
Start: 2025-05-08 | End: 2025-05-09

## 2025-05-08 RX ORDER — SODIUM CHLORIDE 9 MG/ML
40 INJECTION, SOLUTION INTRAVENOUS AS NEEDED
Status: DISCONTINUED | OUTPATIENT
Start: 2025-05-08 | End: 2025-05-09 | Stop reason: HOSPADM

## 2025-05-08 RX ADMIN — GABAPENTIN 600 MG: 300 CAPSULE ORAL at 22:52

## 2025-05-08 RX ADMIN — SODIUM CHLORIDE 500 ML: 9 INJECTION, SOLUTION INTRAVENOUS at 19:47

## 2025-05-08 RX ADMIN — Medication 10 ML: at 22:22

## 2025-05-08 RX ADMIN — SODIUM CHLORIDE, POTASSIUM CHLORIDE, SODIUM LACTATE AND CALCIUM CHLORIDE 100 ML/HR: 600; 310; 30; 20 INJECTION, SOLUTION INTRAVENOUS at 22:22

## 2025-05-08 RX ADMIN — INSULIN GLARGINE 30 UNITS: 100 INJECTION, SOLUTION SUBCUTANEOUS at 22:52

## 2025-05-08 NOTE — ED PROVIDER NOTES
Subjective   History of Present Illness  79-year-old male states he fell today when he was transitioning from his walker to his chair.  States he had to be assisted back up by the fire department.  Wife states he does have chronic limited mobility.  He reports no head injury.  He states he did feel lightheaded after the fall briefly.  He reports no chest or abdominal pain or headache or focal numbness or weakness.  Review of Systems    Past Medical History:   Diagnosis Date    Diabetes mellitus 2001    Type 2    Diabetic peripheral neuropathy 2/22/2022    Essential hypertension 2/22/2022    Iron deficiency anemia 7/24/2020    Mild depression 2/22/2022    Peripheral vascular disease 2/23/2022    Restless leg syndrome 2/22/2022    Type 2 diabetes mellitus with hyperglycemia 2/22/2022       No Known Allergies    No past surgical history on file.    Family History   Problem Relation Age of Onset    No Known Problems Mother     No Known Problems Father        Social History     Socioeconomic History    Marital status:    Tobacco Use    Smoking status: Never   Vaping Use    Vaping status: Never Used   Substance and Sexual Activity    Alcohol use: Not Currently    Drug use: Never    Sexual activity: Defer     Prior to Admission medications    Medication Sig Start Date End Date Taking? Authorizing Provider   Accu-Chek Softclix Lancets lancets 1 each by Other route 3 (Three) Times a Day Before Meals. Dx: E11.65. Use as instructed 9/1/24   Halie Tobias MD   atorvastatin (LIPITOR) 20 MG tablet Take 1 tablet by mouth Daily. 5/18/22   Gurwinder Dumont MD   DULoxetine (CYMBALTA) 60 MG capsule Take 1 capsule by mouth Daily.    Gurwinder Dumont MD   gabapentin (NEURONTIN) 600 MG tablet Take 1 tablet by mouth 3 (Three) Times a Day.    Gurwinder Dumont MD   glucose blood (Accu-Chek Guide) test strip 1 each by Other route 3 (Three) Times a Day Before Meals. Dx: E11.65. Use as instructed 9/1/24    "Halie Tobias MD   Insulin Aspart, w/Niacinamide, (Fiasp) 100 UNIT/ML solution Inject 30 Units under the skin into the appropriate area as directed Every Morning Before Breakfast.    ProviderGurwinder MD   Insulin Aspart, w/Niacinamide, (Fiasp) 100 UNIT/ML solution Inject 40 Units under the skin into the appropriate area as directed Daily Before Supper.    ProviderGurwinder MD   Insulin Glargine, 2 Unit Dial, (Toujeo Max SoloStar) 300 UNIT/ML solution pen-injector injection Inject 20 Units under the skin into the appropriate area as directed every night at bedtime for 30 days.  Patient taking differently: Inject 80 Units under the skin into the appropriate area as directed every night at bedtime. 9/1/24 10/1/24  Halie Tobias MD   insulin lispro (HUMALOG/ADMELOG) 100 UNIT/ML injection Inject 7 Units under the skin into the appropriate area as directed 3 (Three) Times a Day With Meals. Use sliding scale in addition to 7 units TID with meals as below, Blood Glucose 150-199 mg/dL - 2 units Blood Glucose 200-249 mg/dL - 4 units Blood Glucose 250-299 mg/dL - 6 units Blood Glucose 300-349 mg/dL - 7 units Blood Glucose 350-400 mg/dL - 8 units Blood Glucose greater than 400 mg/dL - 9 units 9/1/24   Halie Tobias MD   multivitamin with minerals tablet tablet Take 1 tablet by mouth Daily.    Provider, MD Gurwinder   traMADol (ULTRAM) 50 MG tablet Take 1 tablet by mouth Every 6 (Six) Hours As Needed for Moderate Pain. 3/27/25   Beba Vincent PA-C     BP 99/56 (Patient Position: Standing)   Pulse 109   Temp 97.9 °F (36.6 °C)   Resp 18   Ht 175.3 cm (69\")   Wt 90.7 kg (200 lb)   SpO2 96%   BMI 29.53 kg/m²         Objective   Physical Exam  General: Obese elderly male awake and alert, no acute distress  Eyes: Pupils round and equal, sclera nonicteric  HEENT: Mucous membranes somewhat dry, no mucosal swelling, normocephalic, atraumatic  Neck: C-spine nontender, no " JVD  Respirations: Respirations nonlabored, equal breath sounds bilaterally, clear lungs  Heart regular rate and rhythm, no murmurs rubs or gallops,   Abdomen soft nontender nondistended,   Extremities no clubbing cyanosis or edema, calves are symmetric and nontender, no point bony tenderness  Neuro cranial nerves grossly intact, no focal limb deficits, generally deconditioned, no pronator drift, speech clear  Psych oriented, pleasant affect  Skin no rash, brisk cap refill  Procedures           ED Course      Results for orders placed or performed during the hospital encounter of 05/08/25   ECG 12 Lead Other; dizziness    Collection Time: 05/08/25  6:10 PM   Result Value Ref Range    QT Interval 388 ms    QTC Interval 575 ms   ECG 12 Lead Syncope    Collection Time: 05/08/25  6:22 PM   Result Value Ref Range    QT Interval 380 ms    QTC Interval 461 ms   Comprehensive Metabolic Panel    Collection Time: 05/08/25  6:39 PM    Specimen: Blood   Result Value Ref Range    Glucose 234 (H) 65 - 99 mg/dL    BUN 19 8 - 23 mg/dL    Creatinine 1.03 0.76 - 1.27 mg/dL    Sodium 139 136 - 145 mmol/L    Potassium 4.9 3.5 - 5.2 mmol/L    Chloride 98 98 - 107 mmol/L    CO2 31.0 (H) 22.0 - 29.0 mmol/L    Calcium 9.9 8.6 - 10.5 mg/dL    Total Protein 8.1 6.0 - 8.5 g/dL    Albumin 4.3 3.5 - 5.2 g/dL    ALT (SGPT) 20 1 - 41 U/L    AST (SGOT) 24 1 - 40 U/L    Alkaline Phosphatase 120 (H) 39 - 117 U/L    Total Bilirubin 0.7 0.0 - 1.2 mg/dL    Globulin 3.8 gm/dL    A/G Ratio 1.1 g/dL    BUN/Creatinine Ratio 18.4 7.0 - 25.0    Anion Gap 10.0 5.0 - 15.0 mmol/L    eGFR 73.9 >60.0 mL/min/1.73   Magnesium    Collection Time: 05/08/25  6:39 PM    Specimen: Blood   Result Value Ref Range    Magnesium 2.0 1.6 - 2.4 mg/dL   TSH Rfx On Abnormal To Free T4    Collection Time: 05/08/25  6:39 PM    Specimen: Blood   Result Value Ref Range    TSH 2.530 0.270 - 4.200 uIU/mL   Green Top (Gel)    Collection Time: 05/08/25  6:39 PM   Result Value Ref Range     Extra Tube Hold for add-ons.    Gold Top - SST    Collection Time: 05/08/25  6:39 PM   Result Value Ref Range    Extra Tube Hold for add-ons.    Light Blue Top    Collection Time: 05/08/25  6:39 PM   Result Value Ref Range    Extra Tube Hold for add-ons.    CBC Auto Differential    Collection Time: 05/08/25  6:42 PM    Specimen: Blood   Result Value Ref Range    WBC 12.34 (H) 3.40 - 10.80 10*3/mm3    RBC 4.99 4.14 - 5.80 10*6/mm3    Hemoglobin 14.8 13.0 - 17.7 g/dL    Hematocrit 48.1 37.5 - 51.0 %    MCV 96.4 79.0 - 97.0 fL    MCH 29.7 26.6 - 33.0 pg    MCHC 30.8 (L) 31.5 - 35.7 g/dL    RDW 14.6 12.3 - 15.4 %    RDW-SD 52.2 37.0 - 54.0 fl    MPV 11.1 6.0 - 12.0 fL    Platelets 181 140 - 450 10*3/mm3    Neutrophil % 77.8 (H) 42.7 - 76.0 %    Lymphocyte % 13.1 (L) 19.6 - 45.3 %    Monocyte % 6.6 5.0 - 12.0 %    Eosinophil % 1.8 0.3 - 6.2 %    Basophil % 0.5 0.0 - 1.5 %    Immature Grans % 0.2 0.0 - 0.5 %    Neutrophils, Absolute 9.60 (H) 1.70 - 7.00 10*3/mm3    Lymphocytes, Absolute 1.62 0.70 - 3.10 10*3/mm3    Monocytes, Absolute 0.81 0.10 - 0.90 10*3/mm3    Eosinophils, Absolute 0.22 0.00 - 0.40 10*3/mm3    Basophils, Absolute 0.06 0.00 - 0.20 10*3/mm3    Immature Grans, Absolute 0.03 0.00 - 0.05 10*3/mm3    nRBC 0.0 0.0 - 0.2 /100 WBC   Lavender Top    Collection Time: 05/08/25  6:42 PM   Result Value Ref Range    Extra Tube hold for add-on    Urinalysis With Culture If Indicated - Urine, Clean Catch    Collection Time: 05/08/25  8:22 PM    Specimen: Urine, Clean Catch   Result Value Ref Range    Color, UA Yellow Yellow, Straw    Appearance, UA Clear Clear    pH, UA <=5.0 5.0 - 8.0    Specific Gravity, UA 1.019 1.005 - 1.030    Glucose,  mg/dL (Trace) (A) Negative    Ketones, UA Trace (A) Negative    Bilirubin, UA Negative Negative    Blood, UA Negative Negative    Protein, UA Negative Negative    Leuk Esterase, UA Negative Negative    Nitrite, UA Negative Negative    Urobilinogen, UA 1.0 E.U./dL 0.2 - 1.0  E.U./dL     XR Knee 3 View Left  Result Date: 5/8/2025  Impression: 1.No radiographic evidence of acute fracture or dislocation. 2.Advanced tricompartmental osteoarthrosis. Electronically Signed: Rony Van MD  5/8/2025 7:31 PM EDT  Workstation ID: [id number]    CT Head Without Contrast  Result Date: 5/8/2025  Impression: No acute intracranial abnormality on head CT. Brain MRI is more sensitive to evaluate for acute or subacute infarct. Electronically Signed: Sandy Alcantar MD  5/8/2025 6:53 PM EDT  Workstation ID: [id number]                                                     Medical Decision Making  Orthostatic measurements show some significant drop in blood pressure and he was weak upon standing.  He has no focal deficits to suggest CVA.  He is afebrile, he has no infectious source, sepsis felt to be unlikely.  He does appear somewhat dehydrated.  He was ordered IV fluids.  He was having difficulty standing due to general weakness.  Wife and patient advised of findings and agreeable plan of observation with plan for further hydration and monitoring and plan for PT evaluation in the morning for functional status.    Problems Addressed:  Contusion of left knee, initial encounter: complicated acute illness or injury  Dehydration: complicated acute illness or injury  General weakness: complicated acute illness or injury    Amount and/or Complexity of Data Reviewed  Labs: ordered. Decision-making details documented in ED Course.     Details: CBC shows some mild leukocytosis is nonspecific, magnesium normal, TSH normal, comprehensive metabolic panel shows some hyperglycemia without significant acidosis  Radiology: ordered and independent interpretation performed.     Details: My independent interpretation of x-ray image of the left knee no apparent acute bony injury, chronic arthritis, CT head no apparent acute intracranial abnormality  ECG/medicine tests: ordered and independent interpretation performed.     Details: My  EKG interpretation sinus rhythm, right bundle branch block, rate of 88    Risk  Prescription drug management.  Decision regarding hospitalization.        Final diagnoses:   General weakness   Dehydration   Contusion of left knee, initial encounter       ED Disposition  ED Disposition       ED Disposition   Decision to Admit    Condition   --    Comment   --               No follow-up provider specified.       Medication List      No changes were made to your prescriptions during this visit.            Claudy Willis MD  05/08/25 2600

## 2025-05-09 VITALS
BODY MASS INDEX: 29.62 KG/M2 | RESPIRATION RATE: 16 BRPM | WEIGHT: 199.96 LBS | HEIGHT: 69 IN | SYSTOLIC BLOOD PRESSURE: 118 MMHG | DIASTOLIC BLOOD PRESSURE: 67 MMHG | TEMPERATURE: 97.6 F | HEART RATE: 95 BPM | OXYGEN SATURATION: 95 %

## 2025-05-09 LAB
ANION GAP SERPL CALCULATED.3IONS-SCNC: 12.4 MMOL/L (ref 5–15)
BASOPHILS # BLD AUTO: 0.05 10*3/MM3 (ref 0–0.2)
BASOPHILS NFR BLD AUTO: 0.5 % (ref 0–1.5)
BUN SERPL-MCNC: 19 MG/DL (ref 8–23)
BUN/CREAT SERPL: 19.8 (ref 7–25)
CALCIUM SPEC-SCNC: 9.2 MG/DL (ref 8.6–10.5)
CHLORIDE SERPL-SCNC: 102 MMOL/L (ref 98–107)
CO2 SERPL-SCNC: 27.6 MMOL/L (ref 22–29)
CREAT SERPL-MCNC: 0.96 MG/DL (ref 0.76–1.27)
DEPRECATED RDW RBC AUTO: 51.3 FL (ref 37–54)
EGFRCR SERPLBLD CKD-EPI 2021: 80.4 ML/MIN/1.73
EOSINOPHIL # BLD AUTO: 0.23 10*3/MM3 (ref 0–0.4)
EOSINOPHIL NFR BLD AUTO: 2.1 % (ref 0.3–6.2)
ERYTHROCYTE [DISTWIDTH] IN BLOOD BY AUTOMATED COUNT: 14.7 % (ref 12.3–15.4)
GLUCOSE BLDC GLUCOMTR-MCNC: 138 MG/DL (ref 70–105)
GLUCOSE BLDC GLUCOMTR-MCNC: 263 MG/DL (ref 70–105)
GLUCOSE BLDC GLUCOMTR-MCNC: 272 MG/DL (ref 70–105)
GLUCOSE SERPL-MCNC: 181 MG/DL (ref 65–99)
HBA1C MFR BLD: 10.1 % (ref 4.8–5.6)
HCT VFR BLD AUTO: 44.7 % (ref 37.5–51)
HGB BLD-MCNC: 13.6 G/DL (ref 13–17.7)
IMM GRANULOCYTES # BLD AUTO: 0.04 10*3/MM3 (ref 0–0.05)
IMM GRANULOCYTES NFR BLD AUTO: 0.4 % (ref 0–0.5)
LYMPHOCYTES # BLD AUTO: 2.54 10*3/MM3 (ref 0.7–3.1)
LYMPHOCYTES NFR BLD AUTO: 23.2 % (ref 19.6–45.3)
MCH RBC QN AUTO: 29.2 PG (ref 26.6–33)
MCHC RBC AUTO-ENTMCNC: 30.4 G/DL (ref 31.5–35.7)
MCV RBC AUTO: 96.1 FL (ref 79–97)
MONOCYTES # BLD AUTO: 0.93 10*3/MM3 (ref 0.1–0.9)
MONOCYTES NFR BLD AUTO: 8.5 % (ref 5–12)
NEUTROPHILS NFR BLD AUTO: 65.3 % (ref 42.7–76)
NEUTROPHILS NFR BLD AUTO: 7.17 10*3/MM3 (ref 1.7–7)
NRBC BLD AUTO-RTO: 0 /100 WBC (ref 0–0.2)
PLATELET # BLD AUTO: 195 10*3/MM3 (ref 140–450)
PMV BLD AUTO: 11.1 FL (ref 6–12)
POTASSIUM SERPL-SCNC: 4.5 MMOL/L (ref 3.5–5.2)
QT INTERVAL: 380 MS
QTC INTERVAL: 461 MS
RBC # BLD AUTO: 4.65 10*6/MM3 (ref 4.14–5.8)
SODIUM SERPL-SCNC: 142 MMOL/L (ref 136–145)
WBC NRBC COR # BLD AUTO: 10.96 10*3/MM3 (ref 3.4–10.8)

## 2025-05-09 PROCEDURE — 83036 HEMOGLOBIN GLYCOSYLATED A1C: CPT | Performed by: NURSE PRACTITIONER

## 2025-05-09 PROCEDURE — 82948 REAGENT STRIP/BLOOD GLUCOSE: CPT

## 2025-05-09 PROCEDURE — 82948 REAGENT STRIP/BLOOD GLUCOSE: CPT | Performed by: NURSE PRACTITIONER

## 2025-05-09 PROCEDURE — 85025 COMPLETE CBC W/AUTO DIFF WBC: CPT | Performed by: EMERGENCY MEDICINE

## 2025-05-09 PROCEDURE — G0378 HOSPITAL OBSERVATION PER HR: HCPCS

## 2025-05-09 PROCEDURE — 80048 BASIC METABOLIC PNL TOTAL CA: CPT | Performed by: EMERGENCY MEDICINE

## 2025-05-09 PROCEDURE — 63710000001 INSULIN LISPRO (HUMAN) PER 5 UNITS: Performed by: NURSE PRACTITIONER

## 2025-05-09 PROCEDURE — 97162 PT EVAL MOD COMPLEX 30 MIN: CPT

## 2025-05-09 PROCEDURE — 96361 HYDRATE IV INFUSION ADD-ON: CPT

## 2025-05-09 RX ORDER — IBUPROFEN 600 MG/1
1 TABLET ORAL
Status: DISCONTINUED | OUTPATIENT
Start: 2025-05-09 | End: 2025-05-09 | Stop reason: HOSPADM

## 2025-05-09 RX ORDER — NICOTINE POLACRILEX 4 MG
15 LOZENGE BUCCAL
Status: DISCONTINUED | OUTPATIENT
Start: 2025-05-09 | End: 2025-05-09 | Stop reason: HOSPADM

## 2025-05-09 RX ORDER — GABAPENTIN 300 MG/1
600 CAPSULE ORAL EVERY 12 HOURS SCHEDULED
Status: DISCONTINUED | OUTPATIENT
Start: 2025-05-09 | End: 2025-05-09 | Stop reason: HOSPADM

## 2025-05-09 RX ORDER — ATORVASTATIN CALCIUM 20 MG/1
20 TABLET, FILM COATED ORAL DAILY
Status: DISCONTINUED | OUTPATIENT
Start: 2025-05-09 | End: 2025-05-09 | Stop reason: HOSPADM

## 2025-05-09 RX ORDER — ACETAMINOPHEN 325 MG/1
650 TABLET ORAL EVERY 6 HOURS PRN
Status: DISCONTINUED | OUTPATIENT
Start: 2025-05-09 | End: 2025-05-09 | Stop reason: HOSPADM

## 2025-05-09 RX ORDER — TRAMADOL HYDROCHLORIDE 50 MG/1
50 TABLET ORAL EVERY 6 HOURS PRN
Status: DISCONTINUED | OUTPATIENT
Start: 2025-05-09 | End: 2025-05-09 | Stop reason: HOSPADM

## 2025-05-09 RX ORDER — INSULIN LISPRO 100 [IU]/ML
3-14 INJECTION, SOLUTION INTRAVENOUS; SUBCUTANEOUS
Status: DISCONTINUED | OUTPATIENT
Start: 2025-05-09 | End: 2025-05-09 | Stop reason: HOSPADM

## 2025-05-09 RX ORDER — DEXTROSE MONOHYDRATE 25 G/50ML
25 INJECTION, SOLUTION INTRAVENOUS
Status: DISCONTINUED | OUTPATIENT
Start: 2025-05-09 | End: 2025-05-09 | Stop reason: HOSPADM

## 2025-05-09 RX ORDER — DULOXETIN HYDROCHLORIDE 30 MG/1
60 CAPSULE, DELAYED RELEASE ORAL DAILY
Status: DISCONTINUED | OUTPATIENT
Start: 2025-05-09 | End: 2025-05-09 | Stop reason: HOSPADM

## 2025-05-09 RX ADMIN — INSULIN LISPRO 8 UNITS: 100 INJECTION, SOLUTION INTRAVENOUS; SUBCUTANEOUS at 18:06

## 2025-05-09 RX ADMIN — ATORVASTATIN CALCIUM 20 MG: 20 TABLET, FILM COATED ORAL at 08:53

## 2025-05-09 RX ADMIN — INSULIN LISPRO 8 UNITS: 100 INJECTION, SOLUTION INTRAVENOUS; SUBCUTANEOUS at 11:15

## 2025-05-09 RX ADMIN — GABAPENTIN 600 MG: 300 CAPSULE ORAL at 08:53

## 2025-05-09 RX ADMIN — DULOXETINE 60 MG: 30 CAPSULE, DELAYED RELEASE ORAL at 08:53

## 2025-05-09 NOTE — H&P
Critical access hospital Observation Unit H&P    Patient Name: Ignacio Miranda  : 1945  MRN: [id number]  Primary Care Physician: Maddie Huang APRN  Date of admission: 2025     Patient Care Team:  Maddie Huang APRN as PCP - General (Family Medicine)          Subjective   History Present Illness     Chief Complaint:   Chief Complaint   Patient presents with    Dizziness     Dizziness     History of Present Illness  Ed 2025  79-year-old male states he fell today when he was transitioning from his walker to his chair. States he had to be assisted back up by the fire department. Wife states he does have chronic limited mobility. He reports no head injury. He states he did feel lightheaded after the fall briefly. He reports no chest or abdominal pain or headache or focal numbness or weakness.     Observation 2025  Patient agrees with HPI noted above and states that he fell yesterday as he felt like his knees gave out  ROS***        Personal History     Past Medical History:   Past Medical History:   Diagnosis Date    Diabetes mellitus 2001    Type 2    Diabetic peripheral neuropathy 2022    Essential hypertension 2022    Iron deficiency anemia 2020    Mild depression 2022    Peripheral vascular disease 2022    Restless leg syndrome 2022    Type 2 diabetes mellitus with hyperglycemia 2022       Surgical History:    History reviewed. No pertinent surgical history.        Family History: family history includes No Known Problems in his father and mother. Otherwise pertinent FHx was reviewed and unremarkable.     Social History:  reports that he has never smoked. He does not have any smokeless tobacco history on file. He reports that he does not currently use alcohol. He reports that he does not use drugs.      Medications:  Prior to Admission medications    Medication Sig Start Date End Date Taking? Authorizing Provider   Accu-Chek Softclix Lancets lancets 1 each by  Other route 3 (Three) Times a Day Before Meals. Dx: E11.65. Use as instructed 9/1/24   Halie Tobias MD   atorvastatin (LIPITOR) 20 MG tablet Take 1 tablet by mouth Daily. 5/18/22   Gurwinder Dumont MD   DULoxetine (CYMBALTA) 60 MG capsule Take 1 capsule by mouth Daily.    Gurwinder Dumont MD   gabapentin (NEURONTIN) 600 MG tablet Take 1 tablet by mouth 3 (Three) Times a Day.    Gurwinder Dumont MD   glucose blood (Accu-Chek Guide) test strip 1 each by Other route 3 (Three) Times a Day Before Meals. Dx: E11.65. Use as instructed 9/1/24   Halie Tobias MD   Insulin Aspart, w/Niacinamide, (Fiasp) 100 UNIT/ML solution Inject 30 Units under the skin into the appropriate area as directed Every Morning Before Breakfast.    Gurwinder Dumont MD   Insulin Aspart, w/Niacinamide, (Fiasp) 100 UNIT/ML solution Inject 40 Units under the skin into the appropriate area as directed Daily Before Supper.    Gurwinder Dumont MD   Insulin Glargine, 2 Unit Dial, (Toujeo Max SoloStar) 300 UNIT/ML solution pen-injector injection Inject 20 Units under the skin into the appropriate area as directed every night at bedtime for 30 days.  Patient taking differently: Inject 80 Units under the skin into the appropriate area as directed every night at bedtime. 9/1/24 10/1/24  Halie Tobias MD   insulin lispro (HUMALOG/ADMELOG) 100 UNIT/ML injection Inject 7 Units under the skin into the appropriate area as directed 3 (Three) Times a Day With Meals. Use sliding scale in addition to 7 units TID with meals as below, Blood Glucose 150-199 mg/dL - 2 units Blood Glucose 200-249 mg/dL - 4 units Blood Glucose 250-299 mg/dL - 6 units Blood Glucose 300-349 mg/dL - 7 units Blood Glucose 350-400 mg/dL - 8 units Blood Glucose greater than 400 mg/dL - 9 units 9/1/24   Halie Tobias MD   multivitamin with minerals tablet tablet Take 1 tablet by mouth Daily.    Gurwinder Dumont MD   traMADol  (ULTRAM) 50 MG tablet Take 1 tablet by mouth Every 6 (Six) Hours As Needed for Moderate Pain. 3/27/25   Beba Vincent PA-C       Allergies:  No Known Allergies    Objective   Objective     Vital Signs  Temp:  [97.6 °F (36.4 °C)-98 °F (36.7 °C)] 97.7 °F (36.5 °C)  Heart Rate:  [] 95  Resp:  [13-19] 13  BP: ()/(56-88) 115/74  SpO2:  [94 %-99 %] 95 %  on   ;   Device (Oxygen Therapy): room air  Body mass index is 29.51 kg/m².    Physical Exam  ***    Results Review:  I have personally reviewed most recent {Reviewdata:79800405} and agree with findings, most notably: ***.    Results from last 7 days   Lab Units 05/09/25  0438   WBC 10*3/mm3 10.96*   HEMOGLOBIN g/dL 13.6   HEMATOCRIT % 44.7   PLATELETS 10*3/mm3 195     Results from last 7 days   Lab Units 05/09/25  0438 05/08/25  1839   SODIUM mmol/L 142 139   POTASSIUM mmol/L 4.5 4.9   CHLORIDE mmol/L 102 98   CO2 mmol/L 27.6 31.0*   BUN mg/dL 19 19   CREATININE mg/dL 0.96 1.03   GLUCOSE mg/dL 181* 234*   CALCIUM mg/dL 9.2 9.9   ALK PHOS U/L  --  120*   ALT (SGPT) U/L  --  20   AST (SGOT) U/L  --  24     Estimated Creatinine Clearance: 69.5 mL/min (by C-G formula based on SCr of 0.96 mg/dL).  Brief Urine Lab Results  (Last result in the past 365 days)        Color   Clarity   Blood   Leuk Est   Nitrite   Protein   CREAT   Urine HCG        05/08/25 2022 Yellow   Clear   Negative   Negative   Negative   Negative                   Microbiology Results (last 10 days)       ** No results found for the last 240 hours. **            ECG/EMG Results (most recent)       Procedure Component Value Units Date/Time    ECG 12 Lead Other; dizziness [[id number]] Collected: 05/08/25 1810     Updated: 05/08/25 1813     QT Interval 388 ms      QTC Interval 575 ms     Narrative:      HEART PYAB=492  bpm  RR Mmhawued=115  ms  ME Interval=  ms  P Horizontal Axis=  deg  P Front Axis=  deg  QRSD Ibjbuzel=565  ms  QT Jdokcbtz=319  ms  DPfR=444  ms  QRS Axis=-71  deg  T Wave  Axis=-16  deg  - ABNORMAL ECG -  Atrial fibrillation  Ventricular tachycardia, unsustained  Right bundle branch block  ST elevation secondary to IVCD  Date and Time of Study:2025-05-08 18:10:59    Telemetry Scan [[id number]] Resulted: 05/08/25     Updated: 05/09/25 0528    ECG 12 Lead Syncope [[id number]] Collected: 05/08/25 1822     Updated: 05/09/25 0717     QT Interval 380 ms      QTC Interval 461 ms     Narrative:      HEART RATE=88  bpm  RR Bcilrucu=722  ms  FL Jfuaofxy=084  ms  P Horizontal Axis=1  deg  P Front Axis=50  deg  QRSD Ydvqvmna=623  ms  QT Suwawvrh=152  ms  ZCyY=877  ms  QRS Axis=-109  deg  T Wave Axis=-10  deg  - ABNORMAL ECG -  Sinus rhythm  Right bundle branch block  When compared with ECG of 25-Mar-2025 18:34:32,  New conduction abnormality  Electronically Signed By: Claudy Willis (Mathew) 2025-05-09 07:17:04  Date and Time of Study:2025-05-08 18:22:09    Telemetry Scan [[id number]] Resulted: 05/08/25     Updated: 05/09/25 0824            Results for orders placed during the hospital encounter of 03/25/25    Duplex Venous Lower Extremity - Left    Interpretation Summary    Normal left lower extremity venous duplex scan.    Left Baker's cyst.      Results for orders placed during the hospital encounter of 02/22/22    Adult Transthoracic Echo Complete W/ Cont if Necessary Per Protocol    Interpretation Summary  · Left ventricular wall thickness is consistent with mild concentric hypertrophy.  · Calculated left ventricular EF = 60% Estimated left ventricular EF was in agreement with the calculated left ventricular EF.  · There is calcification of the aortic valve.    Transthoracic echocardiography is a technically difficult study.  EF is more than 60%.  Mild to moderate left atrial enlargement.  No effusion.      Electronically signed by Aba Frost MD, 02/24/22, 7:22 PM EST.      XR Knee 3 View Left  Result Date: 5/8/2025  Impression: 1.No radiographic evidence of acute fracture or  dislocation. 2.Advanced tricompartmental osteoarthrosis. Electronically Signed: Rony Van MD  5/8/2025 7:31 PM EDT  Workstation ID: [id number]    CT Head Without Contrast  Result Date: 5/8/2025  Impression: No acute intracranial abnormality on head CT. Brain MRI is more sensitive to evaluate for acute or subacute infarct. Electronically Signed: Sandy Alcantar MD  5/8/2025 6:53 PM EDT  Workstation ID: [id number]        Estimated Creatinine Clearance: 69.5 mL/min (by C-G formula based on SCr of 0.96 mg/dL).    Assessment & Plan   Assessment/Plan       Active Hospital Problems    Diagnosis  POA    **Dehydration [E86.0]  Yes      Resolved Hospital Problems   No resolved problems to display.     Dehydration   - CMP and CBC generally unremarkable  - UA showed trace of ketones but otherwise unremarkable  - EKG showed sinus rhythm with heart rate 88  - Positive for orthostatic hypotension in the ED  - IV gentle hydration    Recurrent falls  - Reports 3 falls within the past week             VTE Prophylaxis - Active VTE Prophylaxis  Mechanical:        Start        05/08/25 2157  Maintain Sequential Compression Device  Continuous                          Select Pharmacologic VTE Prophylaxis if Desired & Appropriate      CODE STATUS:    Code Status and Medical Interventions: CPR (Attempt to Resuscitate); Full Support   Ordered at: 05/08/25 2102     Code Status (Patient has no pulse and is not breathing):    CPR (Attempt to Resuscitate)     Medical Interventions (Patient has pulse or is breathing):    Full Support       This patient has been examined wearing personal protective equipment.     I discussed the patient's findings and my recommendations with patient and nursing staff.      Signature:Electronically signed by COLLIN Chávez, 05/09/25, 1:08 PM EDT.

## 2025-05-09 NOTE — SIGNIFICANT NOTE
05/09/25 1420   Rehab Time/Intention   Session Not Performed   (Pt discharging to [facility] today)   Recommendation   OT - Next Appointment 05/12/25

## 2025-05-09 NOTE — DISCHARGE SUMMARY
[facility]    Reina Maddie COLLIN Valdes    CHIEF COMPLAINT:     Fall    HISTORY OF PRESENT ILLNESS:    Dizziness  Symptoms include weakness.    Pertinent negative symptoms include no nausea and no vomiting.     ED 05/08/2025  79-year-old male states he fell today when he was transitioning from his walker to his chair. States he had to be assisted back up by the fire department. Wife states he does have chronic limited mobility. He reports no head injury. He states he did feel lightheaded after the fall briefly. He reports no chest or abdominal pain or headache or focal numbness or weakness.     Observation 05/09/2025  Patient agrees with HPI noted above and clarifies that fall is acute on chronic and he reports recurrent falls with 3 falls just in the past week.  He has chronic knee problems and feels like his leg gives out.  He did fill slightly lightheaded yesterday when he got up from the commode and try to walk toward his walker but he states he is asymptomatic now and feels better.    Past Medical History:   Diagnosis Date    Diabetes mellitus 2001    Type 2    Diabetic peripheral neuropathy 2/22/2022    Essential hypertension 2/22/2022    Iron deficiency anemia 7/24/2020    Mild depression 2/22/2022    Peripheral vascular disease 2/23/2022    Restless leg syndrome 2/22/2022    Type 2 diabetes mellitus with hyperglycemia 2/22/2022     History reviewed. No pertinent surgical history.  Family History   Problem Relation Age of Onset    No Known Problems Mother     No Known Problems Father      Social History     Tobacco Use    Smoking status: Never   Vaping Use    Vaping status: Never Used   Substance Use Topics    Alcohol use: Not Currently    Drug use: Never     Medications Prior to Admission   Medication Sig Dispense Refill Last Dose/Taking    Accu-Chek Softclix Lancets lancets 1 each by Other route 3 (Three) Times a Day Before Meals. Dx: E11.65. Use as instructed 100 each 2      atorvastatin (LIPITOR) 20 MG tablet Take 1 tablet by mouth Daily.       DULoxetine (CYMBALTA) 60 MG capsule Take 1 capsule by mouth Daily.       gabapentin (NEURONTIN) 600 MG tablet Take 1 tablet by mouth 3 (Three) Times a Day.       glucose blood (Accu-Chek Guide) test strip 1 each by Other route 3 (Three) Times a Day Before Meals. Dx: E11.65. Use as instructed 100 each 2     Insulin Aspart, w/Niacinamide, (Fiasp) 100 UNIT/ML solution Inject 30 Units under the skin into the appropriate area as directed Every Morning Before Breakfast.       Insulin Aspart, w/Niacinamide, (Fiasp) 100 UNIT/ML solution Inject 40 Units under the skin into the appropriate area as directed Daily Before Supper.       Insulin Glargine, 2 Unit Dial, (Toujeo Max SoloStar) 300 UNIT/ML solution pen-injector injection Inject 20 Units under the skin into the appropriate area as directed every night at bedtime for 30 days. (Patient taking differently: Inject 80 Units under the skin into the appropriate area as directed every night at bedtime.)       insulin lispro (HUMALOG/ADMELOG) 100 UNIT/ML injection Inject 7 Units under the skin into the appropriate area as directed 3 (Three) Times a Day With Meals. Use sliding scale in addition to 7 units TID with meals as below, Blood Glucose 150-199 mg/dL - 2 units Blood Glucose 200-249 mg/dL - 4 units Blood Glucose 250-299 mg/dL - 6 units Blood Glucose 300-349 mg/dL - 7 units Blood Glucose 350-400 mg/dL - 8 units Blood Glucose greater than 400 mg/dL - 9 units       multivitamin with minerals tablet tablet Take 1 tablet by mouth Daily.       traMADol (ULTRAM) 50 MG tablet Take 1 tablet by mouth Every 6 (Six) Hours As Needed for Moderate Pain. 12 tablet 0      Allergies:  Patient has no known allergies.    Immunization History   Administered Date(s) Administered    COVID-19 (MODERNA) BIVALENT 12+YRS 10/13/2022    COVID-19 (PFIZER) Purple Cap Monovalent 01/27/2021, 01/12/2022    COVID-19 (UNSPECIFIED)  02/15/2021, 03/15/2021    Fluzone High-Dose 65+YRS 01/22/2018, 12/29/2018, 10/24/2019    Fluzone High-Dose 65+yrs 10/02/2020, 10/21/2021    Fluzone Quad >6mos (Multi-dose) 10/24/2019    Pneumococcal Conjugate 13-Valent (PCV13) 01/22/2018, 10/21/2021    Pneumococcal Polysaccharide (PPSV23) 10/15/2018, 12/29/2018           REVIEW OF SYSTEMS:    Review of Systems   Constitutional: Negative for malaise/fatigue.   Musculoskeletal:  Positive for falls.   Gastrointestinal:  Negative for nausea and vomiting.   Neurological:  Positive for dizziness and weakness.   All other systems reviewed and are negative.        Vital Signs  Temp:  [97.6 °F (36.4 °C)-98 °F (36.7 °C)] 97.7 °F (36.5 °C)  Heart Rate:  [] 95  Resp:  [13-19] 13  BP: ()/(56-88) 115/74          Physical Exam:  Physical Exam  Vitals and nursing note reviewed.   Constitutional:       Appearance: Normal appearance.   HENT:      Head: Normocephalic and atraumatic.      Right Ear: External ear normal.      Left Ear: External ear normal.      Nose: Nose normal.      Mouth/Throat:      Mouth: Mucous membranes are moist.   Eyes:      Extraocular Movements: Extraocular movements intact.   Cardiovascular:      Rate and Rhythm: Normal rate and regular rhythm.      Pulses: Normal pulses.      Heart sounds: Normal heart sounds.   Pulmonary:      Effort: Pulmonary effort is normal.      Breath sounds: Normal breath sounds.   Abdominal:      General: Abdomen is flat. Bowel sounds are normal.      Palpations: Abdomen is soft.   Musculoskeletal:         General: Normal range of motion.      Cervical back: Normal range of motion.   Skin:     General: Skin is warm.   Neurological:      Mental Status: He is alert and oriented to person, place, and time.   Psychiatric:         Behavior: Behavior normal.         Thought Content: Thought content normal.         Judgment: Judgment normal.           Emotional Behavior:   Normal   Debilities:  None  Results Review:    I  reviewed the patient's new clinical results.  Lab Results (most recent)       Procedure Component Value Units Date/Time    POC Glucose Once [[id number]]  (Abnormal) Collected: 05/09/25 1108    Specimen: Blood Updated: 05/09/25 1110     Glucose 272 mg/dL      Comment: Serial Number: [id number]:  [id number]       Hemoglobin A1c [[id number]]  (Abnormal) Collected: 05/09/25 0438    Specimen: Blood Updated: 05/09/25 0843     Hemoglobin A1C 10.10 %     Narrative:      Hemoglobin A1C Ranges:    Increased Risk for Diabetes  5.7% to 6.4%  Diabetes                     >= 6.5%  Diabetic Goal                < 7.0%    POC Glucose Finger 4x Daily Before Meals & at Bedtime [[id number]]  (Abnormal) Collected: 05/09/25 0728    Specimen: Blood from Finger Updated: 05/09/25 0730     Glucose 138 mg/dL      Comment: Serial Number: [id number]:  [id number]       Basic Metabolic Panel [[id number]]  (Abnormal) Collected: 05/09/25 0438    Specimen: Blood Updated: 05/09/25 0628     Glucose 181 mg/dL      BUN 19 mg/dL      Creatinine 0.96 mg/dL      Sodium 142 mmol/L      Potassium 4.5 mmol/L      Comment: Specimen hemolyzed.  Result may be falsely elevated.        Chloride 102 mmol/L      CO2 27.6 mmol/L      Calcium 9.2 mg/dL      BUN/Creatinine Ratio 19.8     Anion Gap 12.4 mmol/L      eGFR 80.4 mL/min/1.73     Narrative:      GFR Categories in Chronic Kidney Disease (CKD)              GFR Category          GFR (mL/min/1.73)    Interpretation  G1                    90 or greater        Normal or high (1)  G2                    60-89                Mild decrease (1)  G3a                   45-59                Mild to moderate decrease  G3b                   30-44                Moderate to severe decrease  G4                    15-29                Severe decrease  G5                    14 or less           Kidney failure    (1)In the absence of evidence of kidney disease, neither GFR category G1 or G2 fulfill the criteria for  CKD.    eGFR calculation 2021 CKD-EPI creatinine equation, which does not include race as a factor    CBC Auto Differential [[id number]]  (Abnormal) Collected: 05/09/25 0438    Specimen: Blood Updated: 05/09/25 0559     WBC 10.96 10*3/mm3      RBC 4.65 10*6/mm3      Hemoglobin 13.6 g/dL      Hematocrit 44.7 %      MCV 96.1 fL      MCH 29.2 pg      MCHC 30.4 g/dL      RDW 14.7 %      RDW-SD 51.3 fl      MPV 11.1 fL      Platelets 195 10*3/mm3      Neutrophil % 65.3 %      Lymphocyte % 23.2 %      Monocyte % 8.5 %      Eosinophil % 2.1 %      Basophil % 0.5 %      Immature Grans % 0.4 %      Neutrophils, Absolute 7.17 10*3/mm3      Lymphocytes, Absolute 2.54 10*3/mm3      Monocytes, Absolute 0.93 10*3/mm3      Eosinophils, Absolute 0.23 10*3/mm3      Basophils, Absolute 0.05 10*3/mm3      Immature Grans, Absolute 0.04 10*3/mm3      nRBC 0.0 /100 WBC     Urinalysis With Culture If Indicated - Urine, Clean Catch [[id number]]  (Abnormal) Collected: 05/08/25 2022    Specimen: Urine, Clean Catch Updated: 05/08/25 2030     Color, UA Yellow     Appearance, UA Clear     pH, UA <=5.0     Specific Gravity, UA 1.019     Glucose,  mg/dL (Trace)     Ketones, UA Trace     Bilirubin, UA Negative     Blood, UA Negative     Protein, UA Negative     Leuk Esterase, UA Negative     Nitrite, UA Negative     Urobilinogen, UA 1.0 E.U./dL    Narrative:      In absence of clinical symptoms, the presence of pyuria, bacteria, and/or nitrites on the urinalysis result does not correlate with infection.  Urine microscopic not indicated.    Comprehensive Metabolic Panel [[id number]]  (Abnormal) Collected: 05/08/25 1839    Specimen: Blood Updated: 05/08/25 1951     Glucose 234 mg/dL      BUN 19 mg/dL      Creatinine 1.03 mg/dL      Sodium 139 mmol/L      Potassium 4.9 mmol/L      Chloride 98 mmol/L      CO2 31.0 mmol/L      Calcium 9.9 mg/dL      Total Protein 8.1 g/dL      Albumin 4.3 g/dL      ALT (SGPT) 20 U/L      AST (SGOT) 24 U/L       Alkaline Phosphatase 120 U/L      Total Bilirubin 0.7 mg/dL      Globulin 3.8 gm/dL      A/G Ratio 1.1 g/dL      BUN/Creatinine Ratio 18.4     Anion Gap 10.0 mmol/L      eGFR 73.9 mL/min/1.73     Narrative:      GFR Categories in Chronic Kidney Disease (CKD)              GFR Category          GFR (mL/min/1.73)    Interpretation  G1                    90 or greater        Normal or high (1)  G2                    60-89                Mild decrease (1)  G3a                   45-59                Mild to moderate decrease  G3b                   30-44                Moderate to severe decrease  G4                    15-29                Severe decrease  G5                    14 or less           Kidney failure    (1)In the absence of evidence of kidney disease, neither GFR category G1 or G2 fulfill the criteria for CKD.    eGFR calculation 2021 CKD-EPI creatinine equation, which does not include race as a factor    Magnesium [[id number]]  (Normal) Collected: 05/08/25 1839    Specimen: Blood Updated: 05/08/25 1951     Magnesium 2.0 mg/dL     TSH Rfx On Abnormal To Free T4 [[id number]]  (Normal) Collected: 05/08/25 1839    Specimen: Blood Updated: 05/08/25 1951     TSH 2.530 uIU/mL     CBC & Differential [[id number]]  (Abnormal) Collected: 05/08/25 1842    Specimen: Blood Updated: 05/08/25 1942    Narrative:      The following orders were created for panel order CBC & Differential.  Procedure                               Abnormality         Status                     ---------                               -----------         ------                     CBC Auto Differential[[id number]]        Abnormal            Final result                 Please view results for these tests on the individual orders.    CBC Auto Differential [[id number]]  (Abnormal) Collected: 05/08/25 1842    Specimen: Blood Updated: 05/08/25 1942     WBC 12.34 10*3/mm3      RBC 4.99 10*6/mm3      Hemoglobin 14.8 g/dL      Hematocrit 48.1 %      MCV 96.4 fL       MCH 29.7 pg      MCHC 30.8 g/dL      RDW 14.6 %      RDW-SD 52.2 fl      MPV 11.1 fL      Platelets 181 10*3/mm3      Neutrophil % 77.8 %      Lymphocyte % 13.1 %      Monocyte % 6.6 %      Eosinophil % 1.8 %      Basophil % 0.5 %      Immature Grans % 0.2 %      Neutrophils, Absolute 9.60 10*3/mm3      Lymphocytes, Absolute 1.62 10*3/mm3      Monocytes, Absolute 0.81 10*3/mm3      Eosinophils, Absolute 0.22 10*3/mm3      Basophils, Absolute 0.06 10*3/mm3      Immature Grans, Absolute 0.03 10*3/mm3      nRBC 0.0 /100 WBC     [place] Draw [[id number]] Collected: 05/08/25 1839    Specimen: Blood Updated: 05/08/25 1845    Narrative:      The following orders were created for panel order [place] Draw.  Procedure                               Abnormality         Status                     ---------                               -----------         ------                     Green Top (Gel)[[id number]]                                  Final result               Lavender Top[[id number]]                                     Final result               Gold Top - SST[[id number]]                                   Final result               Light Blue Top[[id number]]                                   Final result                 Please view results for these tests on the individual orders.    Green Top (Gel) [[id number]] Collected: 05/08/25 1839    Specimen: Blood Updated: 05/08/25 1845     Extra Tube Hold for add-ons.     Comment: Auto resulted.       Lavender Top [[id number]] Collected: 05/08/25 1842    Specimen: Blood Updated: 05/08/25 1845     Extra Tube hold for add-on     Comment: Auto resulted       Gold Top - SST [[id number]] Collected: 05/08/25 1839    Specimen: Blood Updated: 05/08/25 1845     Extra Tube Hold for add-ons.     Comment: Auto resulted.       Light Blue Top [[id number]] Collected: 05/08/25 1839    Specimen: Blood Updated: 05/08/25 1845     Extra Tube Hold for add-ons.     Comment: Auto resulted                Imaging Results (Most Recent)       Procedure Component Value Units Date/Time    XR Knee 3 View Left [[id number]] Collected: 05/08/25 1930     Updated: 05/08/25 1933    Narrative:      XR KNEE 3 VW LEFT    Date of Exam: 5/8/2025 7:25 PM EDT    Indication: trauma    Comparison: Radiographs of the left knee performed on March 26, 2025    Findings:  There is no radiographic evidence of acute fracture or dislocation. Advanced tricompartmental joint space narrowing of the left knee is visualized. Osteophytes are visualized throughout the left knee. No focal soft tissue abnormalities identified.   Arterial calcifications are visualized.      Impression:      Impression:  1.No radiographic evidence of acute fracture or dislocation.  2.Advanced tricompartmental osteoarthrosis.        Electronically Signed: Rony Van MD    5/8/2025 7:31 PM EDT    Workstation ID: [id number]    CT Head Without Contrast [[id number]] Collected: 05/08/25 1852     Updated: 05/08/25 1855    Narrative:      CT HEAD WO CONTRAST    Date of Exam: 5/8/2025 6:37 PM EDT    Indication: weakness.    Comparison: CT head without contrast 8/20/2024    Technique: Axial CT images were obtained of the head without contrast administration.  Coronal reconstructions were performed.  Automated exposure control and iterative reconstruction methods were used.      Findings:  No intracranial hemorrhage. No mass or midline shift. Diffuse volume loss. Symmetric enlargement sulci and ventricles. No focal hypodensities to suggest acute or subacute infarct. The gray-white matter differentiation is intact. Mild amount of   low-density in the periventricular and subcortical white matter. Globes and extraocular muscles are normal in appearance. The mastoid air cells are well aerated. There is mucosal thickening in the left sphenoid sinus.      Impression:      Impression:  No acute intracranial abnormality on head CT. Brain MRI is more sensitive to evaluate for acute or  subacute infarct.      Electronically Signed: Sandy Alcantar MD    5/8/2025 6:53 PM EDT    Workstation ID: [id number]          reviewed    ECG/EMG Results (most recent)       Procedure Component Value Units Date/Time    ECG 12 Lead Other; dizziness [[id number]] Collected: 05/08/25 1810     Updated: 05/08/25 1813     QT Interval 388 ms      QTC Interval 575 ms     Narrative:      HEART HXAB=527  bpm  RR Nxeaoxnf=139  ms  UT Interval=  ms  P Horizontal Axis=  deg  P Front Axis=  deg  QRSD Vfrninid=770  ms  QT Unwpabic=502  ms  BIaK=616  ms  QRS Axis=-71  deg  T Wave Axis=-16  deg  - ABNORMAL ECG -  Atrial fibrillation  Ventricular tachycardia, unsustained  Right bundle branch block  ST elevation secondary to IVCD  Date and Time of Study:2025-05-08 18:10:59    Telemetry Scan [[id number]] Resulted: 05/08/25     Updated: 05/09/25 0528    ECG 12 Lead Syncope [[id number]] Collected: 05/08/25 1822     Updated: 05/09/25 0717     QT Interval 380 ms      QTC Interval 461 ms     Narrative:      HEART RATE=88  bpm  RR Uxrabzhr=754  ms  UT Whvazqwi=973  ms  P Horizontal Axis=1  deg  P Front Axis=50  deg  QRSD Lierqabo=799  ms  QT Moqzcaxa=351  ms  NLxW=009  ms  QRS Axis=-109  deg  T Wave Axis=-10  deg  - ABNORMAL ECG -  Sinus rhythm  Right bundle branch block  When compared with ECG of 25-Mar-2025 18:34:32,  New conduction abnormality  Electronically Signed By: Claudy Willis ([facility]) 2025-05-09 07:17:04  Date and Time of Study:2025-05-08 18:22:09    Telemetry Scan [[id number]] Resulted: 05/08/25     Updated: 05/09/25 0824          reviewed    Results for orders placed during the hospital encounter of 03/25/25    Duplex Venous Lower Extremity - Left    Interpretation Summary    Normal left lower extremity venous duplex scan.    Left Baker's cyst.      Results for orders placed during the hospital encounter of 02/22/22    Adult Transthoracic Echo Complete W/ Cont if Necessary Per Protocol    Interpretation Summary  · Left ventricular wall  thickness is consistent with mild concentric hypertrophy.  · Calculated left ventricular EF = 60% Estimated left ventricular EF was in agreement with the calculated left ventricular EF.  · There is calcification of the aortic valve.    Transthoracic echocardiography is a technically difficult study.  EF is more than 60%.  Mild to moderate left atrial enlargement.  No effusion.      Electronically signed by Aba Frost MD, 02/24/22, 7:22 PM EST.      Microbiology Results (last 10 days)       ** No results found for the last 240 hours. **            Assessment & Plan     Dehydration          Dehydration   - CMP and CBC generally unremarkable  - UA showed trace of ketones but otherwise unremarkable  - EKG showed sinus rhythm with heart rate 88  - Positive for orthostatic hypotension in the ED  - IV gentle hydration     Recurrent falls  - Reports 3 falls within the past week   -PT recommends inpatient rehab  -Per CM can be discharged today to [facility]     Diabetes mellitus  - Poorly controlled   Lab Results   Component Value Date    GLUCOSE 181 (H) 05/09/2025    GLUCOSE 234 (H) 05/08/2025    GLUCOSE 133 (H) 03/27/2025    GLUCOSE 149 (H) 03/26/2025   -A1c 10  - Continue Lantus and mealtime insulin  -Diabetic diet  -Monitor before meals and at bedtime     Hyperlipidemia  - Continue Lipitor    Mood disorder  - Continue Cymbalta    I discussed the patients findings and my recommendations with patient and nursing staff.     Discharge Diagnosis:      Dehydration      Hospital Course  Patient is a 79 y.o. male presented with recurrent falls as noted in HPI above.  CMP, CBC, UA and EKG generally unremarkable.  Mild orthostatic hypotension in the ED so he was admitted to the observation unit for IV gentle hydration and PT evaluation.  PT recommended inpatient rehab and patient was discharged to [facility]. At this time, patient felt to be in good condition for discharge with close follow up with PCP. Instructed to take all  medications as prescribed and to return to ED if any concerning signs/symptoms. All test/lab results were discussed with patient. All questions were answered and patient verbalizes understanding.       Past Medical History:     Past Medical History:   Diagnosis Date    Diabetes mellitus 2001    Type 2    Diabetic peripheral neuropathy 2/22/2022    Essential hypertension 2/22/2022    Iron deficiency anemia 7/24/2020    Mild depression 2/22/2022    Peripheral vascular disease 2/23/2022    Restless leg syndrome 2/22/2022    Type 2 diabetes mellitus with hyperglycemia 2/22/2022       Past Surgical History:   History reviewed. No pertinent surgical history.    Social History:   Social History     Socioeconomic History    Marital status:    Tobacco Use    Smoking status: Never   Vaping Use    Vaping status: Never Used   Substance and Sexual Activity    Alcohol use: Not Currently    Drug use: Never    Sexual activity: Defer       Procedures Performed         Consults:   Consults       No orders found for last 30 day(s).            Condition on Discharge:     Stable    Discharge Disposition  Rehab Facility or Unit (DC - External)    Discharge Medications     Discharge Medications        Changes to Medications        Instructions Start Date   Toujeo Max SoloStar 300 UNIT/ML solution pen-injector injection  Generic drug: Insulin Glargine (2 Unit Dial)  What changed: how much to take   20 Units, Subcutaneous, Every Night at Bedtime             Continue These Medications        Instructions Start Date   Accu-Chek Guide test strip  Generic drug: glucose blood   1 each, Other, 3 Times Daily Before Meals, Dx: E11.65. Use as instructed      Accu-Chek Softclix Lancets lancets   1 each, Other, 3 Times Daily Before Meals, Dx: E11.65. Use as instructed      atorvastatin 20 MG tablet  Commonly known as: LIPITOR   20 mg, Daily      DULoxetine 60 MG capsule  Commonly known as: CYMBALTA   60 mg, Daily      Fiasp 100 UNIT/ML  solution  Generic drug: Insulin Aspart (w/Niacinamide)   30 Units, Every Morning Before Breakfast      Fiasp 100 UNIT/ML solution  Generic drug: Insulin Aspart (w/Niacinamide)   40 Units, Daily Before Supper      gabapentin 600 MG tablet  Commonly known as: NEURONTIN   600 mg, 3 Times Daily      insulin lispro 100 UNIT/ML injection  Commonly known as: HUMALOG/ADMELOG   7 Units, Subcutaneous, 3 Times Daily With Meals, Use sliding scale in addition to 7 units TID with meals as below, Blood Glucose 150-199 mg/dL - 2 units Blood Glucose 200-249 mg/dL - 4 units Blood Glucose 250-299 mg/dL - 6 units Blood Glucose 300-349 mg/dL - 7 units Blood Glucose 350-400 mg/dL - 8 units Blood Glucose greater than 400 mg/dL - 9 units      multivitamin with minerals tablet tablet   1 tablet, Daily      traMADol 50 MG tablet  Commonly known as: ULTRAM   50 mg, Oral, Every 6 Hours PRN               Discharge Diet:   Diet Instructions       Diet: Cardiac Diets, Diabetic Diets; Healthy Heart (2-3 Na+); Regular (IDDSI 7); Thin (IDDSI 0); Consistent Carbohydrate      Discharge Diet:  Cardiac Diets  Diabetic Diets       Cardiac Diet: Healthy Heart (2-3 Na+)    Texture: Regular (IDDSI 7)    Fluid Consistency: Thin (IDDSI 0)    Diabetic Diet: Consistent Carbohydrate            Activity at Discharge:     Follow-up Appointments  No future appointments.  Additional Instructions for the Follow-ups that You Need to Schedule       Discharge Follow-up with PCP   As directed       Currently Documented PCP:    Maddie uHang APRN    PCP Phone Number:    [phone]     Follow Up Details: 5-7 days                Test Results Pending at Discharge  Pending Results       None             Risk for Readmission (LACE) Score: 6 (5/9/2025  6:00 AM)      Greater than 30 minutes spent in discharge activities for this patient    Signature:Electronically signed by COLLIN Chávez, 05/09/25, 1:28 PM EDT.

## 2025-05-09 NOTE — CASE MANAGEMENT/SOCIAL WORK
[facility]  [facility]     Patient Name: Ignacio Miranda  MRN: [id number]  Today's Date: 5/9/2025    Admit Date: 5/8/2025    Plan: From home with spouse. [facility] Accept[facility] to [facility] with ready bed today; they will transport   Discharge Plan       Row Name 05/09/25 1459       Plan    Plan Comments [facility] has accepted and will pick him up in their van at 7:30 PM. CM informed Mr. Miranda who called his wife from hospital phone. CM attempted to call wife but vm is full.                                                   Expected Discharge Date and Time       Expected Discharge Date Expected Discharge Time    May 9, 2025               Cesilia KELLER,RN Case Manager  [facility]  Phone: Desk- [phone] cell- [phone]

## 2025-05-09 NOTE — NURSING NOTE
"Called [facility] to give report, Kristy who transferred me to the nurses station. I explained to the Nurse who picked up the phone I was attempting to call report for the patient who will be transferred to their facility. Nurse who answered the phone stated \"I don't even know what's going on, hang on.\" I was told the room the patient would be placed in room [facility] at [facility]. Gave report to Dominique.  "

## 2025-05-09 NOTE — ED NOTES
Nursing report ED to floor  Ignacio Miranda  79 y.o.  male    HPI :  HPI  Stated Reason for Visit: pt c/o dizziness when standing up. difficulty walking, L knee pain fell at home  History Obtained From: EMS    Chief Complaint  Chief Complaint   Patient presents with    Dizziness       Admitting doctor:   Claudy Willis MD    Admitting diagnosis:   The primary encounter diagnosis was General weakness. Diagnoses of Dehydration and Contusion of left knee, initial encounter were also pertinent to this visit.    Code status:   Current Code Status       Date Active Code Status Order ID Comments User Context       5/8/2025 2102 CPR (Attempt to Resuscitate) [id number]  Claudy Willis MD ED        Question Answer    Code Status (Patient has no pulse and is not breathing) CPR (Attempt to Resuscitate)    Medical Interventions (Patient has pulse or is breathing) Full Support                    Allergies:   Patient has no known allergies.    Isolation:   No active isolations    Intake and Output  No intake or output data in the 24 hours ending 05/08/25 2116    Weight:       05/08/25 1758   Weight: 90.7 kg (200 lb)       Most recent vitals:   Vitals:    05/08/25 2101 05/08/25 2104 05/08/25 2114 05/08/25 2116   BP: 145/79   138/77   Patient Position:       Pulse:  91 92    Resp:       Temp:       SpO2:  95% 97%    Weight:       Height:           Active LDAs/IV Access:   Lines, Drains & Airways       Active LDAs       Name Placement date Placement time Site Days    Peripheral IV 03/25/25 1806 Left Antecubital 03/25/25  1806  Antecubital  44                    Labs (abnormal labs have a star):   Labs Reviewed   COMPREHENSIVE METABOLIC PANEL - Abnormal; Notable for the following components:       Result Value    Glucose 234 (*)     CO2 31.0 (*)     Alkaline Phosphatase 120 (*)     All other components within normal limits    Narrative:     GFR Categories in Chronic Kidney Disease (CKD)              GFR Category          GFR  (mL/min/1.73)    Interpretation  G1                    90 or greater        Normal or high (1)  G2                    60-89                Mild decrease (1)  G3a                   45-59                Mild to moderate decrease  G3b                   30-44                Moderate to severe decrease  G4                    15-29                Severe decrease  G5                    14 or less           Kidney failure    (1)In the absence of evidence of kidney disease, neither GFR category G1 or G2 fulfill the criteria for CKD.    eGFR calculation 2021 CKD-EPI creatinine equation, which does not include race as a factor   URINALYSIS W/ CULTURE IF INDICATED - Abnormal; Notable for the following components:    Glucose,  mg/dL (Trace) (*)     Ketones, UA Trace (*)     All other components within normal limits    Narrative:     In absence of clinical symptoms, the presence of pyuria, bacteria, and/or nitrites on the urinalysis result does not correlate with infection.  Urine microscopic not indicated.   CBC WITH AUTO DIFFERENTIAL - Abnormal; Notable for the following components:    WBC 12.34 (*)     MCHC 30.8 (*)     Neutrophil % 77.8 (*)     Lymphocyte % 13.1 (*)     Neutrophils, Absolute 9.60 (*)     All other components within normal limits   MAGNESIUM - Normal   TSH RFX ON ABNORMAL TO FREE T4 - Normal   RAINBOW DRAW    Narrative:     The following orders were created for panel order [place] Draw.  Procedure                               Abnormality         Status                     ---------                               -----------         ------                     Green Top (Gel)[[id number]]                                  Final result               Lavender Top[[id number]]                                     Final result               Gold Top - SST[[id number]]                                   Final result               Light Blue Top[[id number]]                                   Final result                  Please view results for these tests on the individual orders.   GREEN TOP   LAVENDER TOP   GOLD TOP - SST   LIGHT BLUE TOP   CBC AND DIFFERENTIAL    Narrative:     The following orders were created for panel order CBC & Differential.  Procedure                               Abnormality         Status                     ---------                               -----------         ------                     CBC Auto Differential[[id number]]        Abnormal            Final result                 Please view results for these tests on the individual orders.       EKG:   ECG 12 Lead Syncope   Preliminary Result   HEART RATE=88  bpm   RR Qsbuvqzv=575  ms   GA Ooljtnnq=031  ms   P Horizontal Axis=1  deg   P Front Axis=50  deg   QRSD Lzjmthjz=466  ms   QT Lnhllzcl=815  ms   UFjV=948  ms   QRS Axis=-109  deg   T Wave Axis=-10  deg   - ABNORMAL ECG -   Sinus rhythm   Right bundle branch block   Date and Time of Study:2025-05-08 18:22:09      ECG 12 Lead Other; dizziness   Preliminary Result   HEART LBIA=934  bpm   RR Szvhiowy=582  ms   GA Interval=  ms   P Horizontal Axis=  deg   P Front Axis=  deg   QRSD Vfaunyhy=809  ms   QT Jxbdamds=173  ms   NJzK=458  ms   QRS Axis=-71  deg   T Wave Axis=-16  deg   - ABNORMAL ECG -   Atrial fibrillation   Ventricular tachycardia, unsustained   Right bundle branch block   ST elevation secondary to IVCD   Date and Time of Study:2025-05-08 18:10:59      ECG 12 Lead Altered Mental Status    (Results Pending)       Meds given in ED:   Medications   sodium chloride 0.9 % flush 10 mL (has no administration in time range)   sodium chloride 0.9 % bolus 500 mL (500 mL Intravenous New Bag 5/8/25 1947)       Imaging results:  XR Knee 3 View Left  Result Date: 5/8/2025  Impression: 1.No radiographic evidence of acute fracture or dislocation. 2.Advanced tricompartmental osteoarthrosis. Electronically Signed: Rony Van MD  5/8/2025 7:31 PM EDT  Workstation ID: [id number]    CT Head Without  Contrast  Result Date: 5/8/2025  Impression: No acute intracranial abnormality on head CT. Brain MRI is more sensitive to evaluate for acute or subacute infarct. Electronically Signed: Sandy Alcantar MD  5/8/2025 6:53 PM EDT  Workstation ID: [id number]      Ambulatory status:   - assist x 2    Social issues:   Social History     Socioeconomic History    Marital status:    Tobacco Use    Smoking status: Never   Vaping Use    Vaping status: Never Used   Substance and Sexual Activity    Alcohol use: Not Currently    Drug use: Never    Sexual activity: Defer       Peripheral Neurovascular  Peripheral Neurovascular (Adult)  Peripheral Neurovascular WDL: WDL    Neuro Cognitive  Neuro Cognitive (Adult)  Cognitive/Neuro/Behavioral WDL: WDL    Learning  Learning Assessment  Learning Readiness and Ability: no barriers identified    Respiratory  Respiratory WDL  Respiratory WDL: WDL    Abdominal Pain       Pain Assessments  Pain (Adult)  (0-10) Pain Rating: Rest: 5  (0-10) Pain Rating: Activity: 5  Pain Location: knee    NIH Stroke Scale       Aubree Allison RN  05/08/25 21:17 EDT

## 2025-05-09 NOTE — CASE MANAGEMENT/SOCIAL WORK
Discharge Planning Assessment   [facility]     Patient Name: Ignacio Miranda  MRN: [id number]  Today's Date: 5/9/2025    Admit Date: 5/8/2025    Plan: From home with spouse. [facility]. [facility] with ready bed today; they will transport   Discharge Needs Assessment       Row Name 05/09/25 1350       Living Environment    People in Home child(kath), adult;spouse    Name(s) of People in Home Cesilia, spouse and disabled son    Current Living Arrangements home    Potentially Unsafe Housing Conditions none    In the past 12 months has the electric, gas, oil, or water company threatened to shut off services in your home? No    Primary Care Provided by spouse/significant other    Provides Primary Care For no one, unable/limited ability to care for self    Family Caregiver if Needed spouse    Family Caregiver Names Cesilia, spouse    Quality of Family Relationships helpful;involved;supportive    Able to Return to Prior Arrangements yes       Resource/Environmental Concerns    Resource/Environmental Concerns none;home accessibility    Home Accessibility Concerns not wheelchair accessible;bathroom not accessible    Transportation Concerns none       Transportation Needs    In the past 12 months, has lack of transportation kept you from medical appointments or from getting medications? no    In the past 12 months, has lack of transportation kept you from meetings, work, or from getting things needed for daily living? No       Food Insecurity    Within the past 12 months, the food you bought just didn't last and you didn't have money to get more. Never true       Transition Planning    Patient/Family Anticipates Transition to inpatient rehabilitation facility    Patient/Family Anticipated Services at Transition rehabilitation services    Transportation Anticipated family or friend will provide       Discharge Needs Assessment    Readmission Within the Last 30 Days no previous admission in last 30 days     Equipment Currently Used at Home commode;shower chair;wheelchair;walker, standard    Concerns to be Addressed discharge planning    Do you want help finding or keeping work or a job? I do not need or want help    Do you want help with school or training? For example, starting or completing job training or getting a high school diploma, GED or equivalent No    Anticipated Changes Related to Illness none    Equipment Needed After Discharge none    Outpatient/Agency/Support Group Needs inpatient rehabilitation facility    Patient's Choice of Community Agency(s) [facility]                   Discharge Plan       Row Name 05/09/25 1351       Plan    Plan From home with spouse. [facility]. [facility] with [facility] bed today; they will transport    Patient/Family in Agreement with Plan yes    Plan Comments CM met with Mr. Miranda who is a/o and reported he lives with his wife who is his caregiver and an adult disabled son. He does not drive and has rolling walker, wheelchair and bsc at home. They rent and he cannot access his  bathroom. PCP and Pharmacy confirmed. He denied financial concerns. CM discussed inpatient rehab and he has been at [facility] in the past. CM made referral to [facility] and they met him and called his wife and accepted him to [facility]. Discharge orders placed and [facility] will let CM know transport time.                  Continued Care and Services - Admitted Since 5/8/2025       Destination Coordination complete.      Service Provider Request Status Services Address Phone Fax Patient Preferred    [facility]  Selected Inpatient Rehabilitation [address] [place] [address] [place] [address] [phone] --    [facility] INPT Accepted -- [address] [facility] [place] [address] [phone] --                           Demographic Summary       Row Name 05/09/25 1345       General Information    Admission Type  observation    Arrived From emergency department    Required Notices Provided Observation Status Notice    Referral Source admission list    Reason for Consult discharge planning    Preferred Language English       Contact Information    Permission Granted to Share Info With                    Functional Status       Row Name 05/09/25 9122       Functional Status    Usual Activity Tolerance moderate    Current Activity Tolerance moderate       Functional Status, IADL    Medications assistive person    Meal Preparation completely dependent    Housekeeping completely dependent    Laundry completely dependent    Shopping completely dependent    If for any reason you need help with day-to-day activities such as bathing, preparing meals, shopping, managing finances, etc., do you get the help you need? I need a lot more help    IADL Comments wife assists       Employment/    Employment Status retired                    Cesilia KELLER,RN Case Manager  [facility]  Phone: desk- [phone] cell- [phone]

## 2025-05-09 NOTE — DISCHARGE PLACEMENT REQUEST
"Joon Covarrubias (79 y.o. Male)       Date of Birth   1945    Social Security Number       Address   [address] [place] [facility]    Home Phone   [phone]    MRN   [id number]       [facility]    Marital Status                               Admission Date   5/8/2025    Admission Type   Emergency    Admitting Provider   Claudy Willis MD    Attending Provider   Claudy Willis MD    Department, Room/Bed   [facility] OBSERVATION, 228/1       Discharge Date       Discharge Disposition       Discharge Destination                                 Attending Provider: Claudy Willis MD    Allergies: No Known Allergies    Isolation: None   Infection: None   Code Status: CPR    Ht: 175.3 cm (69.02\")   Wt: 90.7 kg (199 lb 15.3 oz)    Admission Cmt: None   Principal Problem: Dehydration [E86.0]                   Active Insurance as of 5/8/2025       Primary Coverage       Payor Plan Insurance Group Employer/Plan Group    MEDICARE RAILROAD MEDICARE        Payor Plan Address Payor Plan Phone Number Payor Plan Fax Number Effective Dates    [address] [phone]  1/1/2001 - None Entered    [place] [id number]         Subscriber Name Subscriber Birth Date Member ID       JOON COVARRUBIAS 1945 [id number]               Secondary Coverage       Payor Plan Insurance Group Employer/Plan Group    [facility] [id number]       Payor Plan Address Payor Plan Phone Number Payor Plan Fax Number Effective Dates    [address] [address] [phone]  1/1/2022 - None Entered    [facility] [address]         Subscriber Name Subscriber Birth Date Member ID       JOON COVARRUBIAS 1945 [id number]                     Emergency Contacts        (Rel.) Home Phone Work Phone Mobile Phone    RODRIGO PENA (Daughter) -- -- [phone]    AGUILAR COVARRUBIAS (Spouse) [phone] --    KEYSHAWNNANCY (Son) [phone] -- --      "         Emergency Contact Information       Name Relation Home Work Mobile    RODRIGO PENA Daughter   [phone]    AGUILAR COVARRUBIAS Spouse [phone]     NANCY MAHAJAN Son [phone]            Other Contacts    None on File

## 2025-05-09 NOTE — PLAN OF CARE
Goal Outcome Evaluation:  Plan of Care Reviewed With: patient   Pt presents as a 69 y/o M states he fell on 5/8/25 when he was transitioning from his walker to his chair.  States he had to be assisted back up by the fire department.  CT head (-). X-ray L knee: 1.No radiographic evidence of acute fracture or dislocation.2.Advanced tricompartmental osteoarthrosis.Pt reports having 3 falls in past 2 weeks. Pt is being treated for dehydration. Pt A and O x 4. Pt has neuropathy B feet. Pt reports injection to left knee in past 2 weeks. Pt resides with spouse and son in [facility] with 3 PINA.Pt typically uses a rolling walker independently for short distances within home and he uses a w/c for community outings. This date, pt transfers to edge of bed with verbal cues for task segmentation with CGA. Transferred bed to recliner with rolling walker with mod assist of 1 for ~ 5 feet. Pt required 100% cueing for walker placement and step sequencing. Patient is a high risk of injurious falls and unsafe to return to prior living environment at this time.  Pt is far below his baseline for mobility. PT recommendation is In-patient Rehabilitation Facility.              Anticipated Discharge Disposition (PT): inpatient rehabilitation facility

## 2025-05-09 NOTE — THERAPY EVALUATION
Patient Name: Ignacio Miranda  : 1945    MRN: [id number]                              Today's Date: 2025       Admit Date: 2025    Visit Dx:     ICD-10-CM ICD-9-CM   1. General weakness  R53.1 780.79   2. Dehydration  E86.0 276.51   3. Contusion of left knee, initial encounter  S80.02XA 924.11     Patient Active Problem List   Diagnosis    Iron deficiency anemia    Normocytic anemia    Essential hypertension    Type 2 diabetes mellitus with hyperglycemia    Diabetic peripheral neuropathy    Mild depression    Restless leg syndrome    Peripheral vascular disease    Back pain    Near syncope    Dehydration     Past Medical History:   Diagnosis Date    Diabetes mellitus     Type 2    Diabetic peripheral neuropathy 2022    Essential hypertension 2022    Iron deficiency anemia 2020    Mild depression 2022    Peripheral vascular disease 2022    Restless leg syndrome 2022    Type 2 diabetes mellitus with hyperglycemia 2022     History reviewed. No pertinent surgical history.   General Information       Row Name 25 1313          Physical Therapy Time and Intention    Document Type evaluation  -BR     Mode of Treatment physical therapy  -BR       Row Name 25 1313          General Information    Patient Profile Reviewed yes  -BR     Prior Level of Function independent:;all household mobility;gait;transfer  Pt uses a rolling walker for short distances within household at baseline. Pt uses a w/c for community outings.  -BR     Existing Precautions/Restrictions fall  -BR     Barriers to Rehab previous functional deficit  -BR       Row Name 25 1313          Living Environment    Current Living Arrangements home  -BR     People in Home child(kath), adult;spouse  -BR       Row Name 25 1313          Home Main Entrance    Number of Stairs, Main Entrance three  -BR       Row Name 25 1313          Stairs Within Home, Primary    Number of Stairs, Within  Home, Primary none  -BR       Row Name 05/09/25 1313          Cognition    Orientation Status (Cognition) oriented x 4  -BR       Row Name 05/09/25 1313          Safety Issues/Impairments Affecting Functional Mobility    Impairments Affecting Function (Mobility) balance;endurance/activity tolerance;strength;pain;postural/trunk control  -BR               User Key  (r) = Recorded By, (t) = Taken By, (c) = Cosigned By      Initials Name Provider Type    BR Vanessa Vázquez PT Physical Therapist                   Mobility       Row Name 05/09/25 1314          Bed Mobility    Bed Mobility supine-sit  -BR     Supine-Sit [place] (Bed Mobility) contact guard  -BR     Assistive Device (Bed Mobility) head of bed elevated  -BR     Comment, (Bed Mobility) Pt required additional time and cues for weight shifting to get scooted to edge of bed.  -BR       Row Name 05/09/25 1314          Bed-Chair Transfer    Bed-Chair [place] (Transfers) moderate assist (50% patient effort);1 person assist;verbal cues  -BR       Row Name 05/09/25 1314          Sit-Stand Transfer    Sit-Stand [place] (Transfers) minimum assist (75% patient effort);1 person to manage equipment  -BR     Assistive Device (Sit-Stand Transfers) walker, front-wheeled  -BR       Row Name 05/09/25 1314          Gait/Stairs (Locomotion)    [place] Level (Gait) moderate assist (50% patient effort);1 person assist;verbal cues  -BR     Assistive Device (Gait) walker, front-wheeled  -BR     Patient was able to Ambulate yes  -BR     Distance in Feet (Gait) 5  -BR     Deviations/Abnormal Patterns (Gait) weight shifting decreased;angelica decreased  -BR     Bilateral Gait Deviations forward flexed posture;heel strike decreased  -BR               User Key  (r) = Recorded By, (t) = Taken By, (c) = Cosigned By      Initials Name Provider Type    Vanessa Graves PT Physical Therapist                   Obj/Interventions       Row Name 05/09/25 1317           Range of Motion Comprehensive    Comment, General Range of Motion ~25% limitation grossly for ROM BLE  -BR       Row Name 05/09/25 1317          Strength Comprehensive (MMT)    Comment, General Manual Muscle Testing (MMT) Assessment grossly 3/5 BLE  -BR       Row Name 05/09/25 1317          Balance    Balance Assessment sitting static balance;sitting dynamic balance;standing static balance  -BR     Static Sitting Balance supervision  -BR     Dynamic Sitting Balance contact guard  -BR     Position, Sitting Balance unsupported;sitting edge of bed  -BR     Static Standing Balance minimal assist  -BR     Position/Device Used, Standing Balance walker, rolling;supported  -BR       Row Name 05/09/25 1317          Sensory Assessment (Somatosensory)    Sensory Assessment (Somatosensory) other (see comments)  Pt has neuropathy B feet  -BR               User Key  (r) = Recorded By, (t) = Taken By, (c) = Cosigned By      Initials Name Provider Type    Vanessa Graves, PT Physical Therapist                   Goals/Plan       Row Name 05/09/25 1326          Bed Mobility Goal 1 (PT)    Activity/Assistive Device (Bed Mobility Goal 1, PT) bed mobility activities, all  -BR     [place] Level/Cues Needed (Bed Mobility Goal 1, PT) modified independence  -BR     Time Frame (Bed Mobility Goal 1, PT) long term goal (LTG);2 weeks  -BR       Row Name 05/09/25 1326          Transfer Goal 1 (PT)    Activity/Assistive Device (Transfer Goal 1, PT) transfers, all  -BR     [place] Level/Cues Needed (Transfer Goal 1, PT) modified independence  -BR     Time Frame (Transfer Goal 1, PT) long term goal (LTG);2 weeks  -BR       Row Name 05/09/25 1326          Gait Training Goal 1 (PT)    Activity/Assistive Device (Gait Training Goal 1, PT) gait (walking locomotion);assistive device use  -BR     [place] Level (Gait Training Goal 1, PT) standby assist  -BR     Distance (Gait Training Goal 1, PT) 35  -BR     Time Frame (Gait Training  Goal 1, PT) long term goal (LTG);2 weeks  -BR       Row Name 05/09/25 1326          Therapy Assessment/Plan (PT)    Planned Therapy Interventions (PT) balance training;bed mobility training;gait training;patient/family education;transfer training;ROM (range of motion);strengthening  -BR               User Key  (r) = Recorded By, (t) = Taken By, (c) = Cosigned By      Initials Name Provider Type    BR Vanessa Vázquez, PT Physical Therapist                   Clinical Impression       Row Name 05/09/25 1318          Pain    Pretreatment Pain Rating 0/10 - no pain  -BR     Posttreatment Pain Rating 0/10 - no pain  -BR       Row Name 05/09/25 1325 05/09/25 1318       Plan of Care Review    Plan of Care Reviewed With -- patient  -BR    Outcome Evaluation Pt presents as a 71 y/o M states he fell on 5/8/25 when he was transitioning from his walker to his chair.  States he had to be assisted back up by the fire department.  CT head (-). X-ray L knee: 1.No radiographic evidence of acute fracture or dislocation.2.Advanced tricompartmental osteoarthrosis.Pt reports having 3 falls in past 2 weeks. Pt is being treated for dehydration. Pt A and O x 4. Pt has neuropathy B feet. Pt reports injection to left knee in past 2 weeks. Pt resides with spouse and son in [facility] with 3 PINA.Pt typically uses a rolling walker independently for short distances within home and he uses a w/c for community outings. This date, pt transfers to edge of bed with verbal cues for task segmentation with CGA. Transferred bed to recliner with rolling walker with mod assist of 1 for ~ 5 feet. Pt required 100% cueing for walker placement and step sequencing. Patient is a high risk of injurious falls and unsafe to return to prior living environment at this time.  Pt is far below his baseline for mobility. PT recommendation is In-patient Rehabilitation Facility.  -BR --      Row Name 05/09/25 1318          Therapy Assessment/Plan (PT)    Rehab Potential (PT)  good  -BR     Criteria for Skilled Interventions Met (PT) yes;meets criteria;skilled treatment is necessary  -BR     Therapy Frequency (PT) 5 times/wk  -BR     Predicted Duration of Therapy Intervention (PT) until D/C  -BR       Row Name 05/09/25 1318          Vital Signs    Pre Systolic BP Rehab 115  -BR     Pre Treatment Diastolic BP 74  -BR     Pretreatment Heart Rate (beats/min) 96  -BR     Intratreatment Heart Rate (beats/min) 103  -BR     Posttreatment Heart Rate (beats/min) 100  -BR     Pre SpO2 (%) 97  -BR     O2 Delivery Pre Treatment room air  -BR     Pre Patient Position Supine  -BR     Intra Patient Position Standing  -BR     Post Patient Position Sitting  -BR       Row Name 05/09/25 1318          Positioning and Restraints    Pre-Treatment Position in bed  -BR     Post Treatment Position chair  -BR     In Chair notified nsg;reclined;call light within reach;encouraged to call for assist;exit alarm on;legs elevated  -BR               User Key  (r) = Recorded By, (t) = Taken By, (c) = Cosigned By      Initials Name Provider Type    BR Vanessa Vázquez, PT Physical Therapist                   Outcome Measures       Row Name 05/09/25 1328 05/09/25 0853       How much help from another person do you currently need...    Turning from your back to your side while in flat bed without using bedrails? 3  -BR 4  -LH    Moving from lying on back to sitting on the side of a flat bed without bedrails? 3  -BR 3  -LH    Moving to and from a bed to a chair (including a wheelchair)? 2  -BR 2  -LH    Standing up from a chair using your arms (e.g., wheelchair, bedside chair)? 2  -BR 2  -LH    Climbing 3-5 steps with a railing? 1  -BR 2  -LH    To walk in hospital room? 2  -BR 2  -LH    AM-PAC 6 Clicks Score (PT) 13  -BR 15  -LH    Highest Level of Mobility Goal 4 --> Transfer to chair/commode  -BR 4 --> Transfer to chair/commode  -LH      Row Name 05/09/25 1328          Functional Assessment    Outcome Measure Options  AM-PAC 6 Clicks Basic Mobility (PT)  -BR               User Key  (r) = Recorded By, (t) = Taken By, (c) = Cosigned By      Initials Name Provider Type    BR Vanessa Vázquez PT Physical Therapist    Cristi Horton, RN Registered Nurse                                 Physical Therapy Education       Title: PT OT SLP Therapies (Done)       Topic: Physical Therapy (Done)       Point: Mobility training (Done)       Learning Progress Summary            Patient Acceptance, E,D, VU,DU by BR at 5/9/2025 1330                      Point: Body mechanics (Done)       Learning Progress Summary            Patient Acceptance, E,D, VU,DU by BR at 5/9/2025 1330                      Point: Precautions (Done)       Learning Progress Summary            Patient Acceptance, E,D, VU,DU by BR at 5/9/2025 1330                                      User Key       Initials Effective Dates Name Provider Type Discipline     02/01/22 -  Vanessa Vázquez PT Physical Therapist PT                  PT Recommendation and Plan  Planned Therapy Interventions (PT): balance training, bed mobility training, gait training, patient/family education, transfer training, ROM (range of motion), strengthening  Outcome Evaluation: Pt presents as a 71 y/o M states he fell on 5/8/25 when he was transitioning from his walker to his chair.  States he had to be assisted back up by the fire department.  CT head (-). X-ray L knee: 1.No radiographic evidence of acute fracture or dislocation.2.Advanced tricompartmental osteoarthrosis.Pt reports having 3 falls in past 2 weeks. Pt is being treated for dehydration. Pt A and O x 4. Pt has neuropathy B feet. Pt reports injection to left knee in past 2 weeks. Pt resides with spouse and son in [facility] with 3 PINA.Pt typically uses a rolling walker independently for short distances within home and he uses a w/c for community outings. This date, pt transfers to edge of bed with verbal cues for task segmentation with CGA.  Transferred bed to recliner with rolling walker with mod assist of 1 for ~ 5 feet. Pt required 100% cueing for walker placement and step sequencing. Patient is a high risk of injurious falls and unsafe to return to prior living environment at this time.  Pt is far below his baseline for mobility. PT recommendation is In-patient Rehabilitation Facility.     Time Calculation:         PT Charges       Row Name 05/09/25 1331             Time Calculation    Start Time 1240  -BR      Stop Time 1310  -BR      Time Calculation (min) 30 min  -BR      PT Received On 05/09/25  -BR      PT - Next Appointment 05/10/25  -BR      PT Goal Re-Cert Due Date 05/23/25  -BR         Time Calculation- PT    Total Timed Code Minutes- PT 0 minute(s)  -BR                User Key  (r) = Recorded By, (t) = Taken By, (c) = Cosigned By      Initials Name Provider Type    BR Vanessa Vázquez, JESSY Physical Therapist                  Therapy Charges for Today       Code Description Service Date Service Provider Modifiers Qty    40646779174 HC PT EVAL MOD COMPLEXITY 4 5/9/2025 Vanessa Vázquez, PT GP 1            PT G-Codes  Outcome Measure Options: AM-PAC 6 Clicks Basic Mobility (PT)  AM-PAC 6 Clicks Score (PT): 13  PT Discharge Summary  Anticipated Discharge Disposition (PT): inpatient rehabilitation facility    Vanessa Vázquez PT  5/9/2025

## 2025-05-09 NOTE — PLAN OF CARE
Goal Outcome Evaluation:      Patient had physical therapy work with him today. Patient qualified for [facility], which accepted the patient. Patient has been medically cleared for transition of care to [facility].

## 2025-05-13 NOTE — CASE MANAGEMENT/SOCIAL WORK
Case Management Discharge Note      Final Note: [facility]         Selected Continued Care - Discharged on 5/9/2025 Admission date: 5/8/2025 - Discharge disposition: Rehab Facility or Unit (DC - External)      Destination Coordination complete.      Service Provider Services Address Phone Fax Patient Preferred    [facility] [address] [place] [address] [place] [address] [phone] --                        Transportation Services  W/C Van: Other ([facility])    Final Discharge Disposition Code: 62 - inpatient rehab facility

## 2025-08-15 ENCOUNTER — APPOINTMENT (OUTPATIENT)
Dept: GENERAL RADIOLOGY | Facility: HOSPITAL | Age: 80
End: 2025-08-15
Payer: MEDICARE

## 2025-08-15 ENCOUNTER — APPOINTMENT (OUTPATIENT)
Dept: CARDIOLOGY | Facility: HOSPITAL | Age: 80
End: 2025-08-15
Payer: MEDICARE

## 2025-08-15 ENCOUNTER — APPOINTMENT (OUTPATIENT)
Dept: CT IMAGING | Facility: HOSPITAL | Age: 80
End: 2025-08-15
Payer: MEDICARE

## 2025-08-15 ENCOUNTER — HOSPITAL ENCOUNTER (INPATIENT)
Facility: HOSPITAL | Age: 80
LOS: 4 days | Discharge: HOME | End: 2025-08-21
Attending: EMERGENCY MEDICINE | Admitting: STUDENT IN AN ORGANIZED HEALTH CARE EDUCATION/TRAINING PROGRAM
Payer: MEDICARE

## 2025-08-21 ENCOUNTER — APPOINTMENT (OUTPATIENT)
Dept: RESPIRATORY THERAPY | Facility: HOSPITAL | Age: 80
End: 2025-08-21
Payer: MEDICARE

## 2025-08-21 ENCOUNTER — READMISSION MANAGEMENT (OUTPATIENT)
Dept: CALL CENTER | Facility: HOSPITAL | Age: 80
End: 2025-08-21
Payer: MEDICARE

## 2025-08-25 ENCOUNTER — READMISSION MANAGEMENT (OUTPATIENT)
Dept: CALL CENTER | Facility: HOSPITAL | Age: 80
End: 2025-08-25
Payer: MEDICARE